# Patient Record
Sex: FEMALE | Race: WHITE | NOT HISPANIC OR LATINO | Employment: OTHER | URBAN - METROPOLITAN AREA
[De-identification: names, ages, dates, MRNs, and addresses within clinical notes are randomized per-mention and may not be internally consistent; named-entity substitution may affect disease eponyms.]

---

## 2017-03-02 ENCOUNTER — CONVERSION ENCOUNTER (OUTPATIENT)
Dept: MAMMOGRAPHY | Facility: HOSPITAL | Age: 72
End: 2017-03-02

## 2017-09-07 ENCOUNTER — CONVERSION ENCOUNTER (OUTPATIENT)
Dept: MAMMOGRAPHY | Facility: HOSPITAL | Age: 72
End: 2017-09-07

## 2018-03-22 ENCOUNTER — CONVERSION ENCOUNTER (OUTPATIENT)
Dept: MAMMOGRAPHY | Facility: HOSPITAL | Age: 73
End: 2018-03-22

## 2018-08-06 ENCOUNTER — HOSPITAL ENCOUNTER (OUTPATIENT)
Dept: PREOP | Facility: HOSPITAL | Age: 73
Setting detail: HOSPITAL OUTPATIENT SURGERY
Discharge: HOME | End: 2018-08-06
Attending: THORACIC SURGERY (CARDIOTHORACIC VASCULAR SURGERY) | Admitting: THORACIC SURGERY (CARDIOTHORACIC VASCULAR SURGERY)

## 2018-08-06 LAB — GLUCOSE BLD-MCNC: 154 MG/DL (ref 70–105)

## 2019-01-28 ENCOUNTER — HOSPITAL ENCOUNTER (OUTPATIENT)
Dept: CT IMAGING | Facility: HOSPITAL | Age: 74
Discharge: HOME | End: 2019-01-28

## 2019-02-06 ENCOUNTER — HOSPITAL ENCOUNTER (OUTPATIENT)
Dept: GASTROENTEROLOGY | Facility: HOSPITAL | Age: 74
Setting detail: HOSPITAL OUTPATIENT SURGERY
Discharge: HOME | End: 2019-02-06
Attending: THORACIC SURGERY (CARDIOTHORACIC VASCULAR SURGERY) | Admitting: THORACIC SURGERY (CARDIOTHORACIC VASCULAR SURGERY)

## 2019-02-06 LAB — GLUCOSE BLD-MCNC: 100 MG/DL (ref 70–105)

## 2019-02-20 ENCOUNTER — HOSPITAL ENCOUNTER (OUTPATIENT)
Dept: OTHER | Facility: HOSPITAL | Age: 74
Discharge: HOME | End: 2019-02-20

## 2019-02-20 LAB
ALBUMIN SERPL-MCNC: 4.2 G/DL (ref 3.5–5)
ALBUMIN/GLOB SERPL: 1.5 {RATIO} (ref 1.4–2.6)
ALP SERPL-CCNC: 74 U/L (ref 43–160)
ALT SERPL-CCNC: 15 U/L (ref 10–40)
ANION GAP SERPL CALC-SCNC: 16 MMOL/L (ref 8–19)
APTT BLD: 22.1 S (ref 22.2–34.2)
AST SERPL-CCNC: 19 U/L (ref 15–50)
BASOPHILS # BLD AUTO: 0.04 10*3/UL (ref 0–0.2)
BASOPHILS NFR BLD AUTO: 0.5 % (ref 0–3)
BILIRUB SERPL-MCNC: 0.44 MG/DL (ref 0.2–1.3)
BUN SERPL-MCNC: 17 MG/DL (ref 5–25)
BUN/CREAT SERPL: 18 {RATIO} (ref 6–20)
CALCIUM SERPL-MCNC: 9.9 MG/DL (ref 8.7–10.4)
CHLORIDE SERPL-SCNC: 101 MMOL/L (ref 99–111)
CONV ABS IMM GRAN: 0.06 10*3/UL (ref 0–0.2)
CONV CO2: 27 MMOL/L (ref 22–32)
CONV IMMATURE GRAN: 0.8 % (ref 0–1.8)
CONV TOTAL PROTEIN: 7 G/DL (ref 6.3–8.2)
CREAT UR-MCNC: 0.94 MG/DL (ref 0.5–0.9)
DEPRECATED RDW RBC AUTO: 44.5 FL (ref 36.4–46.3)
EOSINOPHIL # BLD AUTO: 0.28 10*3/UL (ref 0–0.7)
EOSINOPHIL # BLD AUTO: 3.5 % (ref 0–7)
ERYTHROCYTE [DISTWIDTH] IN BLOOD BY AUTOMATED COUNT: 12.8 % (ref 11.7–14.4)
GFR SERPLBLD BASED ON 1.73 SQ M-ARVRAT: 60 ML/MIN/{1.73_M2}
GLOBULIN UR ELPH-MCNC: 2.8 G/DL (ref 2–3.5)
GLUCOSE SERPL-MCNC: 244 MG/DL (ref 65–99)
HBA1C MFR BLD: 10.8 G/DL (ref 12–16)
HCT VFR BLD AUTO: 33.2 % (ref 37–47)
INR PPP: 0.92 (ref 2–3)
LYMPHOCYTES # BLD AUTO: 1.87 10*3/UL (ref 1–5)
MCH RBC QN AUTO: 30.7 PG (ref 27–31)
MCHC RBC AUTO-ENTMCNC: 32.5 G/DL (ref 33–37)
MCV RBC AUTO: 94.3 FL (ref 81–99)
MONOCYTES # BLD AUTO: 1.11 10*3/UL (ref 0.2–1.2)
MONOCYTES NFR BLD AUTO: 13.9 % (ref 3–10)
NEUTROPHILS # BLD AUTO: 4.63 10*3/UL (ref 2–8)
NEUTROPHILS NFR BLD AUTO: 57.9 % (ref 30–85)
NRBC CBCN: 0 % (ref 0–0.7)
OSMOLALITY SERPL CALC.SUM OF ELEC: 300 MOSM/KG (ref 273–304)
PLATELET # BLD AUTO: 327 10*3/UL (ref 130–400)
PMV BLD AUTO: 9.4 FL (ref 9.4–12.3)
POTASSIUM SERPL-SCNC: 4.1 MMOL/L (ref 3.5–5.3)
PROTHROMBIN TIME: 9.8 S (ref 9.4–12)
RBC # BLD AUTO: 3.52 10*6/UL (ref 4.2–5.4)
SODIUM SERPL-SCNC: 140 MMOL/L (ref 135–147)
VARIANT LYMPHS NFR BLD MANUAL: 23.4 % (ref 20–45)
WBC # BLD AUTO: 7.99 10*3/UL (ref 4.8–10.8)

## 2019-03-19 ENCOUNTER — HOSPITAL ENCOUNTER (OUTPATIENT)
Dept: OTHER | Facility: HOSPITAL | Age: 74
Discharge: HOME | End: 2019-03-19

## 2019-03-19 LAB
25(OH)D3 SERPL-MCNC: 18.6 NG/ML (ref 30–100)
ALBUMIN SERPL-MCNC: 2.6 G/DL (ref 3.5–5)
ALBUMIN/GLOB SERPL: 0.8 {RATIO} (ref 1.4–2.6)
ALP SERPL-CCNC: 73 U/L (ref 43–160)
ALT SERPL-CCNC: 11 U/L (ref 10–40)
ANION GAP SERPL CALC-SCNC: 19 MMOL/L (ref 8–19)
APPEARANCE UR: ABNORMAL
AST SERPL-CCNC: 14 U/L (ref 15–50)
BASOPHILS # BLD AUTO: 0.09 10*3/UL (ref 0–0.2)
BASOPHILS NFR BLD AUTO: 0.7 % (ref 0–3)
BILIRUB SERPL-MCNC: 0.25 MG/DL (ref 0.2–1.3)
BILIRUB UR QL: NEGATIVE
BUN SERPL-MCNC: 20 MG/DL (ref 5–25)
BUN/CREAT SERPL: 26 {RATIO} (ref 6–20)
CALCIUM SERPL-MCNC: 9.8 MG/DL (ref 8.7–10.4)
CHLORIDE SERPL-SCNC: 94 MMOL/L (ref 99–111)
COLOR UR: YELLOW
CONV ABS IMM GRAN: 0.25 10*3/UL (ref 0–0.2)
CONV BACTERIA: ABNORMAL
CONV CO2: 23 MMOL/L (ref 22–32)
CONV COLLECTION SOURCE (UA): ABNORMAL
CONV IMMATURE GRAN: 2.1 % (ref 0–1.8)
CONV TOTAL PROTEIN: 5.7 G/DL (ref 6.3–8.2)
CONV UROBILINOGEN IN URINE BY AUTOMATED TEST STRIP: 1 {EHRLICHU}/DL (ref 0.1–1)
CREAT UR-MCNC: 0.77 MG/DL (ref 0.5–0.9)
DEPRECATED RDW RBC AUTO: 47.7 FL (ref 36.4–46.3)
EOSINOPHIL # BLD AUTO: 0.34 10*3/UL (ref 0–0.7)
EOSINOPHIL # BLD AUTO: 2.8 % (ref 0–7)
ERYTHROCYTE [DISTWIDTH] IN BLOOD BY AUTOMATED COUNT: 14.8 % (ref 11.7–14.4)
GFR SERPLBLD BASED ON 1.73 SQ M-ARVRAT: >60 ML/MIN/{1.73_M2}
GLOBULIN UR ELPH-MCNC: 3.1 G/DL (ref 2–3.5)
GLUCOSE SERPL-MCNC: 355 MG/DL (ref 65–99)
GLUCOSE UR QL: NEGATIVE MG/DL
HBA1C MFR BLD: 7.6 G/DL (ref 12–16)
HCT VFR BLD AUTO: 23.3 % (ref 37–47)
HGB UR QL STRIP: ABNORMAL
KETONES UR QL STRIP: NEGATIVE MG/DL
LEUKOCYTE ESTERASE UR QL STRIP: ABNORMAL
LYMPHOCYTES # BLD AUTO: 1.28 10*3/UL (ref 1–5)
MCH RBC QN AUTO: 29.3 PG (ref 27–31)
MCHC RBC AUTO-ENTMCNC: 32.6 G/DL (ref 33–37)
MCV RBC AUTO: 90 FL (ref 81–99)
MONOCYTES # BLD AUTO: 1.34 10*3/UL (ref 0.2–1.2)
MONOCYTES NFR BLD AUTO: 11.1 % (ref 3–10)
NEUTROPHILS # BLD AUTO: 8.73 10*3/UL (ref 2–8)
NEUTROPHILS NFR BLD AUTO: 72.7 % (ref 30–85)
NITRITE UR QL STRIP: POSITIVE
NRBC CBCN: 0 % (ref 0–0.7)
OSMOLALITY SERPL CALC.SUM OF ELEC: 287 MOSM/KG (ref 273–304)
PH UR STRIP.AUTO: 8.5 [PH] (ref 5–8)
PLAT MORPH BLD: NORMAL
PLATELET # BLD AUTO: 750 10*3/UL (ref 130–400)
PMV BLD AUTO: 9.8 FL (ref 9.4–12.3)
POTASSIUM SERPL-SCNC: 5.6 MMOL/L (ref 3.5–5.3)
PROT UR QL: 30 MG/DL
RBC # BLD AUTO: 2.59 10*6/UL (ref 4.2–5.4)
RBC #/AREA URNS HPF: ABNORMAL /[HPF]
SMALL PLATELETS BLD QL SMEAR: NORMAL
SODIUM SERPL-SCNC: 130 MMOL/L (ref 135–147)
SP GR UR: 1.02 (ref 1–1.03)
SQUAMOUS SPT QL MICRO: ABNORMAL /[HPF]
VARIANT LYMPHS NFR BLD MANUAL: 10.6 % (ref 20–45)
WBC # BLD AUTO: 12.03 10*3/UL (ref 4.8–10.8)
WBC #/AREA URNS HPF: ABNORMAL /[HPF]

## 2019-03-21 LAB
AMOXICILLIN+CLAV SUSC ISLT: 16
AMPICILLIN SUSC ISLT: >=32
AMPICILLIN+SULBAC SUSC ISLT: 16
BACTERIA UR CULT: ABNORMAL
CEFAZOLIN SUSC ISLT: <=4
CEFEPIME SUSC ISLT: <=1
CEFTAZIDIME SUSC ISLT: <=1
CEFTRIAXONE SUSC ISLT: <=1
CEFUROXIME ORAL SUSC ISLT: 4
CEFUROXIME PARENTER SUSC ISLT: 4
CIPROFLOXACIN SUSC ISLT: <=0.25
ERTAPENEM SUSC ISLT: <=0.5
GENTAMICIN SUSC ISLT: <=1
LEVOFLOXACIN SUSC ISLT: <=0.12
NITROFURANTOIN SUSC ISLT: <=16
TETRACYCLINE SUSC ISLT: <=1
TMP SMX SUSC ISLT: <=20
TOBRAMYCIN SUSC ISLT: <=1

## 2019-03-23 ENCOUNTER — HOSPITAL ENCOUNTER (OUTPATIENT)
Dept: OTHER | Facility: HOSPITAL | Age: 74
Discharge: HOME | End: 2019-03-23

## 2019-03-23 LAB
ANION GAP SERPL CALC-SCNC: 17 MMOL/L (ref 8–19)
BUN SERPL-MCNC: 16 MG/DL (ref 5–25)
BUN/CREAT SERPL: 22 {RATIO} (ref 6–20)
CALCIUM SERPL-MCNC: 9.1 MG/DL (ref 8.7–10.4)
CHLORIDE SERPL-SCNC: 95 MMOL/L (ref 99–111)
CONV CO2: 24 MMOL/L (ref 22–32)
CREAT UR-MCNC: 0.74 MG/DL (ref 0.5–0.9)
GFR SERPLBLD BASED ON 1.73 SQ M-ARVRAT: >60 ML/MIN/{1.73_M2}
GLUCOSE SERPL-MCNC: 268 MG/DL (ref 65–99)
OSMOLALITY SERPL CALC.SUM OF ELEC: 283 MOSM/KG (ref 273–304)
POTASSIUM SERPL-SCNC: 5.3 MMOL/L (ref 3.5–5.3)
SODIUM SERPL-SCNC: 131 MMOL/L (ref 135–147)

## 2019-03-25 ENCOUNTER — HOSPITAL ENCOUNTER (OUTPATIENT)
Dept: OTHER | Facility: HOSPITAL | Age: 74
Discharge: HOME | End: 2019-03-25

## 2019-03-25 LAB
BASOPHILS # BLD AUTO: 0.11 10*3/UL (ref 0–0.2)
BASOPHILS # BLD: 1 % (ref 0–3)
BASOPHILS NFR BLD AUTO: 0.8 % (ref 0–3)
CONV ABS BANDS: 0 % (ref 1–5)
CONV ABS IMM GRAN: 0.54 10*3/UL (ref 0–0.2)
CONV ANISOCYTES: SLIGHT
CONV BASOPHILIC STIPPLING IN BLOOD BY LIGHT MICROSCOPY: ABNORMAL
CONV HYPOCHROMIA IN BLOOD BY LIGHT MICROSCOPY: SLIGHT
CONV IMMATURE GRAN: 4 % (ref 0–1.8)
CONV SEGMENTED NEUTROPHILS: 58 % (ref 45–70)
DEPRECATED RDW RBC AUTO: 49.6 FL (ref 36.4–46.3)
EOSINOPHIL # BLD AUTO: 0.42 10*3/UL (ref 0–0.7)
EOSINOPHIL # BLD AUTO: 3.1 % (ref 0–7)
EOSINOPHIL NFR BLD AUTO: 3 % (ref 0–7)
ERYTHROCYTE [DISTWIDTH] IN BLOOD BY AUTOMATED COUNT: 15.4 % (ref 11.7–14.4)
HBA1C MFR BLD: 8.2 G/DL (ref 12–16)
HCT VFR BLD AUTO: 26.4 % (ref 37–47)
LYMPHOCYTES # BLD AUTO: 3.37 10*3/UL (ref 1–5)
MCH RBC QN AUTO: 27.8 PG (ref 27–31)
MCHC RBC AUTO-ENTMCNC: 31.1 G/DL (ref 33–37)
MCV RBC AUTO: 89.5 FL (ref 81–99)
MONOCYTES # BLD AUTO: 1.52 10*3/UL (ref 0.2–1.2)
MONOCYTES NFR BLD AUTO: 11.1 % (ref 3–10)
MONOCYTES NFR BLD MANUAL: 7 % (ref 3–10)
NEUTROPHILS # BLD AUTO: 7.69 10*3/UL (ref 2–8)
NEUTROPHILS NFR BLD AUTO: 56.3 % (ref 30–85)
NRBC CBCN: 0 % (ref 0–0.7)
NUC CELL # PRT MANUAL: 0 /100{WBCS}
PLAT MORPH BLD: NORMAL
PLATELET # BLD AUTO: 936 10*3/UL (ref 130–400)
PMV BLD AUTO: 9.9 FL (ref 9.4–12.3)
POLYCHROMASIA BLD QL SMEAR: SLIGHT
RBC # BLD AUTO: 2.95 10*6/UL (ref 4.2–5.4)
SMALL PLATELETS BLD QL SMEAR: ABNORMAL
VARIANT LYMPHS NFR BLD MANUAL: 24.7 % (ref 20–45)
VARIANT LYMPHS NFR BLD MANUAL: 31 % (ref 20–45)
WBC # BLD AUTO: 13.65 10*3/UL (ref 4.8–10.8)

## 2019-03-26 ENCOUNTER — HOSPITAL ENCOUNTER (OUTPATIENT)
Dept: OTHER | Facility: HOSPITAL | Age: 74
Discharge: HOME | End: 2019-03-26

## 2019-03-26 LAB
ANION GAP SERPL CALC-SCNC: 18 MMOL/L (ref 8–19)
BASOPHILS # BLD AUTO: 0.08 10*3/UL (ref 0–0.2)
BASOPHILS NFR BLD AUTO: 0.6 % (ref 0–3)
BUN SERPL-MCNC: 19 MG/DL (ref 5–25)
BUN/CREAT SERPL: 24 {RATIO} (ref 6–20)
CALCIUM SERPL-MCNC: 8.8 MG/DL (ref 8.7–10.4)
CHLORIDE SERPL-SCNC: 95 MMOL/L (ref 99–111)
CONV ABS IMM GRAN: 0.54 10*3/UL (ref 0–0.2)
CONV CO2: 23 MMOL/L (ref 22–32)
CONV IMMATURE GRAN: 3.9 % (ref 0–1.8)
CREAT UR-MCNC: 0.78 MG/DL (ref 0.5–0.9)
DEPRECATED RDW RBC AUTO: 49.3 FL (ref 36.4–46.3)
EOSINOPHIL # BLD AUTO: 0.31 10*3/UL (ref 0–0.7)
EOSINOPHIL # BLD AUTO: 2.2 % (ref 0–7)
ERYTHROCYTE [DISTWIDTH] IN BLOOD BY AUTOMATED COUNT: 15.9 % (ref 11.7–14.4)
GFR SERPLBLD BASED ON 1.73 SQ M-ARVRAT: >60 ML/MIN/{1.73_M2}
GLUCOSE SERPL-MCNC: 164 MG/DL (ref 65–99)
HBA1C MFR BLD: 8 G/DL (ref 12–16)
HCT VFR BLD AUTO: 26.2 % (ref 37–47)
LYMPHOCYTES # BLD AUTO: 2.28 10*3/UL (ref 1–5)
MCH RBC QN AUTO: 26.3 PG (ref 27–31)
MCHC RBC AUTO-ENTMCNC: 30.5 G/DL (ref 33–37)
MCV RBC AUTO: 86.2 FL (ref 81–99)
MONOCYTES # BLD AUTO: 1.62 10*3/UL (ref 0.2–1.2)
MONOCYTES NFR BLD AUTO: 11.7 % (ref 3–10)
NEUTROPHILS # BLD AUTO: 9.02 10*3/UL (ref 2–8)
NEUTROPHILS NFR BLD AUTO: 65.1 % (ref 30–85)
NRBC CBCN: 0 % (ref 0–0.7)
OSMOLALITY SERPL CALC.SUM OF ELEC: 278 MOSM/KG (ref 273–304)
PLATELET # BLD AUTO: 880 10*3/UL (ref 130–400)
PMV BLD AUTO: 9.4 FL (ref 9.4–12.3)
POTASSIUM SERPL-SCNC: 5 MMOL/L (ref 3.5–5.3)
RBC # BLD AUTO: 3.04 10*6/UL (ref 4.2–5.4)
SODIUM SERPL-SCNC: 131 MMOL/L (ref 135–147)
VARIANT LYMPHS NFR BLD MANUAL: 16.5 % (ref 20–45)
WBC # BLD AUTO: 13.85 10*3/UL (ref 4.8–10.8)

## 2019-03-27 ENCOUNTER — HOSPITAL ENCOUNTER (OUTPATIENT)
Dept: OTHER | Facility: HOSPITAL | Age: 74
Discharge: HOME | End: 2019-03-27

## 2019-03-27 LAB
APPEARANCE UR: CLEAR
BILIRUB UR QL: NEGATIVE
COLOR UR: YELLOW
CONV BACTERIA: NEGATIVE
CONV COLLECTION SOURCE (UA): ABNORMAL
CONV CRYSTALS: ABNORMAL /[HPF]
CONV HYALINE CASTS IN URINE MICRO: ABNORMAL /[LPF]
CONV UROBILINOGEN IN URINE BY AUTOMATED TEST STRIP: 0.2 {EHRLICHU}/DL (ref 0.1–1)
GLUCOSE UR QL: NEGATIVE MG/DL
HGB UR QL STRIP: NEGATIVE
KETONES UR QL STRIP: NEGATIVE MG/DL
LEUKOCYTE ESTERASE UR QL STRIP: ABNORMAL
NITRITE UR QL STRIP: NEGATIVE
PH UR STRIP.AUTO: 6.5 [PH] (ref 5–8)
PROT UR QL: NEGATIVE MG/DL
RBC #/AREA URNS HPF: ABNORMAL /[HPF]
SP GR UR: 1.02 (ref 1–1.03)
SQUAMOUS SPT QL MICRO: ABNORMAL /[HPF]
WBC #/AREA URNS HPF: ABNORMAL /[HPF]

## 2019-03-28 ENCOUNTER — HOSPITAL ENCOUNTER (OUTPATIENT)
Dept: OTHER | Facility: HOSPITAL | Age: 74
Discharge: HOME | End: 2019-03-28

## 2019-03-28 LAB
ANION GAP SERPL CALC-SCNC: 17 MMOL/L (ref 8–19)
BASOPHILS # BLD AUTO: 0.06 10*3/UL (ref 0–0.2)
BASOPHILS NFR BLD AUTO: 0.6 % (ref 0–3)
BUN SERPL-MCNC: 20 MG/DL (ref 5–25)
BUN/CREAT SERPL: 26 {RATIO} (ref 6–20)
CALCIUM SERPL-MCNC: 9 MG/DL (ref 8.7–10.4)
CHLORIDE SERPL-SCNC: 96 MMOL/L (ref 99–111)
CONV ABS IMM GRAN: 0.21 10*3/UL (ref 0–0.2)
CONV CO2: 27 MMOL/L (ref 22–32)
CONV IMMATURE GRAN: 2.1 % (ref 0–1.8)
CREAT UR-MCNC: 0.78 MG/DL (ref 0.5–0.9)
DEPRECATED RDW RBC AUTO: 49.3 FL (ref 36.4–46.3)
EOSINOPHIL # BLD AUTO: 0.36 10*3/UL (ref 0–0.7)
EOSINOPHIL # BLD AUTO: 3.6 % (ref 0–7)
ERYTHROCYTE [DISTWIDTH] IN BLOOD BY AUTOMATED COUNT: 16 % (ref 11.7–14.4)
GFR SERPLBLD BASED ON 1.73 SQ M-ARVRAT: >60 ML/MIN/{1.73_M2}
GLUCOSE SERPL-MCNC: 152 MG/DL (ref 65–99)
HBA1C MFR BLD: 8 G/DL (ref 12–16)
HCT VFR BLD AUTO: 25.6 % (ref 37–47)
LYMPHOCYTES # BLD AUTO: 3.45 10*3/UL (ref 1–5)
MCH RBC QN AUTO: 26.8 PG (ref 27–31)
MCHC RBC AUTO-ENTMCNC: 31.3 G/DL (ref 33–37)
MCV RBC AUTO: 85.9 FL (ref 81–99)
MONOCYTES # BLD AUTO: 1.13 10*3/UL (ref 0.2–1.2)
MONOCYTES NFR BLD AUTO: 11.1 % (ref 3–10)
NEUTROPHILS # BLD AUTO: 4.93 10*3/UL (ref 2–8)
NEUTROPHILS NFR BLD AUTO: 48.6 % (ref 30–85)
NRBC CBCN: 0 % (ref 0–0.7)
OSMOLALITY SERPL CALC.SUM OF ELEC: 286 MOSM/KG (ref 273–304)
PLATELET # BLD AUTO: 702 10*3/UL (ref 130–400)
PMV BLD AUTO: 9.4 FL (ref 9.4–12.3)
POTASSIUM SERPL-SCNC: 4.9 MMOL/L (ref 3.5–5.3)
RBC # BLD AUTO: 2.98 10*6/UL (ref 4.2–5.4)
SODIUM SERPL-SCNC: 135 MMOL/L (ref 135–147)
VARIANT LYMPHS NFR BLD MANUAL: 34 % (ref 20–45)
WBC # BLD AUTO: 10.14 10*3/UL (ref 4.8–10.8)

## 2019-03-30 LAB — BACTERIA UR CULT: NORMAL

## 2019-06-10 ENCOUNTER — HOSPITAL ENCOUNTER (OUTPATIENT)
Dept: CT IMAGING | Facility: HOSPITAL | Age: 74
Discharge: HOME | End: 2019-06-10
Attending: INTERNAL MEDICINE

## 2019-06-10 LAB
CREAT BLD-MCNC: 0.7 MG/DL (ref 0.6–1.4)
GFR SERPLBLD BASED ON 1.73 SQ M-ARVRAT: >60 ML/MIN/{1.73_M2}

## 2019-08-20 ENCOUNTER — HOSPITAL ENCOUNTER (OUTPATIENT)
Dept: MAMMOGRAPHY | Facility: HOSPITAL | Age: 74
Discharge: HOME | End: 2019-08-20
Attending: INTERNAL MEDICINE

## 2019-08-22 ENCOUNTER — HOSPITAL ENCOUNTER (OUTPATIENT)
Dept: MAMMOGRAPHY | Facility: HOSPITAL | Age: 74
Discharge: HOME | End: 2019-08-22
Attending: INTERNAL MEDICINE

## 2019-09-09 ENCOUNTER — HOSPITAL ENCOUNTER (OUTPATIENT)
Dept: CT IMAGING | Facility: HOSPITAL | Age: 74
Discharge: HOME | End: 2019-09-09
Attending: INTERNAL MEDICINE

## 2019-09-09 LAB
CREAT BLD-MCNC: 0.6 MG/DL (ref 0.6–1.4)
GFR SERPLBLD BASED ON 1.73 SQ M-ARVRAT: >60 ML/MIN/{1.73_M2}

## 2019-09-12 ENCOUNTER — OFFICE VISIT (OUTPATIENT)
Dept: SURGERY | Facility: CLINIC | Age: 74
End: 2019-09-12

## 2019-09-12 ENCOUNTER — PREP FOR SURGERY (OUTPATIENT)
Dept: OTHER | Facility: HOSPITAL | Age: 74
End: 2019-09-12

## 2019-09-12 VITALS
DIASTOLIC BLOOD PRESSURE: 88 MMHG | BODY MASS INDEX: 22.08 KG/M2 | OXYGEN SATURATION: 100 % | HEART RATE: 90 BPM | SYSTOLIC BLOOD PRESSURE: 174 MMHG | WEIGHT: 141 LBS | TEMPERATURE: 97.7 F

## 2019-09-12 DIAGNOSIS — K21.00 GASTRO-ESOPHAGEAL REFLUX DISEASE WITH ESOPHAGITIS: ICD-10-CM

## 2019-09-12 DIAGNOSIS — I10 ESSENTIAL HYPERTENSION: Primary | ICD-10-CM

## 2019-09-12 DIAGNOSIS — Z85.3 HISTORY OF BREAST CANCER: ICD-10-CM

## 2019-09-12 DIAGNOSIS — C49.A1 GASTROINTESTINAL STROMAL TUMOR (GIST) OF ESOPHAGUS (HCC): Primary | ICD-10-CM

## 2019-09-12 DIAGNOSIS — C49.A1 GASTROINTESTINAL STROMAL TUMOR (GIST) OF ESOPHAGUS (HCC): ICD-10-CM

## 2019-09-12 PROBLEM — J98.59 MEDIASTINAL MASS: Status: ACTIVE | Noted: 2018-08-02

## 2019-09-12 PROBLEM — E11.9 DIABETES MELLITUS, TYPE II: Status: ACTIVE | Noted: 2018-08-01

## 2019-09-12 PROBLEM — I86.8 VARICOSE VEINS OF OTHER SPECIFIED SITES: Status: ACTIVE | Noted: 2018-08-02

## 2019-09-12 PROBLEM — M81.0 OSTEOPOROSIS: Status: ACTIVE | Noted: 2018-08-02

## 2019-09-12 PROBLEM — H26.9 CATARACT: Status: ACTIVE | Noted: 2018-08-02

## 2019-09-12 PROBLEM — E03.9 HYPOTHYROIDISM, UNSPECIFIED: Status: ACTIVE | Noted: 2018-08-01

## 2019-09-12 PROBLEM — E04.9 GOITER: Status: ACTIVE | Noted: 2018-08-01

## 2019-09-12 PROBLEM — K58.9 IBS (IRRITABLE BOWEL SYNDROME): Status: ACTIVE | Noted: 2018-08-01

## 2019-09-12 PROBLEM — E78.5 HYPERLIPIDEMIA: Status: ACTIVE | Noted: 2018-08-01

## 2019-09-12 PROCEDURE — 99215 OFFICE O/P EST HI 40 MIN: CPT | Performed by: THORACIC SURGERY (CARDIOTHORACIC VASCULAR SURGERY)

## 2019-09-12 RX ORDER — APIXABAN 5 MG/1
5 TABLET, FILM COATED ORAL DAILY
COMMUNITY
Start: 2019-07-05 | End: 2020-07-16

## 2019-09-12 RX ORDER — IMATINIB MESYLATE 400 MG/1
400 TABLET, FILM COATED ORAL NIGHTLY
Refills: 3 | COMMUNITY
Start: 2019-08-13 | End: 2021-04-22

## 2019-09-12 RX ORDER — ONDANSETRON HYDROCHLORIDE 8 MG/1
8 TABLET, FILM COATED ORAL EVERY 8 HOURS PRN
COMMUNITY
Start: 2018-09-11 | End: 2021-10-05

## 2019-09-12 RX ORDER — PREDNISONE 50 MG/1
TABLET ORAL
COMMUNITY
Start: 2019-09-07 | End: 2019-09-13

## 2019-09-12 RX ORDER — OMEPRAZOLE 20 MG/1
20 CAPSULE, DELAYED RELEASE ORAL DAILY
Refills: 3 | COMMUNITY
Start: 2019-07-01 | End: 2021-09-01

## 2019-09-12 RX ORDER — POTASSIUM CHLORIDE 20 MEQ/1
20 TABLET, EXTENDED RELEASE ORAL DAILY
COMMUNITY
Start: 2019-06-23 | End: 2021-08-02 | Stop reason: HOSPADM

## 2019-09-12 RX ORDER — METOPROLOL SUCCINATE 100 MG/1
100 TABLET, EXTENDED RELEASE ORAL DAILY
Refills: 3 | COMMUNITY
Start: 2019-08-05 | End: 2021-08-02 | Stop reason: HOSPADM

## 2019-09-12 RX ORDER — CALCIUM CARBONATE/VITAMIN D3 500-10/5ML
LIQUID (ML) ORAL
COMMUNITY
Start: 2018-12-18 | End: 2021-04-22

## 2019-09-12 RX ORDER — LEVOTHYROXINE SODIUM 0.05 MG/1
50 TABLET ORAL DAILY
COMMUNITY
Start: 2018-08-01 | End: 2019-12-10 | Stop reason: ALTCHOICE

## 2019-09-12 NOTE — H&P (VIEW-ONLY)
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  Patient has gastric pull-through.  There is esophageal dilation above the gastric pull-through as though there is significant esophageal stenosis I personally examined the CT scan from September 6.  Also reviewed the radiology report.  The gastric conduit appears unremarkable.  There is some question as to tumor recurrence   on the other hand the patient is only having minimal symptoms of dysphagia.  She has had no aspiration episodes.  Her weight is relatively stable at 141 pounds.  Patient's energy level is improving.  Her appetite is improving.  No fevers chills or night sweats.  All systems have been reviewed and scanned into the chart.  Positive for weight loss poor appetite bowel changes diarrhea associated with her medication occasional nausea occasional hoarseness and persistent cough.  All other systems are negative.  Medical history notable for diabetes osteoporosis in her esophageal tumor.    On physical examination she is well-appearing and in no distress.  Sclera anicteric neck is supple trachea midline no supraclavicular cervical lymphadenopathy chest expansion symmetrical and clear cardiac exam regular rate and rhythm abdomen nondistended nontender extremities Free of cyanosis clubbing or edema.  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  I have explained the procedure risks and benefits to the patient she is in agreement with proceeding.  Hold Eliquis for 2 days before proceeding.

## 2019-09-12 NOTE — PROGRESS NOTES
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  Patient has gastric pull-through.  There is esophageal dilation above the gastric pull-through as though there is significant esophageal stenosis I personally examined the CT scan from September 6.  Also reviewed the radiology report.  The gastric conduit appears unremarkable.  There is some question as to tumor recurrence   on the other hand the patient is only having minimal symptoms of dysphagia.  She has had no aspiration episodes.  Her weight is relatively stable at 141 pounds.  Patient's energy level is improving.  Her appetite is improving.  No fevers chills or night sweats.  All systems have been reviewed and scanned into the chart.  Positive for weight loss poor appetite bowel changes diarrhea associated with her medication occasional nausea occasional hoarseness and persistent cough.  All other systems are negative.  Medical history notable for diabetes osteoporosis in her esophageal tumor.    On physical examination she is well-appearing and in no distress.  Sclera anicteric neck is supple trachea midline no supraclavicular cervical lymphadenopathy chest expansion symmetrical and clear cardiac exam regular rate and rhythm abdomen nondistended nontender extremities Free of cyanosis clubbing or edema.  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  I have explained the procedure risks and benefits to the patient she is in agreement with proceeding.  Hold Eliquis for 2 days before proceeding.

## 2019-09-16 ENCOUNTER — HOSPITAL ENCOUNTER (OUTPATIENT)
Facility: HOSPITAL | Age: 74
Setting detail: HOSPITAL OUTPATIENT SURGERY
Discharge: HOME | End: 2019-09-16
Attending: THORACIC SURGERY (CARDIOTHORACIC VASCULAR SURGERY) | Admitting: THORACIC SURGERY (CARDIOTHORACIC VASCULAR SURGERY)

## 2019-09-16 ENCOUNTER — ANESTHESIA (OUTPATIENT)
Dept: GASTROENTEROLOGY | Facility: HOSPITAL | Age: 74
End: 2019-09-16

## 2019-09-16 ENCOUNTER — ANESTHESIA EVENT (OUTPATIENT)
Dept: GASTROENTEROLOGY | Facility: HOSPITAL | Age: 74
End: 2019-09-16

## 2019-09-16 VITALS
HEIGHT: 68 IN | DIASTOLIC BLOOD PRESSURE: 65 MMHG | OXYGEN SATURATION: 99 % | BODY MASS INDEX: 20.95 KG/M2 | SYSTOLIC BLOOD PRESSURE: 163 MMHG | WEIGHT: 138.23 LBS | TEMPERATURE: 97.9 F | HEART RATE: 75 BPM | RESPIRATION RATE: 18 BRPM

## 2019-09-16 LAB — GLUCOSE BLDC GLUCOMTR-MCNC: 89 MG/DL (ref 70–105)

## 2019-09-16 PROCEDURE — 82962 GLUCOSE BLOOD TEST: CPT

## 2019-09-16 PROCEDURE — 25010000002 PROPOFOL 10 MG/ML EMULSION: Performed by: ANESTHESIOLOGY

## 2019-09-16 PROCEDURE — C1726 CATH, BAL DIL, NON-VASCULAR: HCPCS | Performed by: THORACIC SURGERY (CARDIOTHORACIC VASCULAR SURGERY)

## 2019-09-16 PROCEDURE — 43249 ESOPH EGD DILATION <30 MM: CPT | Performed by: THORACIC SURGERY (CARDIOTHORACIC VASCULAR SURGERY)

## 2019-09-16 RX ORDER — LIDOCAINE HYDROCHLORIDE 10 MG/ML
INJECTION, SOLUTION EPIDURAL; INFILTRATION; INTRACAUDAL; PERINEURAL AS NEEDED
Status: DISCONTINUED | OUTPATIENT
Start: 2019-09-16 | End: 2019-09-16 | Stop reason: SURG

## 2019-09-16 RX ORDER — SODIUM CHLORIDE 0.9 % (FLUSH) 0.9 %
3 SYRINGE (ML) INJECTION EVERY 12 HOURS SCHEDULED
Status: DISCONTINUED | OUTPATIENT
Start: 2019-09-16 | End: 2019-09-16 | Stop reason: HOSPADM

## 2019-09-16 RX ORDER — SODIUM CHLORIDE 0.9 % (FLUSH) 0.9 %
3-10 SYRINGE (ML) INJECTION AS NEEDED
Status: DISCONTINUED | OUTPATIENT
Start: 2019-09-16 | End: 2019-09-16 | Stop reason: HOSPADM

## 2019-09-16 RX ORDER — PROPOFOL 10 MG/ML
VIAL (ML) INTRAVENOUS AS NEEDED
Status: DISCONTINUED | OUTPATIENT
Start: 2019-09-16 | End: 2019-09-16 | Stop reason: SURG

## 2019-09-16 RX ORDER — SODIUM CHLORIDE 9 MG/ML
9 INJECTION, SOLUTION INTRAVENOUS CONTINUOUS PRN
Status: DISCONTINUED | OUTPATIENT
Start: 2019-09-16 | End: 2019-09-16 | Stop reason: HOSPADM

## 2019-09-16 RX ADMIN — PROPOFOL 180 MG: 10 INJECTION, EMULSION INTRAVENOUS at 12:02

## 2019-09-16 RX ADMIN — LIDOCAINE HYDROCHLORIDE 50 MG: 10 INJECTION, SOLUTION EPIDURAL; INFILTRATION; INTRACAUDAL; PERINEURAL at 12:02

## 2019-09-16 RX ADMIN — SODIUM CHLORIDE 9 ML/HR: 0.9 INJECTION, SOLUTION INTRAVENOUS at 10:44

## 2019-09-16 NOTE — DISCHARGE INSTRUCTIONS
A responsible adult should stay with you and you should rest quietly for the rest of the day.    Do not drink alcohol, drive, operate any heavy machinery or power tools or make any legal/important decisions for the next 24 hours.     Progress your diet as tolerated.  If you begin to experience severe pain, increased shortness of breath, racing heartbeat or a fever above 101 F, seek immediate medical attention.     Follow up with MD as instructed. Call office for results in 3 to 5 days if needed.

## 2019-09-16 NOTE — ANESTHESIA PREPROCEDURE EVALUATION
Anesthesia Evaluation     Patient summary reviewed and Nursing notes reviewed   history of anesthetic complications: prolonged sedation  NPO Solid Status: > 8 hours  NPO Liquid Status: > 8 hours           Airway   Mallampati: II  TM distance: >3 FB  Neck ROM: full  No difficulty expected  Dental - normal exam     Pulmonary    (+) a smoker Former,   Cardiovascular     PT is on anticoagulation therapy    (+) hypertension, dysrhythmias Atrial Fib, hyperlipidemia,       Neuro/Psych  (+) psychiatric history Depression,     GI/Hepatic/Renal/Endo    (+)  GERD,  diabetes mellitus, hypothyroidism,     Musculoskeletal     Abdominal    Substance History      OB/GYN          Other      history of cancer    ROS/Med Hx Other: Dysphagia, EF 65%, esophagitis, osteoporosis, esophageal cancer, h/o breast cancer                Anesthesia Plan    ASA 3     MAC   (Patient identified; pre-operative vital signs, all relevant labs/studies, complete medical/surgical/anesthetic history, full medication list, full allergy list, and NPO status obtained/reviewed; physical assessment performed; anesthetic options, side effects, potential complications, risks, and benefits discussed; questions answered; written anesthesia consent obtained; patient cleared for procedure; anesthesia machine and equipment checked and functioning)  Anesthetic plan, all risks, benefits, and alternatives have been provided, discussed and informed consent has been obtained with: patient.

## 2019-09-16 NOTE — OP NOTE
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  The Olympus endoscope was introduced per office.  It was easily passed across the cricopharyngeus.  The proximal esophagus was dilated.  The anastomosis was visualized.  It was eccentrically located in the esophagus.  There is no evidence of tumor recurrence.  It did not appear that there was extra mucosal compression.  The anastomosis was round or circular in configuration.  Scope passed easily into the stomach.  The gastric mucosa bled easily on suctioning.  Examination was carried out down to the duodenum which was unremarkable.  Was no gastric outlet obstruction.  Free return of bilious material returned.  Anastomosis was carefully inspected.  I did not find any evidence of recurrence.  The anastomosis was balloon dilated up to 15 mm.  On reinspection there was no bleeding or tear at the anastomosis.  The scope was removed after suctioning out fluid and air from the stomach and esophagus.  The patient tolerated the procedure well.      Aldair Booker MD     Date: 9/16/2019  Time: 12:38 PM

## 2019-09-16 NOTE — ANESTHESIA POSTPROCEDURE EVALUATION
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  Pain management: adequate  Airway patency: patent  Anesthetic complications: No anesthetic complications  PONV Status: none  Cardiovascular status: acceptable  Respiratory status: acceptable  Hydration status: acceptable    Comments: Patient seen and examined postoperatively; vital signs stable; SpO2 greater than or equal to 90%; cardiopulmonary status stable; nausea/vomiting adequately controlled; pain adequately controlled; no apparent anesthesia complications; patient discharged from anesthesia care when discharge criteria were met

## 2019-11-27 ENCOUNTER — HOSPITAL ENCOUNTER (OUTPATIENT)
Dept: CT IMAGING | Facility: HOSPITAL | Age: 74
Discharge: HOME | End: 2019-11-27
Attending: INTERNAL MEDICINE

## 2019-12-04 ENCOUNTER — HOSPITAL ENCOUNTER (OUTPATIENT)
Dept: GENERAL RADIOLOGY | Facility: HOSPITAL | Age: 74
Discharge: HOME | End: 2019-12-04
Attending: INTERNAL MEDICINE

## 2019-12-10 ENCOUNTER — OFFICE VISIT (OUTPATIENT)
Dept: SURGERY | Facility: CLINIC | Age: 74
End: 2019-12-10

## 2019-12-10 ENCOUNTER — PREP FOR SURGERY (OUTPATIENT)
Dept: OTHER | Facility: HOSPITAL | Age: 74
End: 2019-12-10

## 2019-12-10 VITALS
SYSTOLIC BLOOD PRESSURE: 148 MMHG | OXYGEN SATURATION: 98 % | BODY MASS INDEX: 20.37 KG/M2 | DIASTOLIC BLOOD PRESSURE: 83 MMHG | WEIGHT: 134 LBS | TEMPERATURE: 97.6 F | HEART RATE: 111 BPM

## 2019-12-10 DIAGNOSIS — C49.A1 GASTROINTESTINAL STROMAL TUMOR (GIST) OF ESOPHAGUS (HCC): Primary | ICD-10-CM

## 2019-12-10 DIAGNOSIS — R13.19 ESOPHAGEAL DYSPHAGIA: ICD-10-CM

## 2019-12-10 DIAGNOSIS — Z98.890 HISTORY OF ESOPHAGECTOMY: ICD-10-CM

## 2019-12-10 DIAGNOSIS — Z90.49 HISTORY OF ESOPHAGECTOMY: ICD-10-CM

## 2019-12-10 PROBLEM — R13.10 DYSPHAGIA: Status: ACTIVE | Noted: 2019-12-10

## 2019-12-10 PROCEDURE — 99215 OFFICE O/P EST HI 40 MIN: CPT | Performed by: THORACIC SURGERY (CARDIOTHORACIC VASCULAR SURGERY)

## 2019-12-10 RX ORDER — LEVOTHYROXINE SODIUM 0.03 MG/1
25 TABLET ORAL DAILY
COMMUNITY
Start: 2019-11-20

## 2019-12-10 NOTE — H&P (VIEW-ONLY)
Thoracic Surgery Progress Note      Chief Complaint:   Follow-up gist tumor of esophagus esophagectomy    Interval History:  The patient continues to have dysphagia following esophagectomy.  In September she underwent esophagogastroscopy and dilation of the anastomosis.  She underwent a CT scan that I personally examined.  There is some report of thickening around the area of the anastomosis.  I personally examined the CT scan and reviewed the radiology report.  I do not see any sign of recurrent tumor at the anastomosis.  On the other hand there is a new left pleural effusion.  The radiologist was concerned about some thickening at the anastomosis.  The patient has a host of symptoms including intermittent lethargy.  This mostly related to the timing of her Gleevec.  She mostly has solid food dysphagia.  No hematemesis.  No hemoptysis.  No melena.  I have personally discussed the case with Dr. Ro.  I plan to perform an EGD.  I plan to dilate..  I have reviewed the interim physician medical record   I have discussed the case with the following physicians Dr. Ro.  See above  I have reviewed the interim radiographic images and the radiology reports, my independent findings are as follows see above  The patient's medications are reviewed she is on Eliquis and this will be held preoperatively.  Family history is noncontributory to the current problem.  Gist tumors hers is not hereditary.  Past medical history notable for breast cancer    Review of Systems:  All systems reviewed with the patient.  Results scanned into the chart.  Multiple positives including poor appetite diarrhea loss of weight difficulty swallowing when high blood pressure.  All other systems are negative.    Physical Exam:  Physical examination does not show any obvious evidence of recurrent disease sclera anicteric neck is supple trachea is midline no cervical supraclavicular or axillary adenopathy chest expansion is symmetrical and lung  sounds are clear.  Slight dullness to percussion of the left base.  Abdomen is nondistended nontender.  Abdominal and thoracic wounds have healed well.  No abdominal masses noted.  Extremities are free of cyanosis or clubbing.  There is trace edema in both lower extremities.  Neurological examination is nonfocal.    Results Review:  There are no laboratory for my review.    Impression/Plan:  #1 history of gist tumor status post Iver Sunil esophagogastrectomy questionable evidence of recurrence will order PET scan  2.  Anastomotic narrowing evident on CT scan with dysphagia.  Status post a single esophageal dilation.  Plan repeat esophageal dilation and EGD.  3.  Symptoms secondary to Gleevec.  Consider altering Gleevec following PET scan.  4.  Long-term anticoagulation will hold preoperatively  Procedures    Aldair Booker MD  12/10/2019  10:20 AM

## 2019-12-13 ENCOUNTER — HOSPITAL ENCOUNTER (OUTPATIENT)
Dept: PET IMAGING | Facility: HOSPITAL | Age: 74
Discharge: HOME | End: 2019-12-13

## 2019-12-13 ENCOUNTER — ANESTHESIA EVENT (OUTPATIENT)
Dept: GASTROENTEROLOGY | Facility: HOSPITAL | Age: 74
End: 2019-12-13

## 2019-12-16 ENCOUNTER — APPOINTMENT (OUTPATIENT)
Dept: CT IMAGING | Facility: HOSPITAL | Age: 74
End: 2019-12-16

## 2019-12-16 ENCOUNTER — HOSPITAL ENCOUNTER (OUTPATIENT)
Facility: HOSPITAL | Age: 74
Setting detail: HOSPITAL OUTPATIENT SURGERY
Discharge: HOME | End: 2019-12-16
Attending: THORACIC SURGERY (CARDIOTHORACIC VASCULAR SURGERY) | Admitting: THORACIC SURGERY (CARDIOTHORACIC VASCULAR SURGERY)

## 2019-12-16 ENCOUNTER — ANESTHESIA (OUTPATIENT)
Dept: GASTROENTEROLOGY | Facility: HOSPITAL | Age: 74
End: 2019-12-16

## 2019-12-16 VITALS
TEMPERATURE: 98.5 F | BODY MASS INDEX: 21.28 KG/M2 | RESPIRATION RATE: 18 BRPM | HEIGHT: 68 IN | WEIGHT: 140.43 LBS | DIASTOLIC BLOOD PRESSURE: 51 MMHG | SYSTOLIC BLOOD PRESSURE: 138 MMHG | HEART RATE: 96 BPM | OXYGEN SATURATION: 97 %

## 2019-12-16 LAB — GLUCOSE BLDC GLUCOMTR-MCNC: 92 MG/DL (ref 70–105)

## 2019-12-16 PROCEDURE — 25010000002 PROPOFOL 10 MG/ML EMULSION: Performed by: ANESTHESIOLOGY

## 2019-12-16 PROCEDURE — 71250 CT THORAX DX C-: CPT

## 2019-12-16 PROCEDURE — 82962 GLUCOSE BLOOD TEST: CPT

## 2019-12-16 PROCEDURE — 43249 ESOPH EGD DILATION <30 MM: CPT | Performed by: THORACIC SURGERY (CARDIOTHORACIC VASCULAR SURGERY)

## 2019-12-16 PROCEDURE — C1726 CATH, BAL DIL, NON-VASCULAR: HCPCS | Performed by: THORACIC SURGERY (CARDIOTHORACIC VASCULAR SURGERY)

## 2019-12-16 RX ORDER — SODIUM CHLORIDE 0.9 % (FLUSH) 0.9 %
10 SYRINGE (ML) INJECTION AS NEEDED
Status: DISCONTINUED | OUTPATIENT
Start: 2019-12-16 | End: 2019-12-16 | Stop reason: HOSPADM

## 2019-12-16 RX ORDER — SODIUM CHLORIDE 0.9 % (FLUSH) 0.9 %
10 SYRINGE (ML) INJECTION EVERY 12 HOURS SCHEDULED
Status: DISCONTINUED | OUTPATIENT
Start: 2019-12-16 | End: 2019-12-16 | Stop reason: HOSPADM

## 2019-12-16 RX ORDER — LIDOCAINE HYDROCHLORIDE 10 MG/ML
INJECTION, SOLUTION EPIDURAL; INFILTRATION; INTRACAUDAL; PERINEURAL AS NEEDED
Status: DISCONTINUED | OUTPATIENT
Start: 2019-12-16 | End: 2019-12-16 | Stop reason: SURG

## 2019-12-16 RX ORDER — CETIRIZINE HYDROCHLORIDE 10 MG/1
10 TABLET ORAL DAILY
Status: ON HOLD | COMMUNITY
End: 2021-07-22

## 2019-12-16 RX ORDER — SODIUM CHLORIDE 9 MG/ML
9 INJECTION, SOLUTION INTRAVENOUS CONTINUOUS PRN
Status: DISCONTINUED | OUTPATIENT
Start: 2019-12-16 | End: 2019-12-16 | Stop reason: HOSPADM

## 2019-12-16 RX ORDER — PROPOFOL 10 MG/ML
VIAL (ML) INTRAVENOUS AS NEEDED
Status: DISCONTINUED | OUTPATIENT
Start: 2019-12-16 | End: 2019-12-16 | Stop reason: SURG

## 2019-12-16 RX ORDER — GLYCOPYRROLATE 0.2 MG/ML
INJECTION INTRAMUSCULAR; INTRAVENOUS AS NEEDED
Status: DISCONTINUED | OUTPATIENT
Start: 2019-12-16 | End: 2019-12-16 | Stop reason: SURG

## 2019-12-16 RX ADMIN — PROPOFOL 190 MG: 10 INJECTION, EMULSION INTRAVENOUS at 14:02

## 2019-12-16 RX ADMIN — LIDOCAINE HYDROCHLORIDE 50 MG: 10 INJECTION, SOLUTION EPIDURAL; INFILTRATION; INTRACAUDAL; PERINEURAL at 14:02

## 2019-12-16 RX ADMIN — GLYCOPYRROLATE 0.2 MG: 0.2 INJECTION, SOLUTION INTRAMUSCULAR; INTRAVENOUS at 14:02

## 2019-12-16 RX ADMIN — SODIUM CHLORIDE 9 ML/HR: 0.9 INJECTION, SOLUTION INTRAVENOUS at 10:08

## 2019-12-16 NOTE — ANESTHESIA PREPROCEDURE EVALUATION
Anesthesia Evaluation     Patient summary reviewed and Nursing notes reviewed   history of anesthetic complications: prolonged sedation  NPO Solid Status: > 8 hours  NPO Liquid Status: > 8 hours           Airway   Dental      Pulmonary    (+) a smoker Former,   Cardiovascular     ECG reviewed  PT is on anticoagulation therapy  Patient on routine beta blocker    (+) hypertension, dysrhythmias Atrial Fib, hyperlipidemia,       Neuro/Psych  GI/Hepatic/Renal/Endo    (+)  GERD,  diabetes mellitus, thyroid problem hypothyroidism    Musculoskeletal     Abdominal    Substance History      OB/GYN          Other   blood dyscrasia anemia,   history of cancer    ROS/Med Hx Other: Allergies, esophagitis, goiter, esophageal cancer, breast cancer, IBS, osteoporosis, dysphagia    Echo  Technically satisfactory study. Mitral valve is thickened with adequate  opening motion. Aortic valve is thickened with adequate opening motion.  Tricuspid valve is normal. Left atrium is enlarged. Left ventricle is normal  in size and contractility with ejection fraction of 65%. No pericardial  effusion or intracardiac thrombus is seen. No evidence for mitral tricuspid or  aortic regurgitation is seen by Doppler study.                Anesthesia Plan    ASA 3     MAC   (Patient identified; pre-operative vital signs, all relevant labs/studies, complete medical/surgical/anesthetic history, full medication list, full allergy list, and NPO status obtained/reviewed; physical assessment performed; anesthetic options, side effects, potential complications, risks, and benefits discussed; questions answered; written anesthesia consent obtained; patient cleared for procedure; anesthesia machine and equipment checked and functioning)    Anesthetic plan, all risks, benefits, and alternatives have been provided, discussed and informed consent has been obtained with: patient.

## 2019-12-16 NOTE — ANESTHESIA POSTPROCEDURE EVALUATION
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          Post Anesthesia Care and Evaluation    Patient location during evaluation: PACU  Patient participation: complete - patient participated  Level of consciousness: awake  Pain scale: See nurse's notes for pain score.  Pain management: adequate  Airway patency: patent  Anesthetic complications: No anesthetic complications  PONV Status: none  Cardiovascular status: acceptable  Respiratory status: acceptable  Hydration status: acceptable    Comments: Patient seen and examined postoperatively; vital signs stable; SpO2 greater than or equal to 90%; cardiopulmonary status stable; nausea/vomiting adequately controlled; pain adequately controlled; no apparent anesthesia complications; patient discharged from anesthesia care when discharge criteria were met

## 2019-12-16 NOTE — OP NOTE
ESOPHAGOGASTRODUODENOSCOPY  Procedure Report    Patient Name:  Camilla Marcos  YOB: 1945    Date of Surgery:  12/16/2019      Indications: Dysphagia.    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    Procedure/CPT® Codes:      Procedure(s):  ESOPHAGOGASTRODUODENOSCOPY with balloon dilitation 15-18mm) up to 16mm    Staff:  Surgeon(s):  Aldair Booker MD         Anesthesia: Monitored Anesthesia Care    Estimated Blood Loss: minimal    Implants:    Nothing was implanted during the procedure    Specimen:          None        Findings: Mild anastomotic stricture balloon dilated to 16 mm.  Short esophageal fistula without apparent communication.    Complications: None    Description of Procedure: Monitored anesthesia care was used.  The Olympus endoscope was introduced per office.  Passed across the cricopharyngeus easily.  Anastomosis was approximately 25 cm.  The scope was advanced into the stomach.  There was no retained food within the stomach.  Secretions were easily aspirated out.  The stomach mucosa bled easily when touched with the endoscope.  There is no obvious evidence of recurrent cancer.  On retroflexion I did not note any abnormality at the anastomosis.  On distal visualization there was a small fistula coming off of the stomach just distal to the anastomosis.  The fistula was short and blind.  It was 1 to 2 cm distal to the squamocolumnar junction.  I carefully advanced the endoscope into the stomach and deployed a balloon and then pulled the scope and balloon back.  I then balloon dilated the anastomosis to 16 mm.  This was well-tolerated without bleeding or cracking of the mucosa.  The scope was readvanced across the anastomosis.  There seemed to be little effective dilating with 16 mm.  The  endoscope passed easily both before and after balloon.    dilation.  The scope was removed the patient tolerated the procedure well and was returned to the recovery room in stable condition.  Plans are to get a CT scan with oral contrast to further delineate the position and orientation of the fistula and to related to the previously performed PET CT scan.      Aldair Booker MD     Date: 12/16/2019  Time: 2:31 PM

## 2019-12-19 ENCOUNTER — HOSPITAL ENCOUNTER (OUTPATIENT)
Dept: OTHER | Facility: HOSPITAL | Age: 74
Discharge: HOME | End: 2019-12-19

## 2019-12-19 DIAGNOSIS — Z00.6 EXAMINATION FOR NORMAL COMPARISON OR CONTROL IN CLINICAL RESEARCH: ICD-10-CM

## 2019-12-31 ENCOUNTER — OFFICE VISIT (OUTPATIENT)
Dept: SURGERY | Facility: CLINIC | Age: 74
End: 2019-12-31

## 2019-12-31 VITALS
WEIGHT: 137 LBS | OXYGEN SATURATION: 98 % | SYSTOLIC BLOOD PRESSURE: 161 MMHG | HEART RATE: 90 BPM | DIASTOLIC BLOOD PRESSURE: 86 MMHG | BODY MASS INDEX: 20.83 KG/M2 | TEMPERATURE: 97.1 F

## 2019-12-31 DIAGNOSIS — C49.A1 GASTROINTESTINAL STROMAL TUMOR (GIST) OF ESOPHAGUS (HCC): ICD-10-CM

## 2019-12-31 DIAGNOSIS — R13.19 ESOPHAGEAL DYSPHAGIA: Primary | ICD-10-CM

## 2019-12-31 PROCEDURE — 99213 OFFICE O/P EST LOW 20 MIN: CPT | Performed by: THORACIC SURGERY (CARDIOTHORACIC VASCULAR SURGERY)

## 2019-12-31 RX ORDER — DIPHENHYDRAMINE HCL 25 MG
25 CAPSULE ORAL EVERY 6 HOURS PRN
COMMUNITY
End: 2021-08-02 | Stop reason: HOSPADM

## 2019-12-31 NOTE — PROGRESS NOTES
Thoracic surgery progress note    Chief complaint patient is seen in follow-up of EGD and dilation of esophagogastric anastomosis  Patient has a history of gastrointestinal stromal tumor treated by Iver Sunil esophagogastrectomy.  She had been having dysphagia.  She underwent dilation of the anastomosis and her dysphasia is improved somewhat.  The primary problem however is that the patient has gastrointestinal symptoms of bloating and discomfort.  These may be due to Gleevec.  The patient is completed a PET CT scan which I personally examined.  It shows PET activity in the gastric conduit near the anastomosis but no residual esophageal activity.  I think the findings are more consistent with postoperative inflammation than they are with recurrent tumor because the tumor was not adjacent to the stomach.  It was an esophageal tumor.    The patient did not have complications related to the EGD and dilation.  There was no mediastinal discomfort no chest discomfort no emesis no bleeding    On physical examination she is well-appearing in no distress sclera anicteric conjunctiva pink neck supple y trachea midline.  Moves all extremities well.  Normal speech    As above I personally examined the PET CT scan and reviewed the radiology report I concur with the report.  Independent findings are given above.    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  Gleevec therapy with possible gastrointestinal side effects secondary to Gleevec therapy patient is planning on continuing for 1 year postoperatively.  3.  High-level anxiety about tumor recurrence.  I discussed the findings with the patient of the PET CT scan which I think do not show tumor recurrence.    A copy of this report will be sent to the patient's medical oncologist.  Plan to follow-up with the patient in 3 months no testing.

## 2020-03-05 ENCOUNTER — HOSPITAL ENCOUNTER (OUTPATIENT)
Dept: CT IMAGING | Facility: HOSPITAL | Age: 75
Discharge: HOME | End: 2020-03-05
Attending: INTERNAL MEDICINE

## 2020-03-05 LAB
CREAT BLD-MCNC: 0.8 MG/DL (ref 0.6–1.4)
GFR SERPLBLD BASED ON 1.73 SQ M-ARVRAT: >60 ML/MIN/{1.73_M2}

## 2020-05-07 ENCOUNTER — HOSPITAL ENCOUNTER (OUTPATIENT)
Dept: OTHER | Facility: HOSPITAL | Age: 75
Discharge: HOME | End: 2020-05-07
Attending: SPECIALIST

## 2020-05-07 LAB
INR PPP: 2.83 (ref 2–3)
PROTHROMBIN TIME: 27.5 S (ref 9.4–12)

## 2020-06-08 ENCOUNTER — HOSPITAL ENCOUNTER (OUTPATIENT)
Dept: CT IMAGING | Facility: HOSPITAL | Age: 75
Discharge: HOME | End: 2020-06-08
Attending: INTERNAL MEDICINE

## 2020-07-16 ENCOUNTER — PREP FOR SURGERY (OUTPATIENT)
Dept: OTHER | Facility: HOSPITAL | Age: 75
End: 2020-07-16

## 2020-07-16 ENCOUNTER — TELEPHONE (OUTPATIENT)
Dept: SURGERY | Facility: CLINIC | Age: 75
End: 2020-07-16

## 2020-07-16 ENCOUNTER — OFFICE VISIT (OUTPATIENT)
Dept: SURGERY | Facility: CLINIC | Age: 75
End: 2020-07-16

## 2020-07-16 VITALS
TEMPERATURE: 98.4 F | HEART RATE: 84 BPM | BODY MASS INDEX: 17.88 KG/M2 | WEIGHT: 118 LBS | DIASTOLIC BLOOD PRESSURE: 82 MMHG | HEIGHT: 68 IN | SYSTOLIC BLOOD PRESSURE: 150 MMHG | OXYGEN SATURATION: 98 %

## 2020-07-16 DIAGNOSIS — R13.19 ESOPHAGEAL DYSPHAGIA: Primary | ICD-10-CM

## 2020-07-16 DIAGNOSIS — C49.A1 GASTROINTESTINAL STROMAL TUMOR (GIST) OF ESOPHAGUS (HCC): Primary | ICD-10-CM

## 2020-07-16 DIAGNOSIS — Z98.890 HISTORY OF ESOPHAGECTOMY: ICD-10-CM

## 2020-07-16 DIAGNOSIS — R13.19 ESOPHAGEAL DYSPHAGIA: ICD-10-CM

## 2020-07-16 DIAGNOSIS — K58.0 IRRITABLE BOWEL SYNDROME WITH DIARRHEA: ICD-10-CM

## 2020-07-16 DIAGNOSIS — Z90.49 HISTORY OF ESOPHAGECTOMY: ICD-10-CM

## 2020-07-16 PROCEDURE — 99214 OFFICE O/P EST MOD 30 MIN: CPT | Performed by: THORACIC SURGERY (CARDIOTHORACIC VASCULAR SURGERY)

## 2020-07-16 RX ORDER — FUROSEMIDE 40 MG/1
40 TABLET ORAL DAILY
COMMUNITY
End: 2021-08-02 | Stop reason: HOSPADM

## 2020-07-16 RX ORDER — WARFARIN SODIUM 2 MG/1
2 TABLET ORAL
COMMUNITY
End: 2021-04-22

## 2020-07-16 RX ORDER — SPIRONOLACTONE 25 MG/1
25 TABLET ORAL DAILY
COMMUNITY
End: 2021-08-02 | Stop reason: HOSPADM

## 2020-07-16 NOTE — H&P (VIEW-ONLY)
Thoracic surgery progress note    Chief complaint dysphagia    Patient continues to have multiple factors responsible for her weight loss.  She currently weighs 118 pounds.  She is lost a total of approximately 60 pounds following surgery.  Her weight is now stable.  She has intermittent dysphasia.  She has difficulty with sticky solids.  When she has food sticking it is also accompanied by odynophagia and a visceral reaction.  She also has some symptoms of dumping.  Occasional diarrhea.  She is taking Gleevec and has the dose has been reduced her gastrointestinal symptoms have improved.  She does not have any fevers chills or night sweats.  No aspiration symptoms.  Her weight is been stable for 3 months she is gained 3 pounds over the past 3 months.  All systems have been reviewed.  Results are scanned in the chart they have been reviewed with the patient.    On physical examination she is well-appearing in no distress she is wearing a mask she is practicing COVID-19 precautions.  Symmetrical chest expansion abdomen scaphoid.  Extremities free of cyanosis clubbing or edema    Impression #1 history of esophagectomy no evidence of recurrent gist tumor but with significant weight loss now stable #2 significant dysphagia warrants repeat endoscopy and dilation of the anastomosis.  Of note I personally examined the patient CT scan and reviewed the radiology report.  I do not see any evidence of recurrence but she has a dilated proximal esophagus and evidence of a stricture at her anastomosis seen on CT scan.  Of also reviewed the radiology report and I concur with it  3.  High blood pressure medicated  4.  History of anticoagulation therapy need to hold Coumadin prior to surgery  5.  History of A. fib currently currently patient is in sinus rhythm.  Okay to hold Coumadin.

## 2020-07-22 ENCOUNTER — LAB (OUTPATIENT)
Dept: LAB | Facility: HOSPITAL | Age: 75
End: 2020-07-22

## 2020-07-22 PROCEDURE — C9803 HOPD COVID-19 SPEC COLLECT: HCPCS

## 2020-07-22 PROCEDURE — U0004 COV-19 TEST NON-CDC HGH THRU: HCPCS

## 2020-07-22 PROCEDURE — U0002 COVID-19 LAB TEST NON-CDC: HCPCS

## 2020-07-23 ENCOUNTER — ANESTHESIA EVENT (OUTPATIENT)
Dept: GASTROENTEROLOGY | Facility: HOSPITAL | Age: 75
End: 2020-07-23

## 2020-07-23 LAB
REF LAB TEST METHOD: NORMAL
SARS-COV-2 RNA RESP QL NAA+PROBE: NOT DETECTED

## 2020-07-24 ENCOUNTER — HOSPITAL ENCOUNTER (OUTPATIENT)
Facility: HOSPITAL | Age: 75
Setting detail: HOSPITAL OUTPATIENT SURGERY
Discharge: HOME | End: 2020-07-24
Attending: THORACIC SURGERY (CARDIOTHORACIC VASCULAR SURGERY) | Admitting: THORACIC SURGERY (CARDIOTHORACIC VASCULAR SURGERY)

## 2020-07-24 ENCOUNTER — APPOINTMENT (OUTPATIENT)
Dept: GENERAL RADIOLOGY | Facility: HOSPITAL | Age: 75
End: 2020-07-24

## 2020-07-24 ENCOUNTER — ANESTHESIA (OUTPATIENT)
Dept: GASTROENTEROLOGY | Facility: HOSPITAL | Age: 75
End: 2020-07-24

## 2020-07-24 VITALS
TEMPERATURE: 97.5 F | HEIGHT: 68 IN | SYSTOLIC BLOOD PRESSURE: 126 MMHG | RESPIRATION RATE: 16 BRPM | WEIGHT: 118.83 LBS | DIASTOLIC BLOOD PRESSURE: 63 MMHG | BODY MASS INDEX: 18.01 KG/M2 | HEART RATE: 82 BPM | OXYGEN SATURATION: 94 %

## 2020-07-24 LAB — GLUCOSE BLDC GLUCOMTR-MCNC: 132 MG/DL (ref 70–105)

## 2020-07-24 PROCEDURE — 25010000002 PROPOFOL 10 MG/ML EMULSION: Performed by: STUDENT IN AN ORGANIZED HEALTH CARE EDUCATION/TRAINING PROGRAM

## 2020-07-24 PROCEDURE — 71045 X-RAY EXAM CHEST 1 VIEW: CPT

## 2020-07-24 PROCEDURE — 43249 ESOPH EGD DILATION <30 MM: CPT | Performed by: THORACIC SURGERY (CARDIOTHORACIC VASCULAR SURGERY)

## 2020-07-24 PROCEDURE — C1726 CATH, BAL DIL, NON-VASCULAR: HCPCS | Performed by: THORACIC SURGERY (CARDIOTHORACIC VASCULAR SURGERY)

## 2020-07-24 PROCEDURE — 82962 GLUCOSE BLOOD TEST: CPT

## 2020-07-24 RX ORDER — SODIUM CHLORIDE 0.9 % (FLUSH) 0.9 %
10 SYRINGE (ML) INJECTION EVERY 12 HOURS SCHEDULED
Status: DISCONTINUED | OUTPATIENT
Start: 2020-07-24 | End: 2020-07-24 | Stop reason: HOSPADM

## 2020-07-24 RX ORDER — GLYCOPYRROLATE 0.2 MG/ML
INJECTION INTRAMUSCULAR; INTRAVENOUS AS NEEDED
Status: DISCONTINUED | OUTPATIENT
Start: 2020-07-24 | End: 2020-07-24 | Stop reason: SURG

## 2020-07-24 RX ORDER — SODIUM CHLORIDE 0.9 % (FLUSH) 0.9 %
10 SYRINGE (ML) INJECTION AS NEEDED
Status: DISCONTINUED | OUTPATIENT
Start: 2020-07-24 | End: 2020-07-24 | Stop reason: HOSPADM

## 2020-07-24 RX ORDER — SODIUM CHLORIDE 9 MG/ML
9 INJECTION, SOLUTION INTRAVENOUS CONTINUOUS PRN
Status: DISCONTINUED | OUTPATIENT
Start: 2020-07-24 | End: 2020-07-24 | Stop reason: HOSPADM

## 2020-07-24 RX ORDER — LIDOCAINE HYDROCHLORIDE 20 MG/ML
INJECTION, SOLUTION INFILTRATION; PERINEURAL AS NEEDED
Status: DISCONTINUED | OUTPATIENT
Start: 2020-07-24 | End: 2020-07-24 | Stop reason: SURG

## 2020-07-24 RX ORDER — PROPOFOL 10 MG/ML
VIAL (ML) INTRAVENOUS AS NEEDED
Status: DISCONTINUED | OUTPATIENT
Start: 2020-07-24 | End: 2020-07-24 | Stop reason: SURG

## 2020-07-24 RX ADMIN — PROPOFOL 300 MG: 10 INJECTION, EMULSION INTRAVENOUS at 11:05

## 2020-07-24 RX ADMIN — GLYCOPYRROLATE 0.2 MCG: 0.2 INJECTION, SOLUTION INTRAMUSCULAR; INTRAVENOUS at 11:02

## 2020-07-24 RX ADMIN — LIDOCAINE HYDROCHLORIDE 100 MG: 20 INJECTION, SOLUTION INFILTRATION; PERINEURAL at 11:02

## 2020-07-24 NOTE — ANESTHESIA PREPROCEDURE EVALUATION
Anesthesia Evaluation     Patient summary reviewed and Nursing notes reviewed   history of anesthetic complications: prolonged sedation  NPO Solid Status: > 8 hours  NPO Liquid Status: > 8 hours           Airway   Dental      Pulmonary    (+) a smoker Former,   Cardiovascular     ECG reviewed  PT is on anticoagulation therapy  Patient on routine beta blocker    (+) hypertension, dysrhythmias Atrial Fib, hyperlipidemia,       Neuro/Psych  (+) psychiatric history Depression,     GI/Hepatic/Renal/Endo    (+)  GERD,  diabetes mellitus, thyroid problem hypothyroidism    Musculoskeletal     Abdominal    Substance History      OB/GYN          Other   blood dyscrasia anemia,   history of cancer    ROS/Med Hx Other: Allergies, esophagitis, goiter, esophageal cancer, breast cancer, IBS, osteoporosis, dysphagia, hyponatremia    Echo  Excellent left ventricle contractility with ejection fraction of 65%.     No evidence for mitral tricuspid or aortic regurgitation is seen by Doppler  study.     No pericardial effusion or intracardiac thrombus is seen.    PSH  ESOPHAGOGASTRECTOMY ENDOSCOPY  ENDOSCOPY THORACOTOMY  BREAST BIOPSY LAPAROSCOPIC TUBAL LIGATION  BREAST BIOPSY ENDOSCOPY  ENDOSCOPY                 Anesthesia Plan    ASA 4     MAC   (Patient identified; pre-operative vital signs, all relevant labs/studies, complete medical/surgical/anesthetic history, full medication list, full allergy list, and NPO status obtained/reviewed; physical assessment performed; anesthetic options, side effects, potential complications, risks, and benefits discussed; questions answered; written anesthesia consent obtained; patient cleared for procedure; anesthesia machine and equipment checked and functioning)    Anesthetic plan, all risks, benefits, and alternatives have been provided, discussed and informed consent has been obtained with: patient.

## 2020-07-24 NOTE — ANESTHESIA POSTPROCEDURE EVALUATION
Patient: Camilla Marcos    Procedure Summary     Date:  07/24/20 Room / Location:  [facility] ENDOSCOPY 4 / [facility] ENDOSCOPY    Anesthesia Start:  1101 Anesthesia Stop:  1132    Procedure:  ESOPHAGOGASTRODUODENOSCOPY with balloon dilation(12mm-15mm up to 14.5mm) of esophageal anastomosis stricture (N/A ) Diagnosis:       Esophageal dysphagia      (Esophageal dysphagia [R13.10])    Surgeon:  Aldair Booker MD Provider:  Anuj Elizalde MD    Anesthesia Type:  MAC ASA Status:  4          Anesthesia Type: MAC    Vitals  Vitals Value Taken Time   /63 7/24/2020 12:03 PM   Temp     Pulse 77 7/24/2020 12:07 PM   Resp 16 7/24/2020 12:03 PM   SpO2 94 % 7/24/2020 12:07 PM   Vitals shown include unvalidated device data.        Post Anesthesia Care and Evaluation    Patient location during evaluation: PACU  Patient participation: complete - patient participated  Level of consciousness: awake  Pain score: 0  Pain management: adequate  Airway patency: patent  Anesthetic complications: No anesthetic complications  PONV Status: none  Cardiovascular status: acceptable  Respiratory status: acceptable  Hydration status: acceptable    Comments: Patient seen and examined postoperatively; vital signs stable; SpO2 greater than or equal to 90%; cardiopulmonary status stable; nausea/vomiting adequately controlled; pain adequately controlled; no apparent anesthesia complications; patient discharged from anesthesia care when discharge criteria were met

## 2020-07-24 NOTE — DISCHARGE INSTRUCTIONS
Anticipate a minor amount of spitting up of blood.  This should resolve in a day.  Call the office on Tuesday for a phone appointment to assess relief of dysphagia.

## 2020-07-24 NOTE — INTERVAL H&P NOTE
Patient seen and examined.  There are no changes to the history.  She has intermittent dysphagia.  Her weight is low but stable.  On physical examination she is in no acute distress sclera anicteric neck supple trachea midline chest expansion symmetrical abdomen nondistended nontender extremities free of cyanosis clubbing or edema mentation normal speech gait and station normal  Impression dysphagia following Iver Sunil esophagogastrectomy suspect anastomotic narrowing or lack of coaxial orientation between the stomach and the esophagus.  Plan endoscopy  H&P reviewed. The patient was examined and there are no changes to the H&P.

## 2020-07-24 NOTE — OP NOTE
ESOPHAGOGASTRODUODENOSCOPY  Procedure Report    Patient Name:  Camilla Marcos  YOB: 1945    Date of Surgery:  7/24/2020     Indications: Dysphagia, weight loss esophageal stricture    Pre-op Diagnosis:   Esophageal dysphagia [R13.10]       Post-Op Diagnosis Codes:     * Esophageal dysphagia [R13.10]    Procedure/CPT® Codes:      Procedure(s):  ESOPHAGOGASTRODUODENOSCOPY with balloon dilation(12mm-15mm up to 14.5mm) of esophageal    Staff:  Surgeon(s):  Aldair Booker MD         Anesthesia: Monitored Anesthesia Care    Estimated Blood Loss: minimal    Implants:    Nothing was implanted during the procedure    Specimen:          None        Findings: Satisfactory dilation with no evidence of perforation at esophageal anastomotic stricture.    Complications: None    Description of Procedure: This patient is 16 months following Iver Sunil esophagogastrectomy.  She has some degree of dysphagia and weight loss.  The proximal esophagus is dilated.  Endoscopy is indicated to assess the anastomosis and to relieve any underlying narrowing.    Monitored anesthesia care was used.  The Olympus endoscope was introduced per office.  The scope passed easily.  The anastomosis was approximately 29 cm.  The proximal esophagus was dilated the anastomosis allowed the passage of the endoscope without much resistance just mild resistance.  There did not appear to be a long stricture.  The anastomosis was eccentrically we located on the esophagus.  Scope was advanced into the stomach.  Mucosa at the anastomosis bled easily.  The rest of the gastric mucosa was unremarkable.  The pylorus was open and the duodenum was unremarkable.  Scope was slowly withdrawn a 12-15 balloon was advanced across the anastomosis.  And the balloon was slowly inflated from 12 mm to 14.5 mm.  Minor bleeding resulted.  Some disruption of the scar resulted.  There is no evidence of perforation.  The area was suctioned out.  I inspected the area  for approximately a minute.  Bleeding was minimal there was no transmural injury.  I evacuated the air and we removed the scope.  The patient tolerated the procedure well.      Aldair Booker MD     Date: 7/24/2020  Time: 11:29

## 2020-07-28 ENCOUNTER — OFFICE VISIT (OUTPATIENT)
Dept: SURGERY | Facility: CLINIC | Age: 75
End: 2020-07-28

## 2020-07-28 DIAGNOSIS — R13.19 ESOPHAGEAL DYSPHAGIA: Primary | ICD-10-CM

## 2020-07-28 PROCEDURE — 99441 PR PHYS/QHP TELEPHONE EVALUATION 5-10 MIN: CPT | Performed by: THORACIC SURGERY (CARDIOTHORACIC VASCULAR SURGERY)

## 2020-07-28 NOTE — PROGRESS NOTES
You have chosen to receive care through a telephone visit. Do you consent to use a telephone visit for your medical care today? Yes    Patient is interviewed over the phone following esophagogastroscopy with balloon dilation of the anastomosis.  She does not note any significant change in her dysphagia.  Overall her energy feels better today.  She is in good spirits.    In discussion with the patient she does not want to undergo further dilations.  They are probably not helping.  She has an eccentrically located anastomosis.  She is not having aspiration symptoms.    She also continues on Gleevec.  That produces nausea and lowers her appetite.  Her weight loss is most likely due to Gleevec.    Plan to get a follow-up CT scan in 6 months.

## 2020-10-02 ENCOUNTER — HOSPITAL ENCOUNTER (OUTPATIENT)
Dept: CT IMAGING | Facility: HOSPITAL | Age: 75
Discharge: HOME | End: 2020-10-02
Attending: INTERNAL MEDICINE

## 2020-10-21 ENCOUNTER — HOSPITAL ENCOUNTER (OUTPATIENT)
Dept: MAMMOGRAPHY | Facility: HOSPITAL | Age: 75
Discharge: HOME | End: 2020-10-21
Attending: INTERNAL MEDICINE

## 2021-02-01 ENCOUNTER — TELEPHONE (OUTPATIENT)
Dept: SURGERY | Facility: CLINIC | Age: 76
End: 2021-02-01

## 2021-02-01 NOTE — TELEPHONE ENCOUNTER
I called patient to check up on testing not completed and no follow up appt. patient was admitted for esophageal tear at [place], this was misread on her ct chest [facility]. she had reapeat ct at [facility] 12-22-20 she was transferrred to [facility] and found to have no esophageal tear and did not have surgery. she has declined appt to follow up with Dr. Bettencourt or do a repeat ct chest by us. She is following up with Dr. Landry and his orders for CT chest at [place]. She will call and make a follow up if dr. landry wants her to.

## 2021-03-12 ENCOUNTER — HOSPITAL ENCOUNTER (OUTPATIENT)
Dept: CT IMAGING | Facility: HOSPITAL | Age: 76
Discharge: HOME | End: 2021-03-12
Attending: INTERNAL MEDICINE

## 2021-04-22 ENCOUNTER — PREP FOR SURGERY (OUTPATIENT)
Dept: OTHER | Facility: HOSPITAL | Age: 76
End: 2021-04-22

## 2021-04-22 ENCOUNTER — OFFICE VISIT (OUTPATIENT)
Dept: SURGERY | Facility: CLINIC | Age: 76
End: 2021-04-22

## 2021-04-22 VITALS
TEMPERATURE: 97.5 F | HEIGHT: 68 IN | HEART RATE: 82 BPM | BODY MASS INDEX: 18.94 KG/M2 | SYSTOLIC BLOOD PRESSURE: 167 MMHG | DIASTOLIC BLOOD PRESSURE: 86 MMHG | OXYGEN SATURATION: 98 % | WEIGHT: 125 LBS

## 2021-04-22 DIAGNOSIS — R13.19 ESOPHAGEAL DYSPHAGIA: Primary | ICD-10-CM

## 2021-04-22 DIAGNOSIS — C49.A1 GASTROINTESTINAL STROMAL TUMOR (GIST) OF ESOPHAGUS (HCC): Primary | ICD-10-CM

## 2021-04-22 DIAGNOSIS — R13.19 ESOPHAGEAL DYSPHAGIA: ICD-10-CM

## 2021-04-22 DIAGNOSIS — K21.9 GASTRO-ESOPHAGEAL REFLUX DISEASE WITHOUT ESOPHAGITIS: ICD-10-CM

## 2021-04-22 PROCEDURE — 99214 OFFICE O/P EST MOD 30 MIN: CPT | Performed by: THORACIC SURGERY (CARDIOTHORACIC VASCULAR SURGERY)

## 2021-04-22 RX ORDER — SODIUM CHLORIDE 0.9 % (FLUSH) 0.9 %
3-10 SYRINGE (ML) INJECTION AS NEEDED
Status: CANCELLED | OUTPATIENT
Start: 2021-04-22

## 2021-04-22 RX ORDER — FERROUS SULFATE 325(65) MG
325 TABLET ORAL
COMMUNITY
End: 2021-09-01

## 2021-04-22 RX ORDER — APIXABAN 5 MG/1
5 TABLET, FILM COATED ORAL EVERY 12 HOURS SCHEDULED
Status: ON HOLD | COMMUNITY
Start: 2021-03-24 | End: 2022-06-28 | Stop reason: SDUPTHER

## 2021-04-22 RX ORDER — SODIUM CHLORIDE 0.9 % (FLUSH) 0.9 %
3 SYRINGE (ML) INJECTION EVERY 12 HOURS SCHEDULED
Status: CANCELLED | OUTPATIENT
Start: 2021-04-22

## 2021-04-22 NOTE — PROGRESS NOTES
"Chief Complaint  Esophageal Cancer (follow up ct chest )    Subjective          Camilla Marcos presents to [facility] THORACIC SURGERY in follow-up for an esophageal gastrointestinal stromal tumor.  History of Present Illness  Ms. 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  She did well with that procedure but has struggled with dysphagia since.  She has had multiple EGDs with dilations secondary to anastomotic stricture.  She states that she is doing okay at this point from a dysphagia perspective, although, must be very careful what she consumes as she still feels that solid food is getting stuck in her mid chest.  She is able to tolerate liquids without difficulty.    The patient has recently stopped Gleevec treatment secondary to overall side effects including orthostatic hypotension and fall from standing with a spinal injury.  She is feeling much better since she stopped the Gleevec and is currently gaining weight and resuming all of her normal activity.  Objective   Vital Signs:   /86 (BP Location: Left arm, Patient Position: Sitting, Cuff Size: Adult)   Pulse 82   Temp 97.5 °F (36.4 °C) (Temporal)   Ht 172.7 cm (68\")   Wt 56.7 kg (125 lb)   SpO2 98%   BMI 19.01 kg/m²     Physical Exam  Constitutional:       Appearance: Normal appearance.   HENT:      Head: Normocephalic.   Eyes:      Extraocular Movements: Extraocular movements intact.   Pulmonary:      Effort: Pulmonary effort is normal.   Musculoskeletal:      Cervical back: Normal range of motion.   Neurological:      General: No focal deficit present.      Mental Status: She is alert.   Psychiatric:         Mood and Affect: Mood normal.         Thought Content: Thought content normal.        Result Review :       Data reviewed: Radiologic studies :     I have independently reviewed the CT of the chest " abdomen pelvis performed on 3/12/2021 which demonstrates scarring consistent with her prior right thoracotomy and Royce Sunil esophagectomy.  Her proximal esophagus is quite dilated, similar to her prior CT scan.  There is an obvious neck at her anastomosis.  There is scarring in the right upper lobe consistent with her surgical procedure.  There is an enlarged left hemithyroid consistent with goiter.  There are no new nodules.  There is no mediastinal hilar or abdominal lymphadenopathy.  There is no pleural or pericardial effusion.     Assessment and Plan      Ms. Marcos is a very pleasant 75-year-old lady status post [place] Sunil esophagectomy in March 2019 for a YPT3N0 gastrointestinal stromal tumor.  She has continued adjuvant therapy with Gleevec since prior to her procedure, but recently stopped secondary to side effects.  She has no evidence of disease at this point.    She does have a significant esophagogastric anastomotic stricture and would definitely benefit from upper endoscopy with dilation.  Given that her proximal esophagus is becoming more dilated, I do not want to leave her alone and allow her proximal esophagus to continue to dilate.  We have discussed this at length today and we will plan an EGD in the near future.    She will need continued follow-up for her GIST with serial CT scans every 4 months.  These are being ordered by her oncologist at [facility] and I will plan to see her at [facility] in 4 months in follow-up with her scans.  Diagnoses and all orders for this visit:    1. Gastrointestinal stromal tumor (GIST) of esophagus (CMS/HCC) (Primary)    2. Esophageal dysphagia      I spent 37 minutes caring for Camilla on this date of service. This time includes time spent by me in the following activities:preparing for the visit, reviewing tests, obtaining and/or reviewing a separately obtained history, performing a medically appropriate examination and/or evaluation , counseling and  educating the patient/family/caregiver, ordering medications, tests, or procedures, referring and communicating with other health care professionals  and documenting information in the medical record  Follow Up   No follow-ups on file.  Patient was given instructions and counseling regarding her condition or for health maintenance advice. Please see specific information pulled into the AVS if appropriate.

## 2021-05-04 RX ORDER — IMATINIB MESYLATE 400 MG/1
200 TABLET, FILM COATED ORAL DAILY
Status: ON HOLD | COMMUNITY
End: 2021-07-22

## 2021-05-10 ENCOUNTER — HOSPITAL ENCOUNTER (OUTPATIENT)
Dept: PREADMISSION TESTING | Facility: HOSPITAL | Age: 76
Discharge: HOME | End: 2021-05-10
Attending: THORACIC SURGERY (CARDIOTHORACIC VASCULAR SURGERY)

## 2021-05-10 ENCOUNTER — HOSPITAL ENCOUNTER (OUTPATIENT)
Dept: OTHER | Facility: HOSPITAL | Age: 76
Discharge: HOME | End: 2021-05-10
Attending: THORACIC SURGERY (CARDIOTHORACIC VASCULAR SURGERY)

## 2021-05-10 LAB
ANION GAP SERPL CALC-SCNC: 13 MMOL/L (ref 8–19)
BUN SERPL-MCNC: 11 MG/DL (ref 5–25)
BUN/CREAT SERPL: 14 {RATIO} (ref 6–20)
CALCIUM SERPL-MCNC: 9.1 MG/DL (ref 8.7–10.4)
CHLORIDE SERPL-SCNC: 107 MMOL/L (ref 99–111)
CONV CO2: 26 MMOL/L (ref 22–32)
CREAT UR-MCNC: 0.79 MG/DL (ref 0.5–0.9)
ERYTHROCYTE [DISTWIDTH] IN BLOOD BY AUTOMATED COUNT: 13.8 % (ref 11.7–14.4)
GFR SERPLBLD BASED ON 1.73 SQ M-ARVRAT: >60 ML/MIN/{1.73_M2}
GLUCOSE SERPL-MCNC: 111 MG/DL (ref 65–99)
HCT VFR BLD AUTO: 38.6 % (ref 37–47)
HGB BLD-MCNC: 12.4 G/DL (ref 12–16)
MCH RBC QN AUTO: 28.6 PG (ref 27–31)
MCHC RBC AUTO-ENTMCNC: 32.1 G/DL (ref 33–37)
MCV RBC AUTO: 88.9 FL (ref 81–99)
OSMOLALITY SERPL CALC.SUM OF ELEC: 294 MOSM/KG (ref 273–304)
PLATELET # BLD AUTO: 401 10*3/UL (ref 130–400)
PMV BLD AUTO: 9.7 FL (ref 9.4–12.3)
POTASSIUM SERPL-SCNC: 3.9 MMOL/L (ref 3.5–5.3)
RBC # BLD AUTO: 4.34 10*6/UL (ref 4.2–5.4)
SODIUM SERPL-SCNC: 142 MMOL/L (ref 135–147)
WBC # BLD AUTO: 11.85 10*3/UL (ref 4.8–10.8)

## 2021-05-11 LAB — SARS-COV-2 RNA SPEC QL NAA+PROBE: NOT DETECTED

## 2021-05-13 ENCOUNTER — APPOINTMENT (OUTPATIENT)
Dept: GENERAL RADIOLOGY | Facility: HOSPITAL | Age: 76
End: 2021-05-13

## 2021-05-13 ENCOUNTER — ANESTHESIA EVENT (OUTPATIENT)
Dept: GASTROENTEROLOGY | Facility: HOSPITAL | Age: 76
End: 2021-05-13

## 2021-05-13 ENCOUNTER — ANESTHESIA (OUTPATIENT)
Dept: GASTROENTEROLOGY | Facility: HOSPITAL | Age: 76
End: 2021-05-13

## 2021-05-13 ENCOUNTER — HOSPITAL ENCOUNTER (OUTPATIENT)
Facility: HOSPITAL | Age: 76
Setting detail: HOSPITAL OUTPATIENT SURGERY
Discharge: HOME | End: 2021-05-13
Attending: THORACIC SURGERY (CARDIOTHORACIC VASCULAR SURGERY) | Admitting: THORACIC SURGERY (CARDIOTHORACIC VASCULAR SURGERY)

## 2021-05-13 VITALS
HEART RATE: 79 BPM | HEIGHT: 68 IN | DIASTOLIC BLOOD PRESSURE: 96 MMHG | OXYGEN SATURATION: 97 % | WEIGHT: 123.68 LBS | SYSTOLIC BLOOD PRESSURE: 129 MMHG | BODY MASS INDEX: 18.74 KG/M2 | TEMPERATURE: 98.6 F | RESPIRATION RATE: 22 BRPM

## 2021-05-13 DIAGNOSIS — R13.19 ESOPHAGEAL DYSPHAGIA: ICD-10-CM

## 2021-05-13 LAB — GLUCOSE BLDC GLUCOMTR-MCNC: 128 MG/DL (ref 70–105)

## 2021-05-13 PROCEDURE — 71045 X-RAY EXAM CHEST 1 VIEW: CPT

## 2021-05-13 PROCEDURE — 25010000002 FENTANYL CITRATE (PF) 100 MCG/2ML SOLUTION: Performed by: NURSE ANESTHETIST, CERTIFIED REGISTERED

## 2021-05-13 PROCEDURE — 25010000002 PROPOFOL 10 MG/ML EMULSION: Performed by: NURSE ANESTHETIST, CERTIFIED REGISTERED

## 2021-05-13 PROCEDURE — 25010000002 SUCCINYLCHOLINE PER 20 MG: Performed by: NURSE ANESTHETIST, CERTIFIED REGISTERED

## 2021-05-13 PROCEDURE — 43249 ESOPH EGD DILATION <30 MM: CPT | Performed by: THORACIC SURGERY (CARDIOTHORACIC VASCULAR SURGERY)

## 2021-05-13 PROCEDURE — 82962 GLUCOSE BLOOD TEST: CPT

## 2021-05-13 RX ORDER — SUCCINYLCHOLINE CHLORIDE 20 MG/ML
INJECTION INTRAMUSCULAR; INTRAVENOUS AS NEEDED
Status: DISCONTINUED | OUTPATIENT
Start: 2021-05-13 | End: 2021-05-13 | Stop reason: SURG

## 2021-05-13 RX ORDER — SODIUM CHLORIDE 0.9 % (FLUSH) 0.9 %
3-10 SYRINGE (ML) INJECTION AS NEEDED
Status: DISCONTINUED | OUTPATIENT
Start: 2021-05-13 | End: 2021-05-13 | Stop reason: HOSPADM

## 2021-05-13 RX ORDER — SODIUM CHLORIDE 0.9 % (FLUSH) 0.9 %
3 SYRINGE (ML) INJECTION EVERY 12 HOURS SCHEDULED
Status: DISCONTINUED | OUTPATIENT
Start: 2021-05-13 | End: 2021-05-13 | Stop reason: HOSPADM

## 2021-05-13 RX ORDER — FENTANYL CITRATE 50 UG/ML
INJECTION, SOLUTION INTRAMUSCULAR; INTRAVENOUS AS NEEDED
Status: DISCONTINUED | OUTPATIENT
Start: 2021-05-13 | End: 2021-05-13 | Stop reason: SURG

## 2021-05-13 RX ORDER — PHENYLEPHRINE HCL IN 0.9% NACL 1 MG/10 ML
SYRINGE (ML) INTRAVENOUS AS NEEDED
Status: DISCONTINUED | OUTPATIENT
Start: 2021-05-13 | End: 2021-05-13 | Stop reason: SURG

## 2021-05-13 RX ORDER — PROPOFOL 10 MG/ML
VIAL (ML) INTRAVENOUS AS NEEDED
Status: DISCONTINUED | OUTPATIENT
Start: 2021-05-13 | End: 2021-05-13 | Stop reason: SURG

## 2021-05-13 RX ORDER — SODIUM CHLORIDE 9 MG/ML
INJECTION, SOLUTION INTRAVENOUS CONTINUOUS PRN
Status: DISCONTINUED | OUTPATIENT
Start: 2021-05-13 | End: 2021-05-13 | Stop reason: SURG

## 2021-05-13 RX ADMIN — SUCCINYLCHOLINE CHLORIDE 100 MG: 20 INJECTION, SOLUTION INTRAMUSCULAR; INTRAVENOUS at 13:08

## 2021-05-13 RX ADMIN — SUCCINYLCHOLINE CHLORIDE 20 MG: 20 INJECTION, SOLUTION INTRAMUSCULAR; INTRAVENOUS at 13:15

## 2021-05-13 RX ADMIN — Medication 100 MCG: at 13:21

## 2021-05-13 RX ADMIN — PROPOFOL 120 MG: 10 INJECTION, EMULSION INTRAVENOUS at 13:08

## 2021-05-13 RX ADMIN — FENTANYL CITRATE 25 MCG: 50 INJECTION, SOLUTION INTRAMUSCULAR; INTRAVENOUS at 13:39

## 2021-05-13 RX ADMIN — PROPOFOL 25 MG: 10 INJECTION, EMULSION INTRAVENOUS at 13:15

## 2021-05-13 RX ADMIN — FENTANYL CITRATE 25 MCG: 50 INJECTION, SOLUTION INTRAMUSCULAR; INTRAVENOUS at 13:23

## 2021-05-13 RX ADMIN — SODIUM CHLORIDE: 0.9 INJECTION, SOLUTION INTRAVENOUS at 13:03

## 2021-05-13 NOTE — ANESTHESIA PREPROCEDURE EVALUATION
Anesthesia Evaluation     Patient summary reviewed and Nursing notes reviewed   no history of anesthetic complications:  NPO Solid Status: > 8 hours  NPO Liquid Status: > 2 hours           Airway   Mallampati: II  TM distance: >3 FB  Neck ROM: full  No difficulty expected  Dental - normal exam     Pulmonary - normal exam   (+) a smoker Former,   Cardiovascular - normal exam    ECG reviewed    (+) hypertension, dysrhythmias Atrial Fib, hyperlipidemia,       Neuro/Psych  (+) psychiatric history Anxiety and Depression,     GI/Hepatic/Renal/Endo    (+)  GERD,  diabetes mellitus,     ROS Comment: S/P Royce-Sunil Esophagectomy with Dr. Booker several years ago.  Previously dilated by him in 2019, now for dilation again.    Musculoskeletal (-) negative ROS    Abdominal  - normal exam   Substance History - negative use     OB/GYN negative ob/gyn ROS         Other      history of cancer                    Anesthesia Plan    ASA 3     general     intravenous induction     Anesthetic plan, all risks, benefits, and alternatives have been provided, discussed and informed consent has been obtained with: patient.    Plan discussed with CRNA.

## 2021-05-13 NOTE — ANESTHESIA POSTPROCEDURE EVALUATION
Patient: Camilla Marcos    Procedure Summary     Date: 05/13/21 Room / [facility]: [facility] ENDOSCOPY 2 / [facility]    Anesthesia Start: 1303 Anesthesia Stop: 1343    Procedure: ESOPHAGOGASTRODUODENOSCOPY WITH BALLOON DILATION UP TO 19MM (N/A ) Diagnosis:       Esophageal dysphagia      (Esophageal dysphagia [R13.10])    Surgeons: Lily Bettencourt MD Provider: Anuj Elizalde MD    Anesthesia Type: general ASA Status: 3          Anesthesia Type: general    Vitals  Vitals Value Taken Time   /63 05/13/21 1428   Temp     Pulse 77 05/13/21 1430   Resp 22 05/13/21 1423   SpO2 92 % 05/13/21 1430   Vitals shown include unvalidated device data.        Post Anesthesia Care and Evaluation    Patient location during evaluation: PACU  Patient participation: complete - patient participated  Level of consciousness: awake  Pain score: 0  Pain management: adequate  Airway patency: patent  Anesthetic complications: No anesthetic complications  PONV Status: none  Cardiovascular status: acceptable  Respiratory status: acceptable  Hydration status: acceptable    Comments: Patient seen and examined postoperatively; vital signs stable; SpO2 greater than or equal to 90%; cardiopulmonary status stable; nausea/vomiting adequately controlled; pain adequately controlled; no apparent anesthesia complications; patient discharged from anesthesia care when discharge criteria were met

## 2021-05-13 NOTE — ANESTHESIA PROCEDURE NOTES
Airway  Date/Time: 5/13/2021 1:10 PM  Airway not difficult    General Information and Staff    Patient location during procedure: OR  Anesthesiologist: Anuj Elizalde MD  CRNA: Kendra Castellanos CRNA    Indications and Patient Condition  Indications for airway management: airway protection    Preoxygenated: yes  MILS maintained throughout  Mask difficulty assessment: 0 - not attempted    Final Airway Details  Final airway type: endotracheal airway      Successful airway: ETT  Cuffed: yes   Successful intubation technique: direct laryngoscopy  Facilitating devices/methods: intubating stylet  Endotracheal tube insertion site: oral  Blade: Alexis  Blade size: 3  ETT size (mm): 6.5  Cormack-Lehane Classification: grade I - full view of glottis  Placement verified by: chest auscultation, capnometry and palpation of cuff   Measured from: lips  ETT/EBT  to lips (cm): 20  Number of attempts at approach: 1  Assessment: lips, teeth, and gum same as pre-op and atraumatic intubation

## 2021-05-18 ENCOUNTER — TELEPHONE (OUTPATIENT)
Dept: SURGERY | Facility: CLINIC | Age: 76
End: 2021-05-18

## 2021-05-18 ENCOUNTER — PREP FOR SURGERY (OUTPATIENT)
Dept: OTHER | Facility: HOSPITAL | Age: 76
End: 2021-05-18

## 2021-05-18 DIAGNOSIS — R13.19 ESOPHAGEAL DYSPHAGIA: Primary | ICD-10-CM

## 2021-05-18 DIAGNOSIS — Z91.89 AT HIGH RISK FOR BLEEDING: ICD-10-CM

## 2021-05-18 RX ORDER — SODIUM CHLORIDE 0.9 % (FLUSH) 0.9 %
3-10 SYRINGE (ML) INJECTION AS NEEDED
Status: CANCELLED | OUTPATIENT
Start: 2021-05-18

## 2021-05-18 RX ORDER — SODIUM CHLORIDE 0.9 % (FLUSH) 0.9 %
3 SYRINGE (ML) INJECTION EVERY 12 HOURS SCHEDULED
Status: CANCELLED | OUTPATIENT
Start: 2021-05-18

## 2021-05-18 NOTE — TELEPHONE ENCOUNTER
Patient called thinking that someone was going to call her and set up another bronch. States that her and her daughter thought you said to repeat bronch in 1 month and someone would call to schedule that. Please let me know if she needs an office follow up of just a repeat bronch

## 2021-06-04 ENCOUNTER — TELEPHONE (OUTPATIENT)
Dept: SURGERY | Facility: CLINIC | Age: 76
End: 2021-06-04

## 2021-06-04 NOTE — TELEPHONE ENCOUNTER
I called to let patient know we recieved cardiac clearance and she is to stop blood thinner 2 days prior, left 2 message and asked that she call back to make sure she received the message.

## 2021-06-10 DIAGNOSIS — C49.A1 GASTROINTESTINAL STROMAL TUMOR (GIST) OF ESOPHAGUS (HCC): Primary | ICD-10-CM

## 2021-06-14 ENCOUNTER — TRANSCRIBE ORDERS (OUTPATIENT)
Dept: ADMINISTRATIVE | Facility: HOSPITAL | Age: 76
End: 2021-06-14

## 2021-06-14 DIAGNOSIS — E03.9 HYPOTHYROIDISM, UNSPECIFIED TYPE: ICD-10-CM

## 2021-06-14 DIAGNOSIS — E11.00 TYPE II DIABETES MELLITUS WITH HYPEROSMOLARITY, UNCONTROLLED (HCC): Primary | ICD-10-CM

## 2021-06-14 DIAGNOSIS — E11.65 TYPE II DIABETES MELLITUS WITH HYPEROSMOLARITY, UNCONTROLLED (HCC): Primary | ICD-10-CM

## 2021-06-21 ENCOUNTER — LAB (OUTPATIENT)
Dept: LAB | Facility: HOSPITAL | Age: 76
End: 2021-06-21

## 2021-06-21 ENCOUNTER — HOSPITAL ENCOUNTER (OUTPATIENT)
Dept: CARDIOLOGY | Facility: HOSPITAL | Age: 76
Discharge: HOME | End: 2021-06-21

## 2021-06-21 DIAGNOSIS — R13.19 ESOPHAGEAL DYSPHAGIA: ICD-10-CM

## 2021-06-21 DIAGNOSIS — Z91.89 AT HIGH RISK FOR BLEEDING: ICD-10-CM

## 2021-06-21 LAB
ANION GAP SERPL CALCULATED.3IONS-SCNC: 7.9 MMOL/L (ref 5–15)
BASOPHILS # BLD AUTO: 0.05 10*3/MM3 (ref 0–0.2)
BASOPHILS NFR BLD AUTO: 0.5 % (ref 0–1.5)
BUN SERPL-MCNC: 10 MG/DL (ref 8–23)
BUN/CREAT SERPL: 11.4 (ref 7–25)
CALCIUM SPEC-SCNC: 8.8 MG/DL (ref 8.6–10.5)
CHLORIDE SERPL-SCNC: 101 MMOL/L (ref 98–107)
CO2 SERPL-SCNC: 30.1 MMOL/L (ref 22–29)
CREAT SERPL-MCNC: 0.88 MG/DL (ref 0.57–1)
DEPRECATED RDW RBC AUTO: 40.2 FL (ref 37–54)
EOSINOPHIL # BLD AUTO: 0.12 10*3/MM3 (ref 0–0.4)
EOSINOPHIL NFR BLD AUTO: 1.3 % (ref 0.3–6.2)
ERYTHROCYTE [DISTWIDTH] IN BLOOD BY AUTOMATED COUNT: 12.7 % (ref 12.3–15.4)
GFR SERPL CREATININE-BSD FRML MDRD: 63 ML/MIN/1.73
GLUCOSE SERPL-MCNC: 130 MG/DL (ref 65–99)
HCT VFR BLD AUTO: 36 % (ref 34–46.6)
HGB BLD-MCNC: 12 G/DL (ref 12–15.9)
IMM GRANULOCYTES # BLD AUTO: 0.03 10*3/MM3 (ref 0–0.05)
IMM GRANULOCYTES NFR BLD AUTO: 0.3 % (ref 0–0.5)
LYMPHOCYTES # BLD AUTO: 2.47 10*3/MM3 (ref 0.7–3.1)
LYMPHOCYTES NFR BLD AUTO: 26.4 % (ref 19.6–45.3)
MCH RBC QN AUTO: 28.9 PG (ref 26.6–33)
MCHC RBC AUTO-ENTMCNC: 33.3 G/DL (ref 31.5–35.7)
MCV RBC AUTO: 86.7 FL (ref 79–97)
MONOCYTES # BLD AUTO: 0.89 10*3/MM3 (ref 0.1–0.9)
MONOCYTES NFR BLD AUTO: 9.5 % (ref 5–12)
NEUTROPHILS NFR BLD AUTO: 5.8 10*3/MM3 (ref 1.7–7)
NEUTROPHILS NFR BLD AUTO: 62 % (ref 42.7–76)
NRBC BLD AUTO-RTO: 0 /100 WBC (ref 0–0.2)
PLATELET # BLD AUTO: 378 10*3/MM3 (ref 140–450)
PMV BLD AUTO: 10 FL (ref 6–12)
POTASSIUM SERPL-SCNC: 3.4 MMOL/L (ref 3.5–5.2)
QT INTERVAL: 373 MS
RBC # BLD AUTO: 4.15 10*6/MM3 (ref 3.77–5.28)
SODIUM SERPL-SCNC: 139 MMOL/L (ref 136–145)
WBC # BLD AUTO: 9.36 10*3/MM3 (ref 3.4–10.8)

## 2021-06-21 PROCEDURE — 93005 ELECTROCARDIOGRAM TRACING: CPT | Performed by: NURSE PRACTITIONER

## 2021-06-21 PROCEDURE — 85025 COMPLETE CBC W/AUTO DIFF WBC: CPT

## 2021-06-21 PROCEDURE — 80048 BASIC METABOLIC PNL TOTAL CA: CPT

## 2021-06-21 PROCEDURE — 36415 COLL VENOUS BLD VENIPUNCTURE: CPT

## 2021-06-22 NOTE — PAT
[facility] calling stating pt is there for Covid test and that they do not do Covid testing at the hospital.  Caller gave 2 different #'s that both rang disconnected.  Called pt back to tell her unable to make appt as # incorrect.  Received different #'s and they then geve me the disconnected # again.  Called pt and we will do Covid test DOS and pt to arrive 1 hour prior to original. (1030)  Pt will be at endo 1030.  Melissa in Endo then notified of pt arriveal early for Covid test.

## 2021-06-24 ENCOUNTER — ANESTHESIA (OUTPATIENT)
Dept: GASTROENTEROLOGY | Facility: HOSPITAL | Age: 76
End: 2021-06-24

## 2021-06-24 ENCOUNTER — PREP FOR SURGERY (OUTPATIENT)
Dept: OTHER | Facility: HOSPITAL | Age: 76
End: 2021-06-24

## 2021-06-24 ENCOUNTER — APPOINTMENT (OUTPATIENT)
Dept: GENERAL RADIOLOGY | Facility: HOSPITAL | Age: 76
End: 2021-06-24

## 2021-06-24 ENCOUNTER — ANESTHESIA EVENT (OUTPATIENT)
Dept: GASTROENTEROLOGY | Facility: HOSPITAL | Age: 76
End: 2021-06-24

## 2021-06-24 ENCOUNTER — HOSPITAL ENCOUNTER (OUTPATIENT)
Facility: HOSPITAL | Age: 76
Setting detail: HOSPITAL OUTPATIENT SURGERY
Discharge: HOME | End: 2021-06-24
Attending: THORACIC SURGERY (CARDIOTHORACIC VASCULAR SURGERY) | Admitting: THORACIC SURGERY (CARDIOTHORACIC VASCULAR SURGERY)

## 2021-06-24 VITALS
WEIGHT: 123.68 LBS | HEART RATE: 58 BPM | SYSTOLIC BLOOD PRESSURE: 100 MMHG | HEIGHT: 68 IN | DIASTOLIC BLOOD PRESSURE: 59 MMHG | OXYGEN SATURATION: 96 % | BODY MASS INDEX: 18.74 KG/M2 | TEMPERATURE: 97.7 F | RESPIRATION RATE: 13 BRPM

## 2021-06-24 DIAGNOSIS — R13.19 ESOPHAGEAL DYSPHAGIA: ICD-10-CM

## 2021-06-24 DIAGNOSIS — R13.19 ESOPHAGEAL DYSPHAGIA: Primary | ICD-10-CM

## 2021-06-24 LAB — SARS-COV-2 RNA PNL SPEC NAA+PROBE: NOT DETECTED

## 2021-06-24 PROCEDURE — 25010000002 ONDANSETRON PER 1 MG: Performed by: ANESTHESIOLOGY

## 2021-06-24 PROCEDURE — 25010000002 DEXAMETHASONE PER 1 MG: Performed by: ANESTHESIOLOGY

## 2021-06-24 PROCEDURE — 25010000002 PROPOFOL 10 MG/ML EMULSION: Performed by: ANESTHESIOLOGY

## 2021-06-24 PROCEDURE — 87635 SARS-COV-2 COVID-19 AMP PRB: CPT | Performed by: THORACIC SURGERY (CARDIOTHORACIC VASCULAR SURGERY)

## 2021-06-24 PROCEDURE — 25010000002 NEOSTIGMINE 5 MG/5ML SOLUTION: Performed by: ANESTHESIOLOGY

## 2021-06-24 PROCEDURE — 71045 X-RAY EXAM CHEST 1 VIEW: CPT

## 2021-06-24 PROCEDURE — C9803 HOPD COVID-19 SPEC COLLECT: HCPCS

## 2021-06-24 PROCEDURE — 43249 ESOPH EGD DILATION <30 MM: CPT | Performed by: THORACIC SURGERY (CARDIOTHORACIC VASCULAR SURGERY)

## 2021-06-24 PROCEDURE — S0260 H&P FOR SURGERY: HCPCS | Performed by: THORACIC SURGERY (CARDIOTHORACIC VASCULAR SURGERY)

## 2021-06-24 PROCEDURE — 25010000002 FENTANYL CITRATE (PF) 100 MCG/2ML SOLUTION: Performed by: ANESTHESIOLOGY

## 2021-06-24 RX ORDER — FENTANYL CITRATE 50 UG/ML
INJECTION, SOLUTION INTRAMUSCULAR; INTRAVENOUS AS NEEDED
Status: DISCONTINUED | OUTPATIENT
Start: 2021-06-24 | End: 2021-06-24 | Stop reason: SURG

## 2021-06-24 RX ORDER — ROCURONIUM BROMIDE 10 MG/ML
INJECTION, SOLUTION INTRAVENOUS AS NEEDED
Status: DISCONTINUED | OUTPATIENT
Start: 2021-06-24 | End: 2021-06-24 | Stop reason: SURG

## 2021-06-24 RX ORDER — SODIUM CHLORIDE 0.9 % (FLUSH) 0.9 %
3 SYRINGE (ML) INJECTION EVERY 12 HOURS SCHEDULED
Status: DISCONTINUED | OUTPATIENT
Start: 2021-06-24 | End: 2021-06-24 | Stop reason: HOSPADM

## 2021-06-24 RX ORDER — PROPOFOL 10 MG/ML
VIAL (ML) INTRAVENOUS AS NEEDED
Status: DISCONTINUED | OUTPATIENT
Start: 2021-06-24 | End: 2021-06-24 | Stop reason: SURG

## 2021-06-24 RX ORDER — SODIUM CHLORIDE 9 MG/ML
1000 INJECTION, SOLUTION INTRAVENOUS CONTINUOUS
Status: DISCONTINUED | OUTPATIENT
Start: 2021-06-24 | End: 2021-06-24 | Stop reason: HOSPADM

## 2021-06-24 RX ORDER — GLYCOPYRROLATE 1 MG/5 ML
SYRINGE (ML) INTRAVENOUS AS NEEDED
Status: DISCONTINUED | OUTPATIENT
Start: 2021-06-24 | End: 2021-06-24 | Stop reason: SURG

## 2021-06-24 RX ORDER — LABETALOL HYDROCHLORIDE 5 MG/ML
5 INJECTION, SOLUTION INTRAVENOUS
Status: DISCONTINUED | OUTPATIENT
Start: 2021-06-24 | End: 2021-06-24 | Stop reason: HOSPADM

## 2021-06-24 RX ORDER — SODIUM CHLORIDE 0.9 % (FLUSH) 0.9 %
3-10 SYRINGE (ML) INJECTION AS NEEDED
Status: CANCELLED | OUTPATIENT
Start: 2021-06-24

## 2021-06-24 RX ORDER — DEXAMETHASONE SODIUM PHOSPHATE 4 MG/ML
INJECTION, SOLUTION INTRA-ARTICULAR; INTRALESIONAL; INTRAMUSCULAR; INTRAVENOUS; SOFT TISSUE AS NEEDED
Status: DISCONTINUED | OUTPATIENT
Start: 2021-06-24 | End: 2021-06-24 | Stop reason: SURG

## 2021-06-24 RX ORDER — LIDOCAINE HYDROCHLORIDE 20 MG/ML
INJECTION, SOLUTION EPIDURAL; INFILTRATION; INTRACAUDAL; PERINEURAL AS NEEDED
Status: DISCONTINUED | OUTPATIENT
Start: 2021-06-24 | End: 2021-06-24 | Stop reason: SURG

## 2021-06-24 RX ORDER — NEOSTIGMINE METHYLSULFATE 5 MG/5 ML
SYRINGE (ML) INTRAVENOUS AS NEEDED
Status: DISCONTINUED | OUTPATIENT
Start: 2021-06-24 | End: 2021-06-24 | Stop reason: SURG

## 2021-06-24 RX ORDER — ONDANSETRON 2 MG/ML
4 INJECTION INTRAMUSCULAR; INTRAVENOUS ONCE AS NEEDED
Status: COMPLETED | OUTPATIENT
Start: 2021-06-24 | End: 2021-06-24

## 2021-06-24 RX ORDER — SODIUM CHLORIDE 0.9 % (FLUSH) 0.9 %
3-10 SYRINGE (ML) INJECTION AS NEEDED
Status: DISCONTINUED | OUTPATIENT
Start: 2021-06-24 | End: 2021-06-24 | Stop reason: HOSPADM

## 2021-06-24 RX ORDER — SODIUM CHLORIDE 0.9 % (FLUSH) 0.9 %
3 SYRINGE (ML) INJECTION EVERY 12 HOURS SCHEDULED
Status: CANCELLED | OUTPATIENT
Start: 2021-06-24

## 2021-06-24 RX ADMIN — FENTANYL CITRATE 25 MCG: 50 INJECTION, SOLUTION INTRAMUSCULAR; INTRAVENOUS at 13:21

## 2021-06-24 RX ADMIN — Medication 0.4 MG: at 13:33

## 2021-06-24 RX ADMIN — PROPOFOL 150 MG: 10 INJECTION, EMULSION INTRAVENOUS at 13:22

## 2021-06-24 RX ADMIN — Medication 2 MG: at 13:33

## 2021-06-24 RX ADMIN — ONDANSETRON 4 MG: 2 INJECTION INTRAMUSCULAR; INTRAVENOUS at 13:34

## 2021-06-24 RX ADMIN — ROCURONIUM BROMIDE 20 MG: 10 INJECTION INTRAVENOUS at 13:22

## 2021-06-24 RX ADMIN — SODIUM CHLORIDE: 9 INJECTION, SOLUTION INTRAVENOUS at 13:17

## 2021-06-24 RX ADMIN — SODIUM CHLORIDE 1000 ML: 9 INJECTION, SOLUTION INTRAVENOUS at 11:00

## 2021-06-24 RX ADMIN — DEXAMETHASONE SODIUM PHOSPHATE 4 MG: 4 INJECTION, SOLUTION INTRAMUSCULAR; INTRAVENOUS at 13:34

## 2021-06-24 RX ADMIN — LIDOCAINE HYDROCHLORIDE 30 MG: 20 INJECTION, SOLUTION EPIDURAL; INFILTRATION; INTRACAUDAL; PERINEURAL at 13:22

## 2021-06-24 NOTE — H&P
[facility]   HISTORY AND PHYSICAL    Patient Name: Camilla Marcos  : 1945  MRN: [id number]  Primary Care Physician:  Homero Ro MD  Date of admission: 2021    Subjective   Subjective     Chief Complaint: Dysphagia    History of Present Illness  Ms. 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  She did well with that procedure but has struggled with dysphagia since.  She has had multiple EGDs with dilations secondary to anastomotic stricture.  She states that she is doing okay at this point from a dysphagia perspective, although, must be very careful what she consumes as she still feels that solid food is getting stuck in her mid chest.  She is able to tolerate liquids without difficulty.     The patient has recently stopped Gleevec treatment secondary to overall side effects including orthostatic hypotension and fall from standing with a spinal injury.  She is feeling much better since she stopped the Gleevec and is currently gaining weight and resuming all of her normal activity.    She is s/p dilation last month and is doing better after dilation.       Personal History     Past Medical History:   Diagnosis Date   • A-fib (CMS/HCC)     dr. Chon Pope   • Delayed emergence from anesthesia    • Depression     situational   • Diabetes (CMS/HCC)     diet controlled   • Dysphagia    • Esophageal cancer (CMS/HCC)    • GERD (gastroesophageal reflux disease)    • HX: breast cancer     right   • Hypertension    • Leg swelling     left leg d/t chemo pill   • Osteoporosis    • Thyroid disorder        Past Surgical History:   Procedure Laterality Date   • BREAST BIOPSY Right    • BREAST BIOPSY Left    • ENDOSCOPY  2019   • ENDOSCOPY  2018   • ENDOSCOPY N/A 2019    Procedure: ESOPHAGOGASTRODUODENOSCOPY with DILATATION (12-15mm balloon);   Surgeon: Aldair Booker MD;  Location: [facility] ENDOSCOPY;  Service: Gastroenterology   • ENDOSCOPY N/A 12/16/2019    Procedure: ESOPHAGOGASTRODUODENOSCOPY with balloon dilitation 15-18mm) up to 16mm;  Surgeon: Aldair Booker MD;  Location: [facility] ENDOSCOPY;  Service: Gastroenterology   • ENDOSCOPY N/A 7/24/2020    Procedure: ESOPHAGOGASTRODUODENOSCOPY with balloon dilation(12mm-15mm up to 14.5mm) of esophageal anastomosis stricture;  Surgeon: Aldair Booker MD;  Location: [facility] ENDOSCOPY;  Service: Gastroenterology;  Laterality: N/A;  esophageal stricture   • ENDOSCOPY N/A 5/13/2021    Procedure: ESOPHAGOGASTRODUODENOSCOPY WITH BALLOON DILATION UP TO 19MM;  Surgeon: Lily Bettencourt MD;  Location: [facility] ENDOSCOPY;  Service: Gastroenterology;  Laterality: N/A;  ANASTAMOTIC STRICTURE   • ESOPHAGOGASTRECTOMY  03/04/2019    Royce Sunil   • LAPAROSCOPIC TUBAL LIGATION     • THORACOTOMY Right 08/17/2018    right vats biopsy of mediastinal mass, right thoracotomy       Family History: family history includes Bone cancer in her mother; Diabetes in her daughter and maternal grandfather; Hypertension in her daughter and mother; Ulcerative colitis in her father. Otherwise pertinent FHx was reviewed and not pertinent to current issue.    Social History:  reports that she has quit smoking. She has never used smokeless tobacco. She reports that she does not drink alcohol and does not use drugs.    Home Medications:  Cyanocobalamin, apixaban, cetirizine, diphenhydrAMINE, ferrous sulfate, furosemide, imatinib, levothyroxine, metoprolol succinate XL, omeprazole, ondansetron, potassium chloride, and spironolactone    Allergies:  Allergies   Allergen Reactions   • Amoxicillin Rash   • Contrast Dye Rash   • Iodine Rash       Objective    Objective     Vitals:   Temp:  [97.7 °F (36.5 °C)] 97.7 °F (36.5 °C)  Heart Rate:  [67] 67  Resp:  [14] 14  BP: (163)/(74) 163/74    Physical Exam  Vitals and nursing note reviewed.    Constitutional:       Appearance: She is well-developed.   HENT:      Head: Normocephalic and atraumatic.      Nose: Nose normal.   Eyes:      Conjunctiva/sclera: Conjunctivae normal.   Cardiovascular:      Rate and Rhythm: Normal rate.   Pulmonary:      Effort: Pulmonary effort is normal.   Abdominal:      Palpations: Abdomen is soft.   Skin:     General: Skin is warm and dry.   Neurological:      Mental Status: She is alert and oriented to person, place, and time.   Psychiatric:         Behavior: Behavior normal.         Thought Content: Thought content normal.         Judgment: Judgment normal.         Result Review    Result Review:  I have personally reviewed the results from the time of this admission to 6/24/2021 13:16 EDT and agree with these findings:  []  Laboratory  []  Microbiology  []  Radiology  []  EKG/Telemetry   []  Cardiology/Vascular   []  Pathology  []  Old records  []  Other:      Assessment/Plan   Assessment / Plan     Brief Patient Summary:  Camilla Marcos is a 75 y.o. female who has an esophagogastric anastomotic stricture.    Active Hospital Problems:  Active Hospital Problems    Diagnosis    • **Esophageal dysphagia      Added automatically from request for surgery [id number]       Plan:   EGD with dilation.       Electronically signed by Lily Bettencourt MD, 06/24/21, 1:16 PM EDT.

## 2021-06-24 NOTE — ANESTHESIA PREPROCEDURE EVALUATION
Anesthesia Evaluation     Patient summary reviewed and Nursing notes reviewed   NPO Solid Status: > 6 hours  NPO Liquid Status: > 6 hours           Airway   Mallampati: II  TM distance: >3 FB  Neck ROM: full  No difficulty expected  Dental - normal exam     Pulmonary - negative pulmonary ROS and normal exam    breath sounds clear to auscultation  Cardiovascular - normal exam    ECG reviewed  Rhythm: regular  Rate: normal    (+) hypertension, dysrhythmias, hyperlipidemia,       Neuro/Psych  (+) psychiatric history,     GI/Hepatic/Renal/Endo    (+)  GERD,  diabetes mellitus,     Musculoskeletal (-) negative ROS    Abdominal  - normal exam    Abdomen: soft.  Bowel sounds: normal.   Substance History - negative use     OB/GYN negative ob/gyn ROS         Other      history of cancer                    Anesthesia Plan    ASA 3     MAC     intravenous induction     Anesthetic plan, all risks, benefits, and alternatives have been provided, discussed and informed consent has been obtained with: patient.  Use of blood products discussed with patient .   Plan discussed with CRNA.

## 2021-06-24 NOTE — ANESTHESIA PROCEDURE NOTES
Airway  Urgency: elective    Date/Time: 6/24/2021 1:24 PM  Airway not difficult    General Information and Staff    Patient location during procedure: OR  Anesthesiologist: Will Cleary MD    Indications and Patient Condition  Indications for airway management: airway protection    Preoxygenated: yes  MILS not maintained throughout  Mask difficulty assessment: 0 - not attempted    Final Airway Details  Final airway type: endotracheal airway      Successful airway: ETT  Cuffed: yes   Successful intubation technique: direct laryngoscopy  Endotracheal tube insertion site: oral  Blade: Alexis  Blade size: 3  ETT size (mm): 7.0  Cormack-Lehane Classification: grade IIa - partial view of glottis  Measured from: teeth  ETT/EBT  to teeth (cm): 19  Number of attempts at approach: 1  Assessment: lips, teeth, and gum same as pre-op and atraumatic intubation

## 2021-06-25 ENCOUNTER — TELEPHONE (OUTPATIENT)
Dept: SURGERY | Facility: CLINIC | Age: 76
End: 2021-06-25

## 2021-06-25 NOTE — ANESTHESIA POSTPROCEDURE EVALUATION
Patient: Camilla Marcos    Procedure Summary     Date: 06/24/21 Room / Location: [facility] ENDOSCOPY 4 / [facility] ENDOSCOPY    Anesthesia Start: 1318 Anesthesia Stop: 1345    Procedure: ESOPHAGOGASTRODUODENOSCOPY WITH  DILATATION WITH BALLOON (18-20MM, UP TO 20MM) (N/A ) Diagnosis:       Esophageal dysphagia      (Esophageal dysphagia [R13.10])    Surgeons: Lily Bettencourt MD Provider: Will Cleray MD    Anesthesia Type: MAC ASA Status: 3          Anesthesia Type: MAC    Vitals  Vitals Value Taken Time   /59 06/24/21 1435   Temp     Pulse 53 06/24/21 1438   Resp 13 06/24/21 1435   SpO2 96 % 06/24/21 1437   Vitals shown include unvalidated device data.        Post Anesthesia Care and Evaluation    Patient location during evaluation: PACU  Patient participation: complete - patient participated  Level of consciousness: awake  Pain scale: See nurse's notes for pain score.  Pain management: adequate  Airway patency: patent  Anesthetic complications: No anesthetic complications  PONV Status: none  Cardiovascular status: acceptable  Respiratory status: acceptable  Hydration status: acceptable    Comments: Patient seen and examined postoperatively; vital signs stable; SpO2 greater than or equal to 90%; cardiopulmonary status stable; nausea/vomiting adequately controlled; pain adequately controlled; no apparent anesthesia complications; patient discharged from anesthesia care when discharge criteria were met

## 2021-06-25 NOTE — TELEPHONE ENCOUNTER
I called and left a message to check on her postoperatively.    Patient called back ,she had some questions on diet. I advised slippery foods, small bites and sips of water. She was doing well and [facility] to make next EDG appt

## 2021-07-06 ENCOUNTER — HOSPITAL ENCOUNTER (OUTPATIENT)
Dept: CT IMAGING | Facility: HOSPITAL | Age: 76
Discharge: HOME | End: 2021-07-06

## 2021-07-06 DIAGNOSIS — E03.9 HYPOTHYROIDISM, UNSPECIFIED TYPE: ICD-10-CM

## 2021-07-06 DIAGNOSIS — E11.65 TYPE II DIABETES MELLITUS WITH HYPEROSMOLARITY, UNCONTROLLED (HCC): ICD-10-CM

## 2021-07-06 DIAGNOSIS — E11.00 TYPE II DIABETES MELLITUS WITH HYPEROSMOLARITY, UNCONTROLLED (HCC): ICD-10-CM

## 2021-07-06 PROCEDURE — 0 IOPAMIDOL PER 1 ML: Performed by: INTERNAL MEDICINE

## 2021-07-06 PROCEDURE — 74177 CT ABD & PELVIS W/CONTRAST: CPT

## 2021-07-06 PROCEDURE — 71260 CT THORAX DX C+: CPT

## 2021-07-06 RX ADMIN — IOPAMIDOL 100 ML: 755 INJECTION, SOLUTION INTRAVENOUS at 13:35

## 2021-07-19 DIAGNOSIS — Z01.818 PRE-OP EXAM: Primary | ICD-10-CM

## 2021-07-20 ENCOUNTER — TRANSCRIBE ORDERS (OUTPATIENT)
Dept: ADMINISTRATIVE | Facility: HOSPITAL | Age: 76
End: 2021-07-20

## 2021-07-20 DIAGNOSIS — R91.1 LUNG NODULE: Primary | ICD-10-CM

## 2021-07-21 ENCOUNTER — HOSPITAL ENCOUNTER (OUTPATIENT)
Dept: PET IMAGING | Facility: HOSPITAL | Age: 76
Discharge: HOME | End: 2021-07-21

## 2021-07-21 DIAGNOSIS — R91.1 LUNG NODULE: ICD-10-CM

## 2021-07-21 PROCEDURE — 0 FLUDEOXYGLUCOSE F18 SOLUTION: Performed by: INTERNAL MEDICINE

## 2021-07-21 PROCEDURE — A9552 F18 FDG: HCPCS | Performed by: INTERNAL MEDICINE

## 2021-07-21 PROCEDURE — 78815 PET IMAGE W/CT SKULL-THIGH: CPT

## 2021-07-21 RX ADMIN — FLUDEOXYGLUCOSE F18 1 DOSE: 300 INJECTION INTRAVENOUS at 10:53

## 2021-07-22 ENCOUNTER — HOSPITAL ENCOUNTER (INPATIENT)
Facility: HOSPITAL | Age: 76
LOS: 11 days | Discharge: HOME HEALTH | End: 2021-08-02
Attending: INTERNAL MEDICINE | Admitting: HOSPITALIST

## 2021-07-22 ENCOUNTER — LAB (OUTPATIENT)
Dept: LAB | Facility: HOSPITAL | Age: 76
End: 2021-07-22

## 2021-07-22 ENCOUNTER — HOSPITAL ENCOUNTER (OUTPATIENT)
Facility: HOSPITAL | Age: 76
Setting detail: SURGERY ADMIT
End: 2021-07-22
Attending: THORACIC SURGERY (CARDIOTHORACIC VASCULAR SURGERY) | Admitting: THORACIC SURGERY (CARDIOTHORACIC VASCULAR SURGERY)

## 2021-07-22 ENCOUNTER — ANESTHESIA EVENT (OUTPATIENT)
Dept: GASTROENTEROLOGY | Facility: HOSPITAL | Age: 76
End: 2021-07-22

## 2021-07-22 ENCOUNTER — TRANSCRIBE ORDERS (OUTPATIENT)
Dept: ADMINISTRATIVE | Facility: HOSPITAL | Age: 76
End: 2021-07-22

## 2021-07-22 ENCOUNTER — PREP FOR SURGERY (OUTPATIENT)
Dept: OTHER | Facility: HOSPITAL | Age: 76
End: 2021-07-22

## 2021-07-22 ENCOUNTER — HOSPITAL ENCOUNTER (INPATIENT)
Facility: HOSPITAL | Age: 76
LOS: 1 days | Discharge: OTHER INSTITUTION | End: 2021-07-22
Attending: THORACIC SURGERY (CARDIOTHORACIC VASCULAR SURGERY) | Admitting: THORACIC SURGERY (CARDIOTHORACIC VASCULAR SURGERY)

## 2021-07-22 ENCOUNTER — ANESTHESIA (OUTPATIENT)
Dept: GASTROENTEROLOGY | Facility: HOSPITAL | Age: 76
End: 2021-07-22

## 2021-07-22 VITALS
BODY MASS INDEX: 20.73 KG/M2 | OXYGEN SATURATION: 99 % | HEART RATE: 69 BPM | WEIGHT: 124.4 LBS | RESPIRATION RATE: 15 BRPM | SYSTOLIC BLOOD PRESSURE: 155 MMHG | TEMPERATURE: 97.6 F | HEIGHT: 65 IN | DIASTOLIC BLOOD PRESSURE: 68 MMHG

## 2021-07-22 DIAGNOSIS — R13.19 ESOPHAGEAL DYSPHAGIA: Primary | ICD-10-CM

## 2021-07-22 DIAGNOSIS — K22.4 ESOPHAGEAL SPASM: Primary | ICD-10-CM

## 2021-07-22 DIAGNOSIS — R13.19 ESOPHAGEAL DYSPHAGIA: ICD-10-CM

## 2021-07-22 DIAGNOSIS — Z01.818 PRE-OP EXAM: ICD-10-CM

## 2021-07-22 DIAGNOSIS — C49.A1 GASTROINTESTINAL STROMAL TUMOR (GIST) OF ESOPHAGUS (HCC): ICD-10-CM

## 2021-07-22 PROBLEM — K91.89 GASTRIC LEAK: Status: ACTIVE | Noted: 2021-07-22

## 2021-07-22 LAB
ABO GROUP BLD: NORMAL
ANION GAP SERPL CALCULATED.3IONS-SCNC: 11 MMOL/L (ref 5–15)
BASOPHILS # BLD AUTO: 0.1 10*3/MM3 (ref 0–0.2)
BASOPHILS NFR BLD AUTO: 0.6 % (ref 0–1.5)
BLD GP AB SCN SERPL QL: NEGATIVE
BUN SERPL-MCNC: 11 MG/DL (ref 8–23)
BUN/CREAT SERPL: 16.7 (ref 7–25)
CALCIUM SPEC-SCNC: 9.5 MG/DL (ref 8.6–10.5)
CHLORIDE SERPL-SCNC: 102 MMOL/L (ref 98–107)
CO2 SERPL-SCNC: 29 MMOL/L (ref 22–29)
CREAT SERPL-MCNC: 0.66 MG/DL (ref 0.57–1)
DEPRECATED RDW RBC AUTO: 43.8 FL (ref 37–54)
EOSINOPHIL # BLD AUTO: 0.1 10*3/MM3 (ref 0–0.4)
EOSINOPHIL NFR BLD AUTO: 1.4 % (ref 0.3–6.2)
ERYTHROCYTE [DISTWIDTH] IN BLOOD BY AUTOMATED COUNT: 14.5 % (ref 12.3–15.4)
GFR SERPL CREATININE-BSD FRML MDRD: 87 ML/MIN/1.73
GLUCOSE SERPL-MCNC: 134 MG/DL (ref 65–99)
HCT VFR BLD AUTO: 37.6 % (ref 34–46.6)
HGB BLD-MCNC: 12.7 G/DL (ref 12–15.9)
LYMPHOCYTES # BLD AUTO: 1.8 10*3/MM3 (ref 0.7–3.1)
LYMPHOCYTES NFR BLD AUTO: 21 % (ref 19.6–45.3)
MCH RBC QN AUTO: 29 PG (ref 26.6–33)
MCHC RBC AUTO-ENTMCNC: 33.7 G/DL (ref 31.5–35.7)
MCV RBC AUTO: 86.1 FL (ref 79–97)
MONOCYTES # BLD AUTO: 0.9 10*3/MM3 (ref 0.1–0.9)
MONOCYTES NFR BLD AUTO: 10.5 % (ref 5–12)
NEUTROPHILS NFR BLD AUTO: 5.7 10*3/MM3 (ref 1.7–7)
NEUTROPHILS NFR BLD AUTO: 66.5 % (ref 42.7–76)
NRBC BLD AUTO-RTO: 0.1 /100 WBC (ref 0–0.2)
PLATELET # BLD AUTO: 385 10*3/MM3 (ref 140–450)
PMV BLD AUTO: 7.4 FL (ref 6–12)
POTASSIUM SERPL-SCNC: 3.4 MMOL/L (ref 3.5–5.2)
RBC # BLD AUTO: 4.37 10*6/MM3 (ref 3.77–5.28)
RH BLD: NEGATIVE
SARS-COV-2 RNA PNL SPEC NAA+PROBE: NOT DETECTED
SODIUM SERPL-SCNC: 142 MMOL/L (ref 136–145)
T&S EXPIRATION DATE: NORMAL
WBC # BLD AUTO: 8.5 10*3/MM3 (ref 3.4–10.8)

## 2021-07-22 PROCEDURE — 25010000002 ONDANSETRON PER 1 MG: Performed by: ANESTHESIOLOGY

## 2021-07-22 PROCEDURE — 25010000002 NEOSTIGMINE 5 MG/5ML SOLUTION: Performed by: ANESTHESIOLOGY

## 2021-07-22 PROCEDURE — C9803 HOPD COVID-19 SPEC COLLECT: HCPCS

## 2021-07-22 PROCEDURE — 25010000002 HEPARIN (PORCINE) PER 1000 UNITS: Performed by: THORACIC SURGERY (CARDIOTHORACIC VASCULAR SURGERY)

## 2021-07-22 PROCEDURE — U0005 INFEC AGEN DETEC AMPLI PROBE: HCPCS

## 2021-07-22 PROCEDURE — 86901 BLOOD TYPING SEROLOGIC RH(D): CPT

## 2021-07-22 PROCEDURE — 25010000002 FENTANYL CITRATE (PF) 100 MCG/2ML SOLUTION: Performed by: ANESTHESIOLOGY

## 2021-07-22 PROCEDURE — 0DJ08ZZ INSPECTION OF UPPER INTESTINAL TRACT, VIA NATURAL OR ARTIFICIAL OPENING ENDOSCOPIC: ICD-10-PCS | Performed by: THORACIC SURGERY (CARDIOTHORACIC VASCULAR SURGERY)

## 2021-07-22 PROCEDURE — 86850 RBC ANTIBODY SCREEN: CPT

## 2021-07-22 PROCEDURE — 36415 COLL VENOUS BLD VENIPUNCTURE: CPT

## 2021-07-22 PROCEDURE — 85025 COMPLETE CBC W/AUTO DIFF WBC: CPT

## 2021-07-22 PROCEDURE — 86900 BLOOD TYPING SEROLOGIC ABO: CPT

## 2021-07-22 PROCEDURE — 80048 BASIC METABOLIC PNL TOTAL CA: CPT

## 2021-07-22 PROCEDURE — 25010000002 PROPOFOL 10 MG/ML EMULSION: Performed by: ANESTHESIOLOGY

## 2021-07-22 PROCEDURE — 25010000002 DEXAMETHASONE PER 1 MG: Performed by: ANESTHESIOLOGY

## 2021-07-22 PROCEDURE — 43266 EGD ENDOSCOPIC STENT PLACE: CPT | Performed by: THORACIC SURGERY (CARDIOTHORACIC VASCULAR SURGERY)

## 2021-07-22 PROCEDURE — U0003 INFECTIOUS AGENT DETECTION BY NUCLEIC ACID (DNA OR RNA); SEVERE ACUTE RESPIRATORY SYNDROME CORONAVIRUS 2 (SARS-COV-2) (CORONAVIRUS DISEASE [COVID-19]), AMPLIFIED PROBE TECHNIQUE, MAKING USE OF HIGH THROUGHPUT TECHNOLOGIES AS DESCRIBED BY CMS-2020-01-R: HCPCS

## 2021-07-22 RX ORDER — ONDANSETRON 4 MG/1
4 TABLET, FILM COATED ORAL EVERY 6 HOURS PRN
Status: DISCONTINUED | OUTPATIENT
Start: 2021-07-22 | End: 2021-07-23

## 2021-07-22 RX ORDER — ACETAMINOPHEN 650 MG/1
650 SUPPOSITORY RECTAL EVERY 4 HOURS PRN
Status: DISCONTINUED | OUTPATIENT
Start: 2021-07-22 | End: 2021-07-23

## 2021-07-22 RX ORDER — ALUMINA, MAGNESIA, AND SIMETHICONE 2400; 2400; 240 MG/30ML; MG/30ML; MG/30ML
15 SUSPENSION ORAL EVERY 6 HOURS PRN
Status: DISCONTINUED | OUTPATIENT
Start: 2021-07-22 | End: 2021-07-23

## 2021-07-22 RX ORDER — NEOSTIGMINE METHYLSULFATE 5 MG/5 ML
SYRINGE (ML) INTRAVENOUS AS NEEDED
Status: DISCONTINUED | OUTPATIENT
Start: 2021-07-22 | End: 2021-07-22 | Stop reason: SURG

## 2021-07-22 RX ORDER — ROCURONIUM BROMIDE 10 MG/ML
INJECTION, SOLUTION INTRAVENOUS AS NEEDED
Status: DISCONTINUED | OUTPATIENT
Start: 2021-07-22 | End: 2021-07-22 | Stop reason: SURG

## 2021-07-22 RX ORDER — FENTANYL CITRATE 50 UG/ML
INJECTION, SOLUTION INTRAMUSCULAR; INTRAVENOUS AS NEEDED
Status: DISCONTINUED | OUTPATIENT
Start: 2021-07-22 | End: 2021-07-22 | Stop reason: SURG

## 2021-07-22 RX ORDER — PHENYLEPHRINE HCL IN 0.9% NACL 1 MG/10 ML
SYRINGE (ML) INTRAVENOUS AS NEEDED
Status: DISCONTINUED | OUTPATIENT
Start: 2021-07-22 | End: 2021-07-22 | Stop reason: SURG

## 2021-07-22 RX ORDER — ONDANSETRON 2 MG/ML
INJECTION INTRAMUSCULAR; INTRAVENOUS AS NEEDED
Status: DISCONTINUED | OUTPATIENT
Start: 2021-07-22 | End: 2021-07-22 | Stop reason: SURG

## 2021-07-22 RX ORDER — ACETAMINOPHEN 325 MG/1
650 TABLET ORAL EVERY 4 HOURS PRN
Status: DISCONTINUED | OUTPATIENT
Start: 2021-07-22 | End: 2021-07-23

## 2021-07-22 RX ORDER — IPRATROPIUM BROMIDE AND ALBUTEROL SULFATE 2.5; .5 MG/3ML; MG/3ML
3 SOLUTION RESPIRATORY (INHALATION) ONCE AS NEEDED
Status: DISCONTINUED | OUTPATIENT
Start: 2021-07-22 | End: 2021-07-22

## 2021-07-22 RX ORDER — DEXAMETHASONE SODIUM PHOSPHATE 4 MG/ML
INJECTION, SOLUTION INTRA-ARTICULAR; INTRALESIONAL; INTRAMUSCULAR; INTRAVENOUS; SOFT TISSUE AS NEEDED
Status: DISCONTINUED | OUTPATIENT
Start: 2021-07-22 | End: 2021-07-22 | Stop reason: SURG

## 2021-07-22 RX ORDER — ONDANSETRON 2 MG/ML
4 INJECTION INTRAMUSCULAR; INTRAVENOUS EVERY 6 HOURS PRN
Status: DISCONTINUED | OUTPATIENT
Start: 2021-07-22 | End: 2021-07-23

## 2021-07-22 RX ORDER — MEPERIDINE HYDROCHLORIDE 25 MG/ML
12.5 INJECTION INTRAMUSCULAR; INTRAVENOUS; SUBCUTANEOUS
Status: DISCONTINUED | OUTPATIENT
Start: 2021-07-22 | End: 2021-07-22

## 2021-07-22 RX ORDER — HEPARIN SODIUM 5000 [USP'U]/ML
5000 INJECTION, SOLUTION INTRAVENOUS; SUBCUTANEOUS EVERY 8 HOURS SCHEDULED
Status: DISCONTINUED | OUTPATIENT
Start: 2021-07-22 | End: 2021-07-22 | Stop reason: HOSPADM

## 2021-07-22 RX ORDER — PROPOFOL 10 MG/ML
VIAL (ML) INTRAVENOUS AS NEEDED
Status: DISCONTINUED | OUTPATIENT
Start: 2021-07-22 | End: 2021-07-22 | Stop reason: SURG

## 2021-07-22 RX ORDER — ONDANSETRON 2 MG/ML
4 INJECTION INTRAMUSCULAR; INTRAVENOUS ONCE AS NEEDED
Status: DISCONTINUED | OUTPATIENT
Start: 2021-07-22 | End: 2021-07-22

## 2021-07-22 RX ORDER — SODIUM CHLORIDE 0.9 % (FLUSH) 0.9 %
10 SYRINGE (ML) INJECTION EVERY 12 HOURS SCHEDULED
Status: DISCONTINUED | OUTPATIENT
Start: 2021-07-22 | End: 2021-07-22 | Stop reason: HOSPADM

## 2021-07-22 RX ORDER — ACETAMINOPHEN 160 MG/5ML
650 SOLUTION ORAL EVERY 4 HOURS PRN
Status: DISCONTINUED | OUTPATIENT
Start: 2021-07-22 | End: 2021-07-23

## 2021-07-22 RX ORDER — ONDANSETRON 2 MG/ML
4 INJECTION INTRAMUSCULAR; INTRAVENOUS EVERY 6 HOURS PRN
Status: DISCONTINUED | OUTPATIENT
Start: 2021-07-22 | End: 2021-07-22 | Stop reason: HOSPADM

## 2021-07-22 RX ORDER — SODIUM CHLORIDE 0.9 % (FLUSH) 0.9 %
3-10 SYRINGE (ML) INJECTION AS NEEDED
Status: DISCONTINUED | OUTPATIENT
Start: 2021-07-22 | End: 2021-07-22

## 2021-07-22 RX ORDER — HYDROCODONE BITARTRATE AND ACETAMINOPHEN 5; 325 MG/1; MG/1
1 TABLET ORAL ONCE AS NEEDED
Status: DISCONTINUED | OUTPATIENT
Start: 2021-07-22 | End: 2021-07-22

## 2021-07-22 RX ORDER — CEFAZOLIN SODIUM 2 G/100ML
2 INJECTION, SOLUTION INTRAVENOUS ONCE
Status: CANCELLED | OUTPATIENT
Start: 2021-07-23 | End: 2021-07-22

## 2021-07-22 RX ORDER — SODIUM CHLORIDE 9 MG/ML
75 INJECTION, SOLUTION INTRAVENOUS ONCE
Status: CANCELLED | OUTPATIENT
Start: 2021-07-23 | End: 2021-07-23

## 2021-07-22 RX ORDER — SODIUM CHLORIDE 0.9 % (FLUSH) 0.9 %
3 SYRINGE (ML) INJECTION EVERY 12 HOURS SCHEDULED
Status: DISCONTINUED | OUTPATIENT
Start: 2021-07-22 | End: 2021-07-22

## 2021-07-22 RX ORDER — SODIUM CHLORIDE 0.9 % (FLUSH) 0.9 %
10 SYRINGE (ML) INJECTION AS NEEDED
Status: DISCONTINUED | OUTPATIENT
Start: 2021-07-22 | End: 2021-07-22 | Stop reason: HOSPADM

## 2021-07-22 RX ORDER — SODIUM CHLORIDE 9 MG/ML
1000 INJECTION, SOLUTION INTRAVENOUS CONTINUOUS
Status: DISCONTINUED | OUTPATIENT
Start: 2021-07-22 | End: 2021-07-22 | Stop reason: HOSPADM

## 2021-07-22 RX ORDER — SODIUM CHLORIDE 0.9 % (FLUSH) 0.9 %
10 SYRINGE (ML) INJECTION AS NEEDED
Status: DISCONTINUED | OUTPATIENT
Start: 2021-07-22 | End: 2021-08-02 | Stop reason: HOSPADM

## 2021-07-22 RX ORDER — FENTANYL CITRATE 50 UG/ML
25 INJECTION, SOLUTION INTRAMUSCULAR; INTRAVENOUS
Status: DISCONTINUED | OUTPATIENT
Start: 2021-07-22 | End: 2021-07-22

## 2021-07-22 RX ORDER — DIPHENHYDRAMINE HYDROCHLORIDE 50 MG/ML
12.5 INJECTION INTRAMUSCULAR; INTRAVENOUS
Status: DISCONTINUED | OUTPATIENT
Start: 2021-07-22 | End: 2021-07-22

## 2021-07-22 RX ORDER — NITROGLYCERIN 0.4 MG/1
0.4 TABLET SUBLINGUAL
Status: DISCONTINUED | OUTPATIENT
Start: 2021-07-22 | End: 2021-08-02 | Stop reason: HOSPADM

## 2021-07-22 RX ORDER — FENTANYL CITRATE 50 UG/ML
50 INJECTION, SOLUTION INTRAMUSCULAR; INTRAVENOUS
Status: DISCONTINUED | OUTPATIENT
Start: 2021-07-22 | End: 2021-07-22

## 2021-07-22 RX ORDER — GLYCOPYRROLATE 1 MG/5 ML
SYRINGE (ML) INTRAVENOUS AS NEEDED
Status: DISCONTINUED | OUTPATIENT
Start: 2021-07-22 | End: 2021-07-22 | Stop reason: SURG

## 2021-07-22 RX ORDER — HYDROCODONE BITARTRATE AND ACETAMINOPHEN 10; 325 MG/1; MG/1
1 TABLET ORAL EVERY 4 HOURS PRN
Status: DISCONTINUED | OUTPATIENT
Start: 2021-07-22 | End: 2021-07-22

## 2021-07-22 RX ORDER — ONDANSETRON 4 MG/1
4 TABLET, FILM COATED ORAL EVERY 6 HOURS PRN
Status: DISCONTINUED | OUTPATIENT
Start: 2021-07-22 | End: 2021-07-22 | Stop reason: HOSPADM

## 2021-07-22 RX ORDER — HYDROMORPHONE HCL 110MG/55ML
0.5 PATIENT CONTROLLED ANALGESIA SYRINGE INTRAVENOUS
Status: DISCONTINUED | OUTPATIENT
Start: 2021-07-22 | End: 2021-07-22 | Stop reason: HOSPADM

## 2021-07-22 RX ADMIN — Medication 0.6 MG: at 13:38

## 2021-07-22 RX ADMIN — ONDANSETRON 4 MG: 2 INJECTION INTRAMUSCULAR; INTRAVENOUS at 13:35

## 2021-07-22 RX ADMIN — PROPOFOL 140 MG: 10 INJECTION, EMULSION INTRAVENOUS at 13:27

## 2021-07-22 RX ADMIN — DEXAMETHASONE SODIUM PHOSPHATE 4 MG: 4 INJECTION, SOLUTION INTRAMUSCULAR; INTRAVENOUS at 13:35

## 2021-07-22 RX ADMIN — Medication 3 MG: at 13:38

## 2021-07-22 RX ADMIN — Medication 100 MCG: at 13:42

## 2021-07-22 RX ADMIN — SODIUM CHLORIDE: 0.9 INJECTION, SOLUTION INTRAVENOUS at 13:24

## 2021-07-22 RX ADMIN — FENTANYL CITRATE 25 MCG: 50 INJECTION, SOLUTION INTRAMUSCULAR; INTRAVENOUS at 13:25

## 2021-07-22 RX ADMIN — SUGAMMADEX 200 MG: 100 INJECTION, SOLUTION INTRAVENOUS at 13:48

## 2021-07-22 RX ADMIN — HEPARIN SODIUM 5000 UNITS: 5000 INJECTION INTRAVENOUS; SUBCUTANEOUS at 21:37

## 2021-07-22 RX ADMIN — ROCURONIUM BROMIDE 20 MG: 10 INJECTION INTRAVENOUS at 13:27

## 2021-07-22 RX ADMIN — Medication 10 ML: at 20:00

## 2021-07-22 NOTE — ANESTHESIA POSTPROCEDURE EVALUATION
Patient: Camilla Marcos    Procedure Summary     Date: 07/22/21 Room / Location: [facility] ENDOSCOPY 4 / [facility] ENDOSCOPY    Anesthesia Start: 1324 Anesthesia Stop: 1351    Procedure: ESOPHAGOGASTRODUodenoscopy (N/A ) Diagnosis:       Esophageal dysphagia      (Esophageal dysphagia [R13.10])    Surgeons: Lily Bettencourt MD Provider: Jean-Claude Hernandez MD    Anesthesia Type: general ASA Status: 3          Anesthesia Type: general    Vitals  Vitals Value Taken Time   /58 07/22/21 1444   Temp     Pulse 68 07/22/21 1445   Resp 20 07/22/21 1444   SpO2 97 % 07/22/21 1445   Vitals shown include unvalidated device data.        Post Anesthesia Care and Evaluation    Patient location during evaluation: PACU  Patient participation: complete - patient participated  Level of consciousness: awake  Pain scale: See nurse's notes for pain score.  Pain management: adequate  Airway patency: patent  Anesthetic complications: No anesthetic complications  PONV Status: none  Cardiovascular status: acceptable  Respiratory status: acceptable  Hydration status: acceptable    Comments: Patient seen and examined postoperatively; vital signs stable; SpO2 greater than or equal to 90%; cardiopulmonary status stable; nausea/vomiting adequately controlled; pain adequately controlled; no apparent anesthesia complications; patient discharged from anesthesia care when discharge criteria were met

## 2021-07-22 NOTE — ANESTHESIA PROCEDURE NOTES
Airway  Urgency: elective    Date/Time: 7/22/2021 1:28 PM  Airway not difficult    General Information and Staff    Patient location during procedure: OR  Anesthesiologist: Jean-Claude Hernandez MD    Indications and Patient Condition  Indications for airway management: airway protection    Preoxygenated: yes  MILS maintained throughout  Mask difficulty assessment: 0 - not attempted    Final Airway Details  Final airway type: endotracheal airway      Successful airway: ETT  Cuffed: yes   Successful intubation technique: direct laryngoscopy and RSI  Facilitating devices/methods: cricoid pressure  Endotracheal tube insertion site: oral  Blade: Alexis  Blade size: 3  ETT size (mm): 7.0  Cormack-Lehane Classification: grade IIa - partial view of glottis  Placement verified by: chest auscultation and capnometry   Measured from: lips  ETT/EBT  to lips (cm): 20  Number of attempts at approach: 1  Assessment: lips, teeth, and gum same as pre-op and atraumatic intubation

## 2021-07-22 NOTE — ANESTHESIA PREPROCEDURE EVALUATION
Anesthesia Evaluation     Patient summary reviewed and Nursing notes reviewed   no history of anesthetic complications:  NPO Solid Status: > 6 hours  NPO Liquid Status: > 6 hours           Airway   Mallampati: II  TM distance: >3 FB  Neck ROM: full  No difficulty expected  Dental - normal exam     Pulmonary - negative pulmonary ROS and normal exam    breath sounds clear to auscultation  Cardiovascular - normal exam    ECG reviewed  Rhythm: regular  Rate: normal    (+) hypertension, dysrhythmias, hyperlipidemia,       Neuro/Psych  (+) psychiatric history,     GI/Hepatic/Renal/Endo    (+)  GERD,  diabetes mellitus type 2, thyroid problem hypothyroidism    Musculoskeletal (-) negative ROS    Abdominal  - normal exam    Abdomen: soft.  Bowel sounds: normal.   Substance History - negative use     OB/GYN negative ob/gyn ROS         Other      history of cancer                      Anesthesia Plan    ASA 3     general     intravenous induction     Anesthetic plan, all risks, benefits, and alternatives have been provided, discussed and informed consent has been obtained with: patient.  Use of blood products discussed with patient .

## 2021-07-23 ENCOUNTER — ANESTHESIA EVENT (OUTPATIENT)
Dept: PERIOP | Facility: HOSPITAL | Age: 76
End: 2021-07-23

## 2021-07-23 ENCOUNTER — ANESTHESIA (OUTPATIENT)
Dept: PERIOP | Facility: HOSPITAL | Age: 76
End: 2021-07-23

## 2021-07-23 ENCOUNTER — APPOINTMENT (OUTPATIENT)
Dept: GENERAL RADIOLOGY | Facility: HOSPITAL | Age: 76
End: 2021-07-23

## 2021-07-23 LAB
ANION GAP SERPL CALCULATED.3IONS-SCNC: 11 MMOL/L (ref 5–15)
BUN SERPL-MCNC: 14 MG/DL (ref 8–23)
BUN/CREAT SERPL: 22.2 (ref 7–25)
CALCIUM SPEC-SCNC: 9.5 MG/DL (ref 8.6–10.5)
CHLORIDE SERPL-SCNC: 105 MMOL/L (ref 98–107)
CO2 SERPL-SCNC: 24 MMOL/L (ref 22–29)
CREAT SERPL-MCNC: 0.63 MG/DL (ref 0.57–1)
DEPRECATED RDW RBC AUTO: 40.6 FL (ref 37–54)
ERYTHROCYTE [DISTWIDTH] IN BLOOD BY AUTOMATED COUNT: 12.8 % (ref 12.3–15.4)
GFR SERPL CREATININE-BSD FRML MDRD: 92 ML/MIN/1.73
GLUCOSE BLDC GLUCOMTR-MCNC: 145 MG/DL (ref 70–130)
GLUCOSE BLDC GLUCOMTR-MCNC: 229 MG/DL (ref 70–130)
GLUCOSE BLDC GLUCOMTR-MCNC: 92 MG/DL (ref 70–130)
GLUCOSE SERPL-MCNC: 102 MG/DL (ref 65–99)
HBA1C MFR BLD: 6.7 % (ref 4.8–5.6)
HCT VFR BLD AUTO: 36.1 % (ref 34–46.6)
HGB BLD-MCNC: 11.8 G/DL (ref 12–15.9)
INR PPP: 1.03 (ref 0.9–1.1)
MCH RBC QN AUTO: 28.4 PG (ref 26.6–33)
MCHC RBC AUTO-ENTMCNC: 32.7 G/DL (ref 31.5–35.7)
MCV RBC AUTO: 86.8 FL (ref 79–97)
PLATELET # BLD AUTO: 359 10*3/MM3 (ref 140–450)
PMV BLD AUTO: 9.9 FL (ref 6–12)
POTASSIUM SERPL-SCNC: 3.6 MMOL/L (ref 3.5–5.2)
PROTHROMBIN TIME: 13.3 SECONDS (ref 11.7–14.2)
RBC # BLD AUTO: 4.16 10*6/MM3 (ref 3.77–5.28)
SODIUM SERPL-SCNC: 140 MMOL/L (ref 136–145)
WBC # BLD AUTO: 6.77 10*3/MM3 (ref 3.4–10.8)

## 2021-07-23 PROCEDURE — 25010000003 CEFAZOLIN IN DEXTROSE 2-4 GM/100ML-% SOLUTION: Performed by: NURSE PRACTITIONER

## 2021-07-23 PROCEDURE — 76000 FLUOROSCOPY <1 HR PHYS/QHP: CPT

## 2021-07-23 PROCEDURE — 82962 GLUCOSE BLOOD TEST: CPT

## 2021-07-23 PROCEDURE — 25010000002 NEOSTIGMINE 5 MG/10ML SOLUTION: Performed by: NURSE ANESTHETIST, CERTIFIED REGISTERED

## 2021-07-23 PROCEDURE — 85027 COMPLETE CBC AUTOMATED: CPT | Performed by: NURSE PRACTITIONER

## 2021-07-23 PROCEDURE — 43266 EGD ENDOSCOPIC STENT PLACE: CPT | Performed by: THORACIC SURGERY (CARDIOTHORACIC VASCULAR SURGERY)

## 2021-07-23 PROCEDURE — 25010000002 SUCCINYLCHOLINE PER 20 MG: Performed by: NURSE ANESTHETIST, CERTIFIED REGISTERED

## 2021-07-23 PROCEDURE — 85610 PROTHROMBIN TIME: CPT | Performed by: NURSE PRACTITIONER

## 2021-07-23 PROCEDURE — 25010000002 DEXAMETHASONE PER 1 MG: Performed by: NURSE ANESTHETIST, CERTIFIED REGISTERED

## 2021-07-23 PROCEDURE — C1876 STENT, NON-COA/NON-COV W/DEL: HCPCS | Performed by: THORACIC SURGERY (CARDIOTHORACIC VASCULAR SURGERY)

## 2021-07-23 PROCEDURE — 25010000002 PROPOFOL 10 MG/ML EMULSION: Performed by: NURSE ANESTHETIST, CERTIFIED REGISTERED

## 2021-07-23 PROCEDURE — 25010000002 CEFTRIAXONE PER 250 MG: Performed by: THORACIC SURGERY (CARDIOTHORACIC VASCULAR SURGERY)

## 2021-07-23 PROCEDURE — C1713 ANCHOR/SCREW BN/BN,TIS/BN: HCPCS | Performed by: THORACIC SURGERY (CARDIOTHORACIC VASCULAR SURGERY)

## 2021-07-23 PROCEDURE — 25010000002 ONDANSETRON PER 1 MG: Performed by: NURSE ANESTHETIST, CERTIFIED REGISTERED

## 2021-07-23 PROCEDURE — 80048 BASIC METABOLIC PNL TOTAL CA: CPT | Performed by: NURSE PRACTITIONER

## 2021-07-23 PROCEDURE — 83036 HEMOGLOBIN GLYCOSYLATED A1C: CPT | Performed by: NURSE PRACTITIONER

## 2021-07-23 PROCEDURE — 25010000002 HYDROMORPHONE PER 4 MG: Performed by: THORACIC SURGERY (CARDIOTHORACIC VASCULAR SURGERY)

## 2021-07-23 PROCEDURE — 25010000002 SODIUM CHLORIDE 0.9 % WITH KCL 20 MEQ 20-0.9 MEQ/L-% SOLUTION: Performed by: THORACIC SURGERY (CARDIOTHORACIC VASCULAR SURGERY)

## 2021-07-23 PROCEDURE — 25010000002 FENTANYL CITRATE (PF) 50 MCG/ML SOLUTION: Performed by: NURSE ANESTHETIST, CERTIFIED REGISTERED

## 2021-07-23 PROCEDURE — 71045 X-RAY EXAM CHEST 1 VIEW: CPT

## 2021-07-23 DEVICE — STNT ESOPH EVOLUTION DS FULL CVR 18X23MM 10CM: Type: IMPLANTABLE DEVICE | Site: ESOPHAGUS | Status: FUNCTIONAL

## 2021-07-23 RX ORDER — CEFTRIAXONE SODIUM 1 G/50ML
1 INJECTION, SOLUTION INTRAVENOUS EVERY 24 HOURS
Status: DISCONTINUED | OUTPATIENT
Start: 2021-07-23 | End: 2021-08-02 | Stop reason: HOSPADM

## 2021-07-23 RX ORDER — INSULIN LISPRO 100 [IU]/ML
0-9 INJECTION, SOLUTION INTRAVENOUS; SUBCUTANEOUS
Status: DISCONTINUED | OUTPATIENT
Start: 2021-07-23 | End: 2021-08-02 | Stop reason: HOSPADM

## 2021-07-23 RX ORDER — SODIUM CHLORIDE, SODIUM LACTATE, POTASSIUM CHLORIDE, CALCIUM CHLORIDE 600; 310; 30; 20 MG/100ML; MG/100ML; MG/100ML; MG/100ML
9 INJECTION, SOLUTION INTRAVENOUS CONTINUOUS
Status: DISCONTINUED | OUTPATIENT
Start: 2021-07-23 | End: 2021-07-25

## 2021-07-23 RX ORDER — PROMETHAZINE HYDROCHLORIDE 25 MG/1
25 SUPPOSITORY RECTAL ONCE AS NEEDED
Status: COMPLETED | OUTPATIENT
Start: 2021-07-23 | End: 2021-07-28

## 2021-07-23 RX ORDER — NEOSTIGMINE METHYLSULFATE 0.5 MG/ML
INJECTION, SOLUTION INTRAVENOUS AS NEEDED
Status: DISCONTINUED | OUTPATIENT
Start: 2021-07-23 | End: 2021-07-23 | Stop reason: SURG

## 2021-07-23 RX ORDER — FENTANYL CITRATE 50 UG/ML
INJECTION, SOLUTION INTRAMUSCULAR; INTRAVENOUS AS NEEDED
Status: DISCONTINUED | OUTPATIENT
Start: 2021-07-23 | End: 2021-07-23 | Stop reason: SURG

## 2021-07-23 RX ORDER — NALOXONE HCL 0.4 MG/ML
0.2 VIAL (ML) INJECTION AS NEEDED
Status: DISCONTINUED | OUTPATIENT
Start: 2021-07-23 | End: 2021-08-02 | Stop reason: HOSPADM

## 2021-07-23 RX ORDER — DEXTROSE MONOHYDRATE 25 G/50ML
25 INJECTION, SOLUTION INTRAVENOUS
Status: DISCONTINUED | OUTPATIENT
Start: 2021-07-23 | End: 2021-08-02 | Stop reason: HOSPADM

## 2021-07-23 RX ORDER — SODIUM CHLORIDE 0.9 % (FLUSH) 0.9 %
3-10 SYRINGE (ML) INJECTION AS NEEDED
Status: DISCONTINUED | OUTPATIENT
Start: 2021-07-23 | End: 2021-07-23 | Stop reason: HOSPADM

## 2021-07-23 RX ORDER — HYDRALAZINE HYDROCHLORIDE 20 MG/ML
5 INJECTION INTRAMUSCULAR; INTRAVENOUS
Status: DISCONTINUED | OUTPATIENT
Start: 2021-07-23 | End: 2021-08-02 | Stop reason: HOSPADM

## 2021-07-23 RX ORDER — SODIUM CHLORIDE 9 MG/ML
100 INJECTION, SOLUTION INTRAVENOUS CONTINUOUS
Status: DISCONTINUED | OUTPATIENT
Start: 2021-07-23 | End: 2021-07-25

## 2021-07-23 RX ORDER — LABETALOL HYDROCHLORIDE 5 MG/ML
5 INJECTION, SOLUTION INTRAVENOUS
Status: DISCONTINUED | OUTPATIENT
Start: 2021-07-23 | End: 2021-08-02 | Stop reason: HOSPADM

## 2021-07-23 RX ORDER — PANTOPRAZOLE SODIUM 40 MG/1
40 TABLET, DELAYED RELEASE ORAL EVERY MORNING
Refills: 3 | Status: DISCONTINUED | OUTPATIENT
Start: 2021-07-23 | End: 2021-07-23

## 2021-07-23 RX ORDER — FLUMAZENIL 0.1 MG/ML
0.2 INJECTION INTRAVENOUS AS NEEDED
Status: DISCONTINUED | OUTPATIENT
Start: 2021-07-23 | End: 2021-08-02 | Stop reason: HOSPADM

## 2021-07-23 RX ORDER — EPHEDRINE SULFATE 50 MG/ML
5 INJECTION, SOLUTION INTRAVENOUS ONCE AS NEEDED
Status: DISCONTINUED | OUTPATIENT
Start: 2021-07-23 | End: 2021-08-02 | Stop reason: HOSPADM

## 2021-07-23 RX ORDER — DEXAMETHASONE SODIUM PHOSPHATE 10 MG/ML
INJECTION INTRAMUSCULAR; INTRAVENOUS AS NEEDED
Status: DISCONTINUED | OUTPATIENT
Start: 2021-07-23 | End: 2021-07-23 | Stop reason: SURG

## 2021-07-23 RX ORDER — SODIUM CHLORIDE AND POTASSIUM CHLORIDE 150; 900 MG/100ML; MG/100ML
100 INJECTION, SOLUTION INTRAVENOUS CONTINUOUS
Status: DISCONTINUED | OUTPATIENT
Start: 2021-07-23 | End: 2021-07-25

## 2021-07-23 RX ORDER — POTASSIUM CHLORIDE 750 MG/1
20 TABLET, FILM COATED, EXTENDED RELEASE ORAL DAILY
Status: DISCONTINUED | OUTPATIENT
Start: 2021-07-23 | End: 2021-07-26

## 2021-07-23 RX ORDER — FENTANYL CITRATE 50 UG/ML
50 INJECTION, SOLUTION INTRAMUSCULAR; INTRAVENOUS
Status: DISCONTINUED | OUTPATIENT
Start: 2021-07-23 | End: 2021-07-23 | Stop reason: HOSPADM

## 2021-07-23 RX ORDER — HYDROMORPHONE HYDROCHLORIDE 1 MG/ML
0.5 INJECTION, SOLUTION INTRAMUSCULAR; INTRAVENOUS; SUBCUTANEOUS
Status: DISCONTINUED | OUTPATIENT
Start: 2021-07-23 | End: 2021-07-26

## 2021-07-23 RX ORDER — LIDOCAINE HYDROCHLORIDE 20 MG/ML
INJECTION, SOLUTION INFILTRATION; PERINEURAL AS NEEDED
Status: DISCONTINUED | OUTPATIENT
Start: 2021-07-23 | End: 2021-07-23 | Stop reason: SURG

## 2021-07-23 RX ORDER — SODIUM CHLORIDE 9 MG/ML
75 INJECTION, SOLUTION INTRAVENOUS ONCE
Status: DISCONTINUED | OUTPATIENT
Start: 2021-07-24 | End: 2021-07-25

## 2021-07-23 RX ORDER — SODIUM CHLORIDE 0.9 % (FLUSH) 0.9 %
3 SYRINGE (ML) INJECTION EVERY 12 HOURS SCHEDULED
Status: DISCONTINUED | OUTPATIENT
Start: 2021-07-23 | End: 2021-07-23 | Stop reason: HOSPADM

## 2021-07-23 RX ORDER — GLYCOPYRROLATE 0.2 MG/ML
INJECTION INTRAMUSCULAR; INTRAVENOUS AS NEEDED
Status: DISCONTINUED | OUTPATIENT
Start: 2021-07-23 | End: 2021-07-23 | Stop reason: SURG

## 2021-07-23 RX ORDER — FUROSEMIDE 40 MG/1
40 TABLET ORAL DAILY
Status: DISCONTINUED | OUTPATIENT
Start: 2021-07-23 | End: 2021-07-23

## 2021-07-23 RX ORDER — CEFAZOLIN SODIUM 2 G/100ML
2 INJECTION, SOLUTION INTRAVENOUS ONCE
Status: COMPLETED | OUTPATIENT
Start: 2021-07-23 | End: 2021-07-23

## 2021-07-23 RX ORDER — FAMOTIDINE 10 MG/ML
20 INJECTION, SOLUTION INTRAVENOUS ONCE
Status: COMPLETED | OUTPATIENT
Start: 2021-07-23 | End: 2021-07-23

## 2021-07-23 RX ORDER — ONDANSETRON 2 MG/ML
4 INJECTION INTRAMUSCULAR; INTRAVENOUS ONCE AS NEEDED
Status: COMPLETED | OUTPATIENT
Start: 2021-07-23 | End: 2021-07-26

## 2021-07-23 RX ORDER — MIDAZOLAM HYDROCHLORIDE 1 MG/ML
0.5 INJECTION INTRAMUSCULAR; INTRAVENOUS
Status: DISCONTINUED | OUTPATIENT
Start: 2021-07-23 | End: 2021-07-23 | Stop reason: HOSPADM

## 2021-07-23 RX ORDER — ROCURONIUM BROMIDE 10 MG/ML
INJECTION, SOLUTION INTRAVENOUS AS NEEDED
Status: DISCONTINUED | OUTPATIENT
Start: 2021-07-23 | End: 2021-07-23 | Stop reason: SURG

## 2021-07-23 RX ORDER — DIPHENHYDRAMINE HYDROCHLORIDE 50 MG/ML
12.5 INJECTION INTRAMUSCULAR; INTRAVENOUS
Status: DISCONTINUED | OUTPATIENT
Start: 2021-07-23 | End: 2021-08-02 | Stop reason: HOSPADM

## 2021-07-23 RX ORDER — PROPOFOL 10 MG/ML
VIAL (ML) INTRAVENOUS AS NEEDED
Status: DISCONTINUED | OUTPATIENT
Start: 2021-07-23 | End: 2021-07-23 | Stop reason: SURG

## 2021-07-23 RX ORDER — METOPROLOL SUCCINATE 100 MG/1
100 TABLET, EXTENDED RELEASE ORAL DAILY
Status: DISCONTINUED | OUTPATIENT
Start: 2021-07-23 | End: 2021-07-23

## 2021-07-23 RX ORDER — FENTANYL CITRATE 50 UG/ML
50 INJECTION, SOLUTION INTRAMUSCULAR; INTRAVENOUS
Status: DISCONTINUED | OUTPATIENT
Start: 2021-07-23 | End: 2021-08-02 | Stop reason: HOSPADM

## 2021-07-23 RX ORDER — PROMETHAZINE HYDROCHLORIDE 25 MG/1
25 TABLET ORAL ONCE AS NEEDED
Status: COMPLETED | OUTPATIENT
Start: 2021-07-23 | End: 2021-07-28

## 2021-07-23 RX ORDER — DIPHENHYDRAMINE HCL 25 MG
25 CAPSULE ORAL
Status: DISCONTINUED | OUTPATIENT
Start: 2021-07-23 | End: 2021-08-02 | Stop reason: HOSPADM

## 2021-07-23 RX ORDER — OXYCODONE AND ACETAMINOPHEN 10; 325 MG/1; MG/1
1 TABLET ORAL EVERY 4 HOURS PRN
Status: DISCONTINUED | OUTPATIENT
Start: 2021-07-23 | End: 2021-07-26

## 2021-07-23 RX ORDER — LEVOTHYROXINE SODIUM 0.03 MG/1
25 TABLET ORAL
Status: DISCONTINUED | OUTPATIENT
Start: 2021-07-23 | End: 2021-07-23

## 2021-07-23 RX ORDER — ONDANSETRON 2 MG/ML
INJECTION INTRAMUSCULAR; INTRAVENOUS AS NEEDED
Status: DISCONTINUED | OUTPATIENT
Start: 2021-07-23 | End: 2021-07-23 | Stop reason: SURG

## 2021-07-23 RX ORDER — EPHEDRINE SULFATE 50 MG/ML
INJECTION, SOLUTION INTRAVENOUS AS NEEDED
Status: DISCONTINUED | OUTPATIENT
Start: 2021-07-23 | End: 2021-07-23 | Stop reason: SURG

## 2021-07-23 RX ORDER — IBUPROFEN 600 MG/1
600 TABLET ORAL ONCE AS NEEDED
Status: DISCONTINUED | OUTPATIENT
Start: 2021-07-23 | End: 2021-07-26

## 2021-07-23 RX ORDER — SUCCINYLCHOLINE CHLORIDE 20 MG/ML
INJECTION INTRAMUSCULAR; INTRAVENOUS AS NEEDED
Status: DISCONTINUED | OUTPATIENT
Start: 2021-07-23 | End: 2021-07-23 | Stop reason: SURG

## 2021-07-23 RX ORDER — HYDROCODONE BITARTRATE AND ACETAMINOPHEN 7.5; 325 MG/1; MG/1
1 TABLET ORAL ONCE AS NEEDED
Status: DISCONTINUED | OUTPATIENT
Start: 2021-07-23 | End: 2021-07-26

## 2021-07-23 RX ORDER — NICOTINE POLACRILEX 4 MG
15 LOZENGE BUCCAL
Status: DISCONTINUED | OUTPATIENT
Start: 2021-07-23 | End: 2021-08-02 | Stop reason: HOSPADM

## 2021-07-23 RX ORDER — LIDOCAINE HYDROCHLORIDE 10 MG/ML
0.5 INJECTION, SOLUTION EPIDURAL; INFILTRATION; INTRACAUDAL; PERINEURAL ONCE AS NEEDED
Status: DISCONTINUED | OUTPATIENT
Start: 2021-07-23 | End: 2021-07-23 | Stop reason: HOSPADM

## 2021-07-23 RX ADMIN — ROCURONIUM BROMIDE 20 MG: 50 INJECTION INTRAVENOUS at 14:26

## 2021-07-23 RX ADMIN — PANTOPRAZOLE SODIUM 40 MG: 40 TABLET, DELAYED RELEASE ORAL at 05:54

## 2021-07-23 RX ADMIN — FENTANYL CITRATE 50 MCG: 50 INJECTION INTRAMUSCULAR; INTRAVENOUS at 14:44

## 2021-07-23 RX ADMIN — METOPROLOL SUCCINATE 100 MG: 100 TABLET, EXTENDED RELEASE ORAL at 09:48

## 2021-07-23 RX ADMIN — ONDANSETRON 4 MG: 2 INJECTION INTRAMUSCULAR; INTRAVENOUS at 15:02

## 2021-07-23 RX ADMIN — SODIUM CHLORIDE 100 ML/HR: 9 INJECTION, SOLUTION INTRAVENOUS at 05:55

## 2021-07-23 RX ADMIN — GLYCOPYRROLATE 0.2 MG: 0.2 INJECTION INTRAMUSCULAR; INTRAVENOUS at 15:25

## 2021-07-23 RX ADMIN — EPHEDRINE SULFATE 10 MG: 50 INJECTION INTRAVENOUS at 15:13

## 2021-07-23 RX ADMIN — CEFTRIAXONE SODIUM 1 G: 1 INJECTION, SOLUTION INTRAVENOUS at 17:47

## 2021-07-23 RX ADMIN — PROPOFOL 100 MG: 10 INJECTION, EMULSION INTRAVENOUS at 14:08

## 2021-07-23 RX ADMIN — FENTANYL CITRATE 50 MCG: 50 INJECTION INTRAMUSCULAR; INTRAVENOUS at 14:09

## 2021-07-23 RX ADMIN — METRONIDAZOLE 500 MG: 500 INJECTION, SOLUTION INTRAVENOUS at 20:51

## 2021-07-23 RX ADMIN — LABETALOL HYDROCHLORIDE 5 MG: 5 INJECTION, SOLUTION INTRAVENOUS at 17:14

## 2021-07-23 RX ADMIN — FAMOTIDINE 20 MG: 10 INJECTION INTRAVENOUS at 13:29

## 2021-07-23 RX ADMIN — CEFAZOLIN SODIUM 2 G: 2 INJECTION, SOLUTION INTRAVENOUS at 13:52

## 2021-07-23 RX ADMIN — METOPROLOL TARTRATE 5 MG: 1 INJECTION, SOLUTION INTRAVENOUS at 17:30

## 2021-07-23 RX ADMIN — SUCCINYLCHOLINE CHLORIDE 100 MG: 20 INJECTION, SOLUTION INTRAMUSCULAR; INTRAVENOUS; PARENTERAL at 14:08

## 2021-07-23 RX ADMIN — LEVOTHYROXINE SODIUM 25 MCG: 0.03 TABLET ORAL at 05:54

## 2021-07-23 RX ADMIN — POTASSIUM CHLORIDE AND SODIUM CHLORIDE 100 ML/HR: 900; 150 INJECTION, SOLUTION INTRAVENOUS at 17:47

## 2021-07-23 RX ADMIN — FENTANYL CITRATE 50 MCG: 50 INJECTION INTRAMUSCULAR; INTRAVENOUS at 15:31

## 2021-07-23 RX ADMIN — LIDOCAINE HYDROCHLORIDE 60 MG: 20 INJECTION, SOLUTION INFILTRATION; PERINEURAL at 14:08

## 2021-07-23 RX ADMIN — FENTANYL CITRATE 50 MCG: 50 INJECTION INTRAMUSCULAR; INTRAVENOUS at 14:26

## 2021-07-23 RX ADMIN — NEOSTIGMINE METHYLSULFATE 2 MG: 0.5 INJECTION INTRAVENOUS at 15:25

## 2021-07-23 RX ADMIN — HYDROMORPHONE HYDROCHLORIDE 0.5 MG: 1 INJECTION, SOLUTION INTRAMUSCULAR; INTRAVENOUS; SUBCUTANEOUS at 17:47

## 2021-07-23 RX ADMIN — EPHEDRINE SULFATE 10 MG: 50 INJECTION INTRAVENOUS at 15:15

## 2021-07-23 RX ADMIN — SODIUM CHLORIDE, POTASSIUM CHLORIDE, SODIUM LACTATE AND CALCIUM CHLORIDE 9 ML/HR: 600; 310; 30; 20 INJECTION, SOLUTION INTRAVENOUS at 13:15

## 2021-07-23 RX ADMIN — DEXAMETHASONE SODIUM PHOSPHATE 4 MG: 10 INJECTION INTRAMUSCULAR; INTRAVENOUS at 14:24

## 2021-07-23 NOTE — ANESTHESIA PROCEDURE NOTES
Airway  Urgency: elective    Date/Time: 7/23/2021 2:08 PM  Airway not difficult    General Information and Staff    Patient location during procedure: OR  Anesthesiologist: Ej Stack MD  CRNA: Katie Roth CRNA    Indications and Patient Condition  Indications for airway management: airway protection    Preoxygenated: yes  MILS maintained throughout  Mask difficulty assessment: 0 - not attempted    Final Airway Details  Final airway type: endotracheal airway      Successful airway: ETT  Cuffed: yes   Successful intubation technique: direct laryngoscopy and RSI  Endotracheal tube insertion site: oral  Blade: Giles  Blade size: 2  ETT size (mm): 7.0  Cormack-Lehane Classification: grade I - full view of glottis  Placement verified by: chest auscultation, bronchoscopy and capnometry   Measured from: teeth  ETT/EBT  to teeth (cm): 22  Number of attempts at approach: 1  Assessment: lips, teeth, and gum same as pre-op and atraumatic intubation

## 2021-07-23 NOTE — ANESTHESIA PREPROCEDURE EVALUATION
Anesthesia Evaluation     NPO Solid Status: > 8 hours             Airway   Mallampati: I  TM distance: >3 FB  Neck ROM: full  Dental - normal exam     Pulmonary - normal exam   Cardiovascular - normal exam    (+) hypertension, dysrhythmias Atrial Fib, hyperlipidemia,       Neuro/Psych  (+) psychiatric history Depression,     GI/Hepatic/Renal/Endo    (+)  GERD,  diabetes mellitus,     Musculoskeletal     Abdominal    Substance History      OB/GYN          Other      history of cancer                    Anesthesia Plan    ASA 3     general   Rapid sequence  intravenous induction     Anesthetic plan, all risks, benefits, and alternatives have been provided, discussed and informed consent has been obtained with: patient.

## 2021-07-23 NOTE — ANESTHESIA POSTPROCEDURE EVALUATION
"Patient: Camilla Marcos    Procedure Summary     Date: 07/23/21 Room / Location: [facility] OR [address] [place] [address] MAIN OR    Anesthesia Start: 1356 Anesthesia Stop: 1546    Procedure: ESOPHAGOGASTRODUODENOSCOPY with stent placement (N/A Esophagus) Diagnosis:       Esophageal dysphagia      (Esophageal dysphagia [R13.10])    Surgeons: Lily Bettencourt MD Provider: Ej Stack MD    Anesthesia Type: general ASA Status: 3          Anesthesia Type: general    Vitals  Vitals Value Taken Time   /93 07/23/21 1630   Temp     Pulse 74 07/23/21 1635   Resp 12 07/23/21 1540   SpO2 100 % 07/23/21 1635   Vitals shown include unvalidated device data.        Post Anesthesia Care and Evaluation    Patient participation: complete - patient cannot participate  Anesthetic complications: No anesthetic complications    Cardiovascular status: acceptable  Respiratory status: acceptable    Comments: Patient record reviewed. Patient stable and discharged prior to being evaluated. No apparent anesthetic complications.  THIS CASE IS NOT MEDICALLY DIRECTED.  BP (!) 189/93 Comment: Pt refuses medication to lower BP  Pulse 76   Temp 36.7 °C (98.1 °F) (Oral)   Resp 12   Ht 165.1 cm (65\")   Wt 56.4 kg (124 lb 6.4 oz)   SpO2 100%   BMI 20.70 kg/m²       "

## 2021-07-24 LAB
GLUCOSE BLDC GLUCOMTR-MCNC: 102 MG/DL (ref 70–130)
GLUCOSE BLDC GLUCOMTR-MCNC: 113 MG/DL (ref 70–130)
GLUCOSE BLDC GLUCOMTR-MCNC: 91 MG/DL (ref 70–130)
GLUCOSE BLDC GLUCOMTR-MCNC: 95 MG/DL (ref 70–130)
GLUCOSE BLDC GLUCOMTR-MCNC: 99 MG/DL (ref 70–130)

## 2021-07-24 PROCEDURE — 25010000002 CEFTRIAXONE PER 250 MG: Performed by: THORACIC SURGERY (CARDIOTHORACIC VASCULAR SURGERY)

## 2021-07-24 PROCEDURE — 82962 GLUCOSE BLOOD TEST: CPT

## 2021-07-24 PROCEDURE — 99221 1ST HOSP IP/OBS SF/LOW 40: CPT | Performed by: SURGERY

## 2021-07-24 PROCEDURE — 99024 POSTOP FOLLOW-UP VISIT: CPT | Performed by: NURSE PRACTITIONER

## 2021-07-24 RX ORDER — FAMOTIDINE 10 MG/ML
20 INJECTION, SOLUTION INTRAVENOUS DAILY
Status: DISCONTINUED | OUTPATIENT
Start: 2021-07-24 | End: 2021-07-27

## 2021-07-24 RX ADMIN — METRONIDAZOLE 500 MG: 500 INJECTION, SOLUTION INTRAVENOUS at 05:06

## 2021-07-24 RX ADMIN — METRONIDAZOLE 500 MG: 500 INJECTION, SOLUTION INTRAVENOUS at 20:21

## 2021-07-24 RX ADMIN — CEFTRIAXONE SODIUM 1 G: 1 INJECTION, SOLUTION INTRAVENOUS at 16:57

## 2021-07-24 RX ADMIN — METOPROLOL TARTRATE 5 MG: 1 INJECTION, SOLUTION INTRAVENOUS at 05:12

## 2021-07-24 RX ADMIN — FAMOTIDINE 20 MG: 10 INJECTION INTRAVENOUS at 20:31

## 2021-07-24 RX ADMIN — SODIUM CHLORIDE 100 ML/HR: 9 INJECTION, SOLUTION INTRAVENOUS at 05:06

## 2021-07-24 RX ADMIN — METOPROLOL TARTRATE 5 MG: 1 INJECTION, SOLUTION INTRAVENOUS at 16:57

## 2021-07-24 RX ADMIN — SODIUM CHLORIDE 100 ML/HR: 9 INJECTION, SOLUTION INTRAVENOUS at 20:21

## 2021-07-24 RX ADMIN — METOPROLOL TARTRATE 5 MG: 1 INJECTION, SOLUTION INTRAVENOUS at 12:22

## 2021-07-24 RX ADMIN — METRONIDAZOLE 500 MG: 500 INJECTION, SOLUTION INTRAVENOUS at 12:22

## 2021-07-25 LAB
ANION GAP SERPL CALCULATED.3IONS-SCNC: 14.6 MMOL/L (ref 5–15)
BUN SERPL-MCNC: 9 MG/DL (ref 8–23)
BUN/CREAT SERPL: 15.8 (ref 7–25)
CALCIUM SPEC-SCNC: 8.8 MG/DL (ref 8.6–10.5)
CHLORIDE SERPL-SCNC: 105 MMOL/L (ref 98–107)
CO2 SERPL-SCNC: 22.4 MMOL/L (ref 22–29)
CREAT SERPL-MCNC: 0.57 MG/DL (ref 0.57–1)
DEPRECATED RDW RBC AUTO: 40.8 FL (ref 37–54)
ERYTHROCYTE [DISTWIDTH] IN BLOOD BY AUTOMATED COUNT: 12.8 % (ref 12.3–15.4)
GFR SERPL CREATININE-BSD FRML MDRD: 103 ML/MIN/1.73
GLUCOSE BLDC GLUCOMTR-MCNC: 181 MG/DL (ref 70–130)
GLUCOSE BLDC GLUCOMTR-MCNC: 69 MG/DL (ref 70–130)
GLUCOSE BLDC GLUCOMTR-MCNC: 81 MG/DL (ref 70–130)
GLUCOSE BLDC GLUCOMTR-MCNC: 83 MG/DL (ref 70–130)
GLUCOSE BLDC GLUCOMTR-MCNC: 92 MG/DL (ref 70–130)
GLUCOSE SERPL-MCNC: 87 MG/DL (ref 65–99)
HCT VFR BLD AUTO: 35.5 % (ref 34–46.6)
HGB BLD-MCNC: 11.4 G/DL (ref 12–15.9)
MAGNESIUM SERPL-MCNC: 1.3 MG/DL (ref 1.6–2.4)
MCH RBC QN AUTO: 28.1 PG (ref 26.6–33)
MCHC RBC AUTO-ENTMCNC: 32.1 G/DL (ref 31.5–35.7)
MCV RBC AUTO: 87.4 FL (ref 79–97)
PLATELET # BLD AUTO: 328 10*3/MM3 (ref 140–450)
PMV BLD AUTO: 10.1 FL (ref 6–12)
POTASSIUM SERPL-SCNC: 3.1 MMOL/L (ref 3.5–5.2)
RBC # BLD AUTO: 4.06 10*6/MM3 (ref 3.77–5.28)
SARS-COV-2 RNA RESP QL NAA+PROBE: NOT DETECTED
SODIUM SERPL-SCNC: 142 MMOL/L (ref 136–145)
WBC # BLD AUTO: 7.84 10*3/MM3 (ref 3.4–10.8)

## 2021-07-25 PROCEDURE — U0005 INFEC AGEN DETEC AMPLI PROBE: HCPCS | Performed by: THORACIC SURGERY (CARDIOTHORACIC VASCULAR SURGERY)

## 2021-07-25 PROCEDURE — 80048 BASIC METABOLIC PNL TOTAL CA: CPT | Performed by: NURSE PRACTITIONER

## 2021-07-25 PROCEDURE — 25010000003 POTASSIUM CHLORIDE 10 MEQ/100ML SOLUTION: Performed by: NURSE PRACTITIONER

## 2021-07-25 PROCEDURE — 25010000002 CEFTRIAXONE PER 250 MG: Performed by: THORACIC SURGERY (CARDIOTHORACIC VASCULAR SURGERY)

## 2021-07-25 PROCEDURE — 82962 GLUCOSE BLOOD TEST: CPT

## 2021-07-25 PROCEDURE — 25010000002 MAGNESIUM SULFATE 2 GM/50ML SOLUTION: Performed by: NURSE PRACTITIONER

## 2021-07-25 PROCEDURE — 99232 SBSQ HOSP IP/OBS MODERATE 35: CPT | Performed by: NURSE PRACTITIONER

## 2021-07-25 PROCEDURE — 85027 COMPLETE CBC AUTOMATED: CPT | Performed by: NURSE PRACTITIONER

## 2021-07-25 PROCEDURE — 83735 ASSAY OF MAGNESIUM: CPT | Performed by: NURSE PRACTITIONER

## 2021-07-25 PROCEDURE — 25810000003 SODIUM CHLORIDE 0.9 % WITH KCL 40 MEQ/L 40-0.9 MEQ/L-% SOLUTION: Performed by: NURSE PRACTITIONER

## 2021-07-25 PROCEDURE — U0003 INFECTIOUS AGENT DETECTION BY NUCLEIC ACID (DNA OR RNA); SEVERE ACUTE RESPIRATORY SYNDROME CORONAVIRUS 2 (SARS-COV-2) (CORONAVIRUS DISEASE [COVID-19]), AMPLIFIED PROBE TECHNIQUE, MAKING USE OF HIGH THROUGHPUT TECHNOLOGIES AS DESCRIBED BY CMS-2020-01-R: HCPCS | Performed by: THORACIC SURGERY (CARDIOTHORACIC VASCULAR SURGERY)

## 2021-07-25 PROCEDURE — 99231 SBSQ HOSP IP/OBS SF/LOW 25: CPT | Performed by: SURGERY

## 2021-07-25 RX ORDER — MAGNESIUM SULFATE HEPTAHYDRATE 40 MG/ML
2 INJECTION, SOLUTION INTRAVENOUS AS NEEDED
Status: DISCONTINUED | OUTPATIENT
Start: 2021-07-25 | End: 2021-08-02 | Stop reason: HOSPADM

## 2021-07-25 RX ORDER — SODIUM CHLORIDE AND POTASSIUM CHLORIDE 300; 900 MG/100ML; MG/100ML
75 INJECTION, SOLUTION INTRAVENOUS CONTINUOUS
Status: DISCONTINUED | OUTPATIENT
Start: 2021-07-25 | End: 2021-07-27

## 2021-07-25 RX ORDER — MAGNESIUM SULFATE HEPTAHYDRATE 40 MG/ML
4 INJECTION, SOLUTION INTRAVENOUS AS NEEDED
Status: DISCONTINUED | OUTPATIENT
Start: 2021-07-25 | End: 2021-08-02 | Stop reason: HOSPADM

## 2021-07-25 RX ORDER — POTASSIUM CHLORIDE 7.45 MG/ML
10 INJECTION INTRAVENOUS
Status: COMPLETED | OUTPATIENT
Start: 2021-07-25 | End: 2021-07-25

## 2021-07-25 RX ADMIN — POTASSIUM CHLORIDE AND SODIUM CHLORIDE 100 ML/HR: 900; 300 INJECTION, SOLUTION INTRAVENOUS at 12:34

## 2021-07-25 RX ADMIN — METOPROLOL TARTRATE 5 MG: 1 INJECTION, SOLUTION INTRAVENOUS at 10:33

## 2021-07-25 RX ADMIN — DEXTROSE MONOHYDRATE 25 G: 25 INJECTION, SOLUTION INTRAVENOUS at 16:36

## 2021-07-25 RX ADMIN — METOPROLOL TARTRATE 5 MG: 1 INJECTION, SOLUTION INTRAVENOUS at 06:21

## 2021-07-25 RX ADMIN — METRONIDAZOLE 500 MG: 500 INJECTION, SOLUTION INTRAVENOUS at 06:19

## 2021-07-25 RX ADMIN — MAGNESIUM SULFATE HEPTAHYDRATE 2 G: 2 INJECTION, SOLUTION INTRAVENOUS at 22:57

## 2021-07-25 RX ADMIN — METOPROLOL TARTRATE 5 MG: 1 INJECTION, SOLUTION INTRAVENOUS at 17:18

## 2021-07-25 RX ADMIN — POTASSIUM CHLORIDE 10 MEQ: 7.46 INJECTION, SOLUTION INTRAVENOUS at 11:19

## 2021-07-25 RX ADMIN — METRONIDAZOLE 500 MG: 500 INJECTION, SOLUTION INTRAVENOUS at 14:08

## 2021-07-25 RX ADMIN — FAMOTIDINE 20 MG: 10 INJECTION INTRAVENOUS at 10:33

## 2021-07-25 RX ADMIN — CEFTRIAXONE SODIUM 1 G: 1 INJECTION, SOLUTION INTRAVENOUS at 17:18

## 2021-07-25 RX ADMIN — POTASSIUM CHLORIDE 10 MEQ: 7.46 INJECTION, SOLUTION INTRAVENOUS at 12:25

## 2021-07-25 RX ADMIN — METRONIDAZOLE 500 MG: 500 INJECTION, SOLUTION INTRAVENOUS at 20:24

## 2021-07-26 ENCOUNTER — ANESTHESIA (OUTPATIENT)
Dept: PERIOP | Facility: HOSPITAL | Age: 76
End: 2021-07-26

## 2021-07-26 ENCOUNTER — ANESTHESIA EVENT (OUTPATIENT)
Dept: PERIOP | Facility: HOSPITAL | Age: 76
End: 2021-07-26

## 2021-07-26 LAB
ANION GAP SERPL CALCULATED.3IONS-SCNC: 14.9 MMOL/L (ref 5–15)
ANION GAP SERPL CALCULATED.3IONS-SCNC: NORMAL MMOL/L
BUN SERPL-MCNC: 8 MG/DL (ref 8–23)
BUN SERPL-MCNC: NORMAL MG/DL
BUN/CREAT SERPL: 12.7 (ref 7–25)
BUN/CREAT SERPL: NORMAL
CALCIUM SPEC-SCNC: 8.6 MG/DL (ref 8.6–10.5)
CALCIUM SPEC-SCNC: NORMAL MMOL/L
CHLORIDE SERPL-SCNC: 103 MMOL/L (ref 98–107)
CHLORIDE SERPL-SCNC: NORMAL MMOL/L
CO2 SERPL-SCNC: 20.1 MMOL/L (ref 22–29)
CO2 SERPL-SCNC: NORMAL MMOL/L
CREAT SERPL-MCNC: 0.63 MG/DL (ref 0.57–1)
CREAT SERPL-MCNC: NORMAL MG/DL
GFR SERPL CREATININE-BSD FRML MDRD: 92 ML/MIN/1.73
GFR SERPL CREATININE-BSD FRML MDRD: NORMAL ML/MIN/{1.73_M2}
GLUCOSE BLDC GLUCOMTR-MCNC: 100 MG/DL (ref 70–130)
GLUCOSE BLDC GLUCOMTR-MCNC: 119 MG/DL (ref 70–130)
GLUCOSE BLDC GLUCOMTR-MCNC: 146 MG/DL (ref 70–130)
GLUCOSE BLDC GLUCOMTR-MCNC: 68 MG/DL (ref 70–130)
GLUCOSE BLDC GLUCOMTR-MCNC: 82 MG/DL (ref 70–130)
GLUCOSE BLDC GLUCOMTR-MCNC: 83 MG/DL (ref 70–130)
GLUCOSE SERPL-MCNC: 81 MG/DL (ref 65–99)
GLUCOSE SERPL-MCNC: NORMAL MG/DL
MAGNESIUM SERPL-MCNC: 3.6 MG/DL (ref 1.6–2.4)
POTASSIUM SERPL-SCNC: 3.7 MMOL/L (ref 3.5–5.2)
POTASSIUM SERPL-SCNC: NORMAL MMOL/L
SODIUM SERPL-SCNC: 138 MMOL/L (ref 136–145)
SODIUM SERPL-SCNC: NORMAL MMOL/L

## 2021-07-26 PROCEDURE — 25010000002 ONDANSETRON PER 1 MG: Performed by: ANESTHESIOLOGY

## 2021-07-26 PROCEDURE — 80048 BASIC METABOLIC PNL TOTAL CA: CPT | Performed by: INTERNAL MEDICINE

## 2021-07-26 PROCEDURE — 25010000002 PROPOFOL 10 MG/ML EMULSION: Performed by: NURSE ANESTHETIST, CERTIFIED REGISTERED

## 2021-07-26 PROCEDURE — 82962 GLUCOSE BLOOD TEST: CPT

## 2021-07-26 PROCEDURE — 44300 OPEN BOWEL TO SKIN: CPT | Performed by: SURGERY

## 2021-07-26 PROCEDURE — 0DN80ZZ RELEASE SMALL INTESTINE, OPEN APPROACH: ICD-10-PCS | Performed by: SURGERY

## 2021-07-26 PROCEDURE — 25010000002 MAGNESIUM SULFATE 2 GM/50ML SOLUTION: Performed by: NURSE PRACTITIONER

## 2021-07-26 PROCEDURE — 80048 BASIC METABOLIC PNL TOTAL CA: CPT | Performed by: NURSE PRACTITIONER

## 2021-07-26 PROCEDURE — 25010000002 FENTANYL CITRATE (PF) 50 MCG/ML SOLUTION: Performed by: THORACIC SURGERY (CARDIOTHORACIC VASCULAR SURGERY)

## 2021-07-26 PROCEDURE — 25010000002 NEOSTIGMINE 5 MG/10ML SOLUTION: Performed by: ANESTHESIOLOGY

## 2021-07-26 PROCEDURE — 25010000002 ONDANSETRON PER 1 MG: Performed by: SURGERY

## 2021-07-26 PROCEDURE — 99231 SBSQ HOSP IP/OBS SF/LOW 25: CPT | Performed by: NURSE PRACTITIONER

## 2021-07-26 PROCEDURE — 25010000002 CEFTRIAXONE PER 250 MG: Performed by: SURGERY

## 2021-07-26 PROCEDURE — 83735 ASSAY OF MAGNESIUM: CPT | Performed by: NURSE PRACTITIONER

## 2021-07-26 PROCEDURE — 25010000002 FENTANYL CITRATE (PF) 50 MCG/ML SOLUTION: Performed by: ANESTHESIOLOGY

## 2021-07-26 PROCEDURE — C1729 CATH, DRAINAGE: HCPCS | Performed by: SURGERY

## 2021-07-26 PROCEDURE — 0DHA0UZ INSERTION OF FEEDING DEVICE INTO JEJUNUM, OPEN APPROACH: ICD-10-PCS | Performed by: SURGERY

## 2021-07-26 PROCEDURE — 0DU68JZ SUPPLEMENT STOMACH WITH SYNTHETIC SUBSTITUTE, VIA NATURAL OR ARTIFICIAL OPENING ENDOSCOPIC: ICD-10-PCS | Performed by: SURGERY

## 2021-07-26 RX ORDER — MAGNESIUM HYDROXIDE 1200 MG/15ML
LIQUID ORAL AS NEEDED
Status: DISCONTINUED | OUTPATIENT
Start: 2021-07-26 | End: 2021-07-26 | Stop reason: HOSPADM

## 2021-07-26 RX ORDER — POTASSIUM CHLORIDE 1.5 G/1.77G
40 POWDER, FOR SOLUTION ORAL AS NEEDED
Status: DISCONTINUED | OUTPATIENT
Start: 2021-07-26 | End: 2021-07-26

## 2021-07-26 RX ORDER — HYDROMORPHONE HYDROCHLORIDE 1 MG/ML
0.25 INJECTION, SOLUTION INTRAMUSCULAR; INTRAVENOUS; SUBCUTANEOUS
Status: DISCONTINUED | OUTPATIENT
Start: 2021-07-26 | End: 2021-07-26

## 2021-07-26 RX ORDER — SODIUM CHLORIDE 0.9 % (FLUSH) 0.9 %
10 SYRINGE (ML) INJECTION EVERY 12 HOURS SCHEDULED
Status: DISCONTINUED | OUTPATIENT
Start: 2021-07-26 | End: 2021-07-26 | Stop reason: HOSPADM

## 2021-07-26 RX ORDER — OXYCODONE HYDROCHLORIDE AND ACETAMINOPHEN 5; 325 MG/1; MG/1
1 TABLET ORAL EVERY 4 HOURS PRN
Status: DISCONTINUED | OUTPATIENT
Start: 2021-07-26 | End: 2021-08-02 | Stop reason: HOSPADM

## 2021-07-26 RX ORDER — FENTANYL CITRATE 50 UG/ML
50 INJECTION, SOLUTION INTRAMUSCULAR; INTRAVENOUS
Status: COMPLETED | OUTPATIENT
Start: 2021-07-26 | End: 2021-07-26

## 2021-07-26 RX ORDER — HYDROMORPHONE HYDROCHLORIDE 1 MG/ML
0.5 INJECTION, SOLUTION INTRAMUSCULAR; INTRAVENOUS; SUBCUTANEOUS
Status: DISPENSED | OUTPATIENT
Start: 2021-07-26 | End: 2021-07-30

## 2021-07-26 RX ORDER — ONDANSETRON 2 MG/ML
4 INJECTION INTRAMUSCULAR; INTRAVENOUS ONCE AS NEEDED
Status: COMPLETED | OUTPATIENT
Start: 2021-07-26 | End: 2021-07-26

## 2021-07-26 RX ORDER — LIDOCAINE HYDROCHLORIDE 20 MG/ML
INJECTION, SOLUTION INFILTRATION; PERINEURAL AS NEEDED
Status: DISCONTINUED | OUTPATIENT
Start: 2021-07-26 | End: 2021-07-26 | Stop reason: SURG

## 2021-07-26 RX ORDER — SODIUM CHLORIDE 0.9 % (FLUSH) 0.9 %
10 SYRINGE (ML) INJECTION AS NEEDED
Status: DISCONTINUED | OUTPATIENT
Start: 2021-07-26 | End: 2021-07-26 | Stop reason: HOSPADM

## 2021-07-26 RX ORDER — POTASSIUM CHLORIDE 750 MG/1
40 TABLET, FILM COATED, EXTENDED RELEASE ORAL AS NEEDED
Status: DISCONTINUED | OUTPATIENT
Start: 2021-07-26 | End: 2021-07-26

## 2021-07-26 RX ORDER — MIDAZOLAM HYDROCHLORIDE 1 MG/ML
0.5 INJECTION INTRAMUSCULAR; INTRAVENOUS
Status: DISCONTINUED | OUTPATIENT
Start: 2021-07-26 | End: 2021-07-26 | Stop reason: HOSPADM

## 2021-07-26 RX ORDER — HYDROMORPHONE HYDROCHLORIDE 1 MG/ML
0.25 INJECTION, SOLUTION INTRAMUSCULAR; INTRAVENOUS; SUBCUTANEOUS
Status: DISCONTINUED | OUTPATIENT
Start: 2021-07-26 | End: 2021-07-26 | Stop reason: HOSPADM

## 2021-07-26 RX ORDER — PROPOFOL 10 MG/ML
VIAL (ML) INTRAVENOUS AS NEEDED
Status: DISCONTINUED | OUTPATIENT
Start: 2021-07-26 | End: 2021-07-26 | Stop reason: SURG

## 2021-07-26 RX ORDER — POTASSIUM CHLORIDE 7.45 MG/ML
10 INJECTION INTRAVENOUS
Status: DISCONTINUED | OUTPATIENT
Start: 2021-07-26 | End: 2021-07-26

## 2021-07-26 RX ORDER — ROCURONIUM BROMIDE 10 MG/ML
INJECTION, SOLUTION INTRAVENOUS AS NEEDED
Status: DISCONTINUED | OUTPATIENT
Start: 2021-07-26 | End: 2021-07-26 | Stop reason: SURG

## 2021-07-26 RX ORDER — IBUPROFEN 400 MG/1
400 TABLET ORAL ONCE AS NEEDED
Status: DISCONTINUED | OUTPATIENT
Start: 2021-07-26 | End: 2021-08-02 | Stop reason: HOSPADM

## 2021-07-26 RX ORDER — GLYCOPYRROLATE 0.2 MG/ML
INJECTION INTRAMUSCULAR; INTRAVENOUS AS NEEDED
Status: DISCONTINUED | OUTPATIENT
Start: 2021-07-26 | End: 2021-07-26 | Stop reason: SURG

## 2021-07-26 RX ORDER — SODIUM CHLORIDE, SODIUM LACTATE, POTASSIUM CHLORIDE, CALCIUM CHLORIDE 600; 310; 30; 20 MG/100ML; MG/100ML; MG/100ML; MG/100ML
9 INJECTION, SOLUTION INTRAVENOUS CONTINUOUS
Status: DISCONTINUED | OUTPATIENT
Start: 2021-07-26 | End: 2021-07-26

## 2021-07-26 RX ORDER — BUPIVACAINE HYDROCHLORIDE AND EPINEPHRINE 5; 5 MG/ML; UG/ML
INJECTION, SOLUTION PERINEURAL AS NEEDED
Status: DISCONTINUED | OUTPATIENT
Start: 2021-07-26 | End: 2021-07-26 | Stop reason: HOSPADM

## 2021-07-26 RX ORDER — NEOSTIGMINE METHYLSULFATE 0.5 MG/ML
INJECTION, SOLUTION INTRAVENOUS AS NEEDED
Status: DISCONTINUED | OUTPATIENT
Start: 2021-07-26 | End: 2021-07-26 | Stop reason: SURG

## 2021-07-26 RX ADMIN — FENTANYL CITRATE 50 MCG: 50 INJECTION, SOLUTION INTRAMUSCULAR; INTRAVENOUS at 19:22

## 2021-07-26 RX ADMIN — CEFTRIAXONE SODIUM 1 G: 1 INJECTION, SOLUTION INTRAVENOUS at 20:25

## 2021-07-26 RX ADMIN — ONDANSETRON 4 MG: 2 INJECTION INTRAMUSCULAR; INTRAVENOUS at 18:33

## 2021-07-26 RX ADMIN — FAMOTIDINE 20 MG: 10 INJECTION INTRAVENOUS at 10:17

## 2021-07-26 RX ADMIN — METOPROLOL TARTRATE 5 MG: 1 INJECTION, SOLUTION INTRAVENOUS at 20:25

## 2021-07-26 RX ADMIN — METRONIDAZOLE 500 MG: 500 INJECTION, SOLUTION INTRAVENOUS at 21:56

## 2021-07-26 RX ADMIN — METOPROLOL TARTRATE 5 MG: 1 INJECTION, SOLUTION INTRAVENOUS at 05:18

## 2021-07-26 RX ADMIN — METRONIDAZOLE 500 MG: 500 INJECTION, SOLUTION INTRAVENOUS at 05:17

## 2021-07-26 RX ADMIN — GLYCOPYRROLATE 0.2 MG: 0.2 INJECTION INTRAMUSCULAR; INTRAVENOUS at 18:18

## 2021-07-26 RX ADMIN — DEXTROSE MONOHYDRATE 25 G: 25 INJECTION, SOLUTION INTRAVENOUS at 16:01

## 2021-07-26 RX ADMIN — LIDOCAINE HYDROCHLORIDE 60 MG: 20 INJECTION, SOLUTION INFILTRATION; PERINEURAL at 17:23

## 2021-07-26 RX ADMIN — FENTANYL CITRATE 50 MCG: 50 INJECTION INTRAMUSCULAR; INTRAVENOUS at 17:52

## 2021-07-26 RX ADMIN — ROCURONIUM BROMIDE 30 MG: 50 INJECTION INTRAVENOUS at 17:23

## 2021-07-26 RX ADMIN — FENTANYL CITRATE 50 MCG: 50 INJECTION, SOLUTION INTRAMUSCULAR; INTRAVENOUS at 18:49

## 2021-07-26 RX ADMIN — MAGNESIUM SULFATE HEPTAHYDRATE 2 G: 2 INJECTION, SOLUTION INTRAVENOUS at 01:04

## 2021-07-26 RX ADMIN — NEOSTIGMINE METHYLSULFATE 3 MG: 0.5 INJECTION INTRAVENOUS at 18:10

## 2021-07-26 RX ADMIN — GLYCOPYRROLATE 0.2 MG: 0.2 INJECTION INTRAMUSCULAR; INTRAVENOUS at 18:10

## 2021-07-26 RX ADMIN — METOPROLOL TARTRATE 5 MG: 1 INJECTION, SOLUTION INTRAVENOUS at 23:53

## 2021-07-26 RX ADMIN — FENTANYL CITRATE 50 MCG: 50 INJECTION, SOLUTION INTRAMUSCULAR; INTRAVENOUS at 19:46

## 2021-07-26 RX ADMIN — PROPOFOL 100 MG: 10 INJECTION, EMULSION INTRAVENOUS at 17:23

## 2021-07-26 RX ADMIN — ONDANSETRON 4 MG: 2 INJECTION INTRAMUSCULAR; INTRAVENOUS at 20:11

## 2021-07-26 RX ADMIN — FENTANYL CITRATE 50 MCG: 50 INJECTION, SOLUTION INTRAMUSCULAR; INTRAVENOUS at 18:41

## 2021-07-26 RX ADMIN — METOPROLOL TARTRATE 5 MG: 1 INJECTION, SOLUTION INTRAVENOUS at 12:02

## 2021-07-26 RX ADMIN — MAGNESIUM SULFATE HEPTAHYDRATE 2 G: 2 INJECTION, SOLUTION INTRAVENOUS at 05:18

## 2021-07-26 RX ADMIN — FENTANYL CITRATE 50 MCG: 50 INJECTION INTRAMUSCULAR; INTRAVENOUS at 17:41

## 2021-07-26 RX ADMIN — SODIUM CHLORIDE, POTASSIUM CHLORIDE, SODIUM LACTATE AND CALCIUM CHLORIDE: 600; 310; 30; 20 INJECTION, SOLUTION INTRAVENOUS at 16:30

## 2021-07-26 RX ADMIN — METRONIDAZOLE 500 MG: 500 INJECTION, SOLUTION INTRAVENOUS at 12:02

## 2021-07-26 RX ADMIN — METOPROLOL TARTRATE 5 MG: 1 INJECTION, SOLUTION INTRAVENOUS at 01:04

## 2021-07-26 NOTE — ANESTHESIA PROCEDURE NOTES
Airway  Urgency: elective    Date/Time: 7/26/2021 5:24 PM  Airway not difficult    General Information and Staff    Patient location during procedure: OR  Anesthesiologist: Ej Stack MD  CRNA: Liang Mcdowell CRNA    Indications and Patient Condition  Indications for airway management: airway protection    Preoxygenated: yes  MILS maintained throughout  Mask difficulty assessment: 1 - vent by mask    Final Airway Details  Final airway type: endotracheal airway      Successful airway: ETT  Cuffed: yes   Successful intubation technique: direct laryngoscopy  Endotracheal tube insertion site: oral  Blade: Alexis  Blade size: 3  ETT size (mm): 7.0  Cormack-Lehane Classification: grade I - full view of glottis  Placement verified by: chest auscultation and capnometry   Measured from: lips  ETT/EBT  to lips (cm): 20  Number of attempts at approach: 1

## 2021-07-26 NOTE — ANESTHESIA PREPROCEDURE EVALUATION
Anesthesia Evaluation     Patient summary reviewed and Nursing notes reviewed   history of anesthetic complications: prolonged sedation               Airway   Mallampati: II  TM distance: >3 FB  Neck ROM: full  No difficulty expected  Dental      Pulmonary - negative pulmonary ROS   Cardiovascular     ECG reviewed  PT is on anticoagulation therapy  Patient on routine beta blocker and Beta blocker given within 24 hours of surgery  Rhythm: regular  Rate: normal    (+) hypertension, dysrhythmias Paroxysmal Atrial Fib, hyperlipidemia,       Neuro/Psych  (+) psychiatric history Anxiety and Depression,     GI/Hepatic/Renal/Endo    (+)  GERD,  diabetes mellitus type 2,     Musculoskeletal (-) negative ROS    Abdominal    Substance History - negative use     OB/GYN negative ob/gyn ROS         Other      history of cancer                    Anesthesia Plan    ASA 3     general   (I have reviewed the patient's history with the patient and the chart, including all pertinent laboratory results and imaging. I have explained the risks of anesthesia including but not limited to dental damage, corneal abrasion, nerve injury, MI, stroke, and death. Questions asked and answered. Anesthetic plan discussed with patient and team as indicated. Patient expressed understanding of the above.  )  intravenous induction     Anesthetic plan, all risks, benefits, and alternatives have been provided, discussed and informed consent has been obtained with: patient.

## 2021-07-27 LAB
ANION GAP SERPL CALCULATED.3IONS-SCNC: 16.7 MMOL/L (ref 5–15)
BUN SERPL-MCNC: 11 MG/DL (ref 8–23)
BUN/CREAT SERPL: 18 (ref 7–25)
CALCIUM SPEC-SCNC: 8.8 MG/DL (ref 8.6–10.5)
CHLORIDE SERPL-SCNC: 104 MMOL/L (ref 98–107)
CO2 SERPL-SCNC: 17.3 MMOL/L (ref 22–29)
CREAT SERPL-MCNC: 0.61 MG/DL (ref 0.57–1)
DEPRECATED RDW RBC AUTO: 41.8 FL (ref 37–54)
ERYTHROCYTE [DISTWIDTH] IN BLOOD BY AUTOMATED COUNT: 12.8 % (ref 12.3–15.4)
GFR SERPL CREATININE-BSD FRML MDRD: 96 ML/MIN/1.73
GLUCOSE BLDC GLUCOMTR-MCNC: 134 MG/DL (ref 70–130)
GLUCOSE BLDC GLUCOMTR-MCNC: 149 MG/DL (ref 70–130)
GLUCOSE BLDC GLUCOMTR-MCNC: 152 MG/DL (ref 70–130)
GLUCOSE SERPL-MCNC: 155 MG/DL (ref 65–99)
HCT VFR BLD AUTO: 42.4 % (ref 34–46.6)
HGB BLD-MCNC: 13.5 G/DL (ref 12–15.9)
MCH RBC QN AUTO: 28.2 PG (ref 26.6–33)
MCHC RBC AUTO-ENTMCNC: 31.8 G/DL (ref 31.5–35.7)
MCV RBC AUTO: 88.5 FL (ref 79–97)
PLATELET # BLD AUTO: 360 10*3/MM3 (ref 140–450)
PMV BLD AUTO: 10 FL (ref 6–12)
POTASSIUM SERPL-SCNC: 4.8 MMOL/L (ref 3.5–5.2)
RBC # BLD AUTO: 4.79 10*6/MM3 (ref 3.77–5.28)
SODIUM SERPL-SCNC: 138 MMOL/L (ref 136–145)
WBC # BLD AUTO: 19.39 10*3/MM3 (ref 3.4–10.8)

## 2021-07-27 PROCEDURE — 99231 SBSQ HOSP IP/OBS SF/LOW 25: CPT | Performed by: NURSE PRACTITIONER

## 2021-07-27 PROCEDURE — 82962 GLUCOSE BLOOD TEST: CPT

## 2021-07-27 PROCEDURE — 85027 COMPLETE CBC AUTOMATED: CPT | Performed by: SURGERY

## 2021-07-27 PROCEDURE — 25010000002 HYDROMORPHONE PER 4 MG: Performed by: SURGERY

## 2021-07-27 PROCEDURE — 25010000002 CEFTRIAXONE PER 250 MG: Performed by: SURGERY

## 2021-07-27 PROCEDURE — 80048 BASIC METABOLIC PNL TOTAL CA: CPT | Performed by: SURGERY

## 2021-07-27 RX ORDER — METOPROLOL TARTRATE 50 MG/1
50 TABLET, FILM COATED ORAL EVERY 12 HOURS SCHEDULED
Status: DISCONTINUED | OUTPATIENT
Start: 2021-07-27 | End: 2021-08-02 | Stop reason: HOSPADM

## 2021-07-27 RX ORDER — LEVOTHYROXINE SODIUM 0.03 MG/1
25 TABLET ORAL
Status: DISCONTINUED | OUTPATIENT
Start: 2021-07-27 | End: 2021-08-02 | Stop reason: HOSPADM

## 2021-07-27 RX ORDER — LANSOPRAZOLE
30 KIT EVERY MORNING
Status: DISCONTINUED | OUTPATIENT
Start: 2021-07-27 | End: 2021-07-31 | Stop reason: SDUPTHER

## 2021-07-27 RX ORDER — LIDOCAINE 50 MG/G
1 PATCH TOPICAL
Status: DISCONTINUED | OUTPATIENT
Start: 2021-07-27 | End: 2021-08-02 | Stop reason: HOSPADM

## 2021-07-27 RX ORDER — FAMOTIDINE 20 MG/1
20 TABLET, FILM COATED ORAL DAILY
Status: DISCONTINUED | OUTPATIENT
Start: 2021-07-28 | End: 2021-07-27

## 2021-07-27 RX ORDER — ACETAMINOPHEN 160 MG/5ML
650 SOLUTION ORAL EVERY 6 HOURS PRN
Status: DISCONTINUED | OUTPATIENT
Start: 2021-07-27 | End: 2021-08-02 | Stop reason: HOSPADM

## 2021-07-27 RX ADMIN — METRONIDAZOLE 500 MG: 500 INJECTION, SOLUTION INTRAVENOUS at 21:14

## 2021-07-27 RX ADMIN — ACETAMINOPHEN ORAL SOLUTION 650 MG: 325 SOLUTION ORAL at 16:26

## 2021-07-27 RX ADMIN — HYDROMORPHONE HYDROCHLORIDE 0.5 MG: 1 INJECTION, SOLUTION INTRAMUSCULAR; INTRAVENOUS; SUBCUTANEOUS at 00:21

## 2021-07-27 RX ADMIN — METRONIDAZOLE 500 MG: 500 INJECTION, SOLUTION INTRAVENOUS at 05:46

## 2021-07-27 RX ADMIN — CEFTRIAXONE SODIUM 1 G: 1 INJECTION, SOLUTION INTRAVENOUS at 17:52

## 2021-07-27 RX ADMIN — METOPROLOL TARTRATE 50 MG: 50 TABLET, FILM COATED ORAL at 21:14

## 2021-07-27 RX ADMIN — ACETAMINOPHEN ORAL SOLUTION 650 MG: 325 SOLUTION ORAL at 10:14

## 2021-07-27 RX ADMIN — METOPROLOL TARTRATE 50 MG: 50 TABLET, FILM COATED ORAL at 13:47

## 2021-07-27 RX ADMIN — LEVOTHYROXINE SODIUM 25 MCG: 0.03 TABLET ORAL at 13:47

## 2021-07-27 RX ADMIN — LANSOPRAZOLE 30 MG: KIT at 13:56

## 2021-07-27 RX ADMIN — METOPROLOL TARTRATE 5 MG: 1 INJECTION, SOLUTION INTRAVENOUS at 05:46

## 2021-07-27 RX ADMIN — LIDOCAINE 1 PATCH: 50 PATCH TOPICAL at 13:47

## 2021-07-27 RX ADMIN — METRONIDAZOLE 500 MG: 500 INJECTION, SOLUTION INTRAVENOUS at 15:08

## 2021-07-27 NOTE — ANESTHESIA POSTPROCEDURE EVALUATION
"Patient: Camilla Marcos    Procedure Summary     Date: 07/26/21 Room / Location: [facility] OR  / [facility] MAIN OR    Anesthesia Start: 1715 Anesthesia Stop: 1843    Procedure: OPEN JEJUNOSTOMY TUBE (N/A Abdomen) Diagnosis:       Esophageal dysphagia      (Esophageal dysphagia [R13.10])    Surgeons: Bulmaro Coreas Jr., MD Provider: Ej Stack MD    Anesthesia Type: general ASA Status: 3          Anesthesia Type: general    Vitals  Vitals Value Taken Time   /63 07/26/21 1950   Temp 36.4 °C (97.6 °F) 07/26/21 1837   Pulse 79 07/26/21 1957   Resp 16 07/26/21 1950   SpO2 95 % 07/26/21 1957   Vitals shown include unvalidated device data.        Post Anesthesia Care and Evaluation    Patient participation: complete - patient cannot participate  Anesthetic complications: No anesthetic complications    Cardiovascular status: acceptable  Respiratory status: acceptable  Hydration status: acceptable    Comments: Patient was discharged prior to being evaluated by Anesthesia...per chart review, no anesthetic complications were noted.  NOT MEDICALLY DIRECTED  /73 (BP Location: Left arm, Patient Position: Lying)   Pulse 74   Temp 36.4 °C (97.6 °F) (Oral)   Resp 16   Ht 165.1 cm (65\")   Wt 56.4 kg (124 lb 6.4 oz)   SpO2 96%   BMI 20.70 kg/m²         "

## 2021-07-28 ENCOUNTER — APPOINTMENT (OUTPATIENT)
Dept: CT IMAGING | Facility: HOSPITAL | Age: 76
End: 2021-07-28

## 2021-07-28 ENCOUNTER — APPOINTMENT (OUTPATIENT)
Dept: GENERAL RADIOLOGY | Facility: HOSPITAL | Age: 76
End: 2021-07-28

## 2021-07-28 PROBLEM — I48.0 PAF (PAROXYSMAL ATRIAL FIBRILLATION) (HCC): Status: ACTIVE | Noted: 2021-07-28

## 2021-07-28 LAB
ANION GAP SERPL CALCULATED.3IONS-SCNC: 8.8 MMOL/L (ref 5–15)
BUN SERPL-MCNC: 15 MG/DL (ref 8–23)
BUN/CREAT SERPL: 24.2 (ref 7–25)
CALCIUM SPEC-SCNC: 8.8 MG/DL (ref 8.6–10.5)
CHLORIDE SERPL-SCNC: 107 MMOL/L (ref 98–107)
CO2 SERPL-SCNC: 22.2 MMOL/L (ref 22–29)
CREAT SERPL-MCNC: 0.62 MG/DL (ref 0.57–1)
DEPRECATED RDW RBC AUTO: 41.5 FL (ref 37–54)
ERYTHROCYTE [DISTWIDTH] IN BLOOD BY AUTOMATED COUNT: 13.1 % (ref 12.3–15.4)
GFR SERPL CREATININE-BSD FRML MDRD: 94 ML/MIN/1.73
GLUCOSE BLDC GLUCOMTR-MCNC: 114 MG/DL (ref 70–130)
GLUCOSE BLDC GLUCOMTR-MCNC: 159 MG/DL (ref 70–130)
GLUCOSE BLDC GLUCOMTR-MCNC: 162 MG/DL (ref 70–130)
GLUCOSE BLDC GLUCOMTR-MCNC: 170 MG/DL (ref 70–130)
GLUCOSE SERPL-MCNC: 161 MG/DL (ref 65–99)
HCT VFR BLD AUTO: 39.7 % (ref 34–46.6)
HGB BLD-MCNC: 13.1 G/DL (ref 12–15.9)
MCH RBC QN AUTO: 28.9 PG (ref 26.6–33)
MCHC RBC AUTO-ENTMCNC: 33 G/DL (ref 31.5–35.7)
MCV RBC AUTO: 87.4 FL (ref 79–97)
PLATELET # BLD AUTO: 306 10*3/MM3 (ref 140–450)
PMV BLD AUTO: 10.7 FL (ref 6–12)
POTASSIUM SERPL-SCNC: 4 MMOL/L (ref 3.5–5.2)
QT INTERVAL: 227 MS
RBC # BLD AUTO: 4.54 10*6/MM3 (ref 3.77–5.28)
SODIUM SERPL-SCNC: 138 MMOL/L (ref 136–145)
WBC # BLD AUTO: 11.44 10*3/MM3 (ref 3.4–10.8)

## 2021-07-28 PROCEDURE — 63710000001 INSULIN LISPRO (HUMAN) PER 5 UNITS: Performed by: SURGERY

## 2021-07-28 PROCEDURE — 99024 POSTOP FOLLOW-UP VISIT: CPT | Performed by: SURGERY

## 2021-07-28 PROCEDURE — 93005 ELECTROCARDIOGRAM TRACING: CPT | Performed by: INTERNAL MEDICINE

## 2021-07-28 PROCEDURE — 99222 1ST HOSP IP/OBS MODERATE 55: CPT | Performed by: INTERNAL MEDICINE

## 2021-07-28 PROCEDURE — 80048 BASIC METABOLIC PNL TOTAL CA: CPT | Performed by: INTERNAL MEDICINE

## 2021-07-28 PROCEDURE — 25010000002 CEFTRIAXONE PER 250 MG: Performed by: SURGERY

## 2021-07-28 PROCEDURE — 99232 SBSQ HOSP IP/OBS MODERATE 35: CPT | Performed by: NURSE PRACTITIONER

## 2021-07-28 PROCEDURE — 82962 GLUCOSE BLOOD TEST: CPT

## 2021-07-28 PROCEDURE — 25010000002 DIGOXIN PER 500 MCG: Performed by: INTERNAL MEDICINE

## 2021-07-28 PROCEDURE — 63710000001 PROMETHAZINE PER 25 MG: Performed by: SURGERY

## 2021-07-28 PROCEDURE — 85027 COMPLETE CBC AUTOMATED: CPT | Performed by: INTERNAL MEDICINE

## 2021-07-28 PROCEDURE — 71045 X-RAY EXAM CHEST 1 VIEW: CPT

## 2021-07-28 PROCEDURE — 93010 ELECTROCARDIOGRAM REPORT: CPT | Performed by: INTERNAL MEDICINE

## 2021-07-28 PROCEDURE — 71250 CT THORAX DX C-: CPT

## 2021-07-28 PROCEDURE — 99223 1ST HOSP IP/OBS HIGH 75: CPT | Performed by: INTERNAL MEDICINE

## 2021-07-28 RX ORDER — DIGOXIN 0.25 MG/ML
500 INJECTION INTRAMUSCULAR; INTRAVENOUS ONCE
Status: COMPLETED | OUTPATIENT
Start: 2021-07-28 | End: 2021-07-28

## 2021-07-28 RX ADMIN — METOPROLOL TARTRATE 50 MG: 50 TABLET, FILM COATED ORAL at 20:35

## 2021-07-28 RX ADMIN — INSULIN LISPRO 2 UNITS: 100 INJECTION, SOLUTION INTRAVENOUS; SUBCUTANEOUS at 17:53

## 2021-07-28 RX ADMIN — METRONIDAZOLE 500 MG: 500 INJECTION, SOLUTION INTRAVENOUS at 20:34

## 2021-07-28 RX ADMIN — LIDOCAINE 1 PATCH: 50 PATCH TOPICAL at 09:01

## 2021-07-28 RX ADMIN — DIGOXIN 500 MCG: 0.25 INJECTION INTRAMUSCULAR; INTRAVENOUS at 17:53

## 2021-07-28 RX ADMIN — METOROPROLOL TARTRATE 2.5 MG: 5 INJECTION, SOLUTION INTRAVENOUS at 16:03

## 2021-07-28 RX ADMIN — PROMETHAZINE HYDROCHLORIDE 25 MG: 25 TABLET ORAL at 17:53

## 2021-07-28 RX ADMIN — METRONIDAZOLE 500 MG: 500 INJECTION, SOLUTION INTRAVENOUS at 12:27

## 2021-07-28 RX ADMIN — METRONIDAZOLE 500 MG: 500 INJECTION, SOLUTION INTRAVENOUS at 04:53

## 2021-07-28 RX ADMIN — LEVOTHYROXINE SODIUM 25 MCG: 0.03 TABLET ORAL at 06:03

## 2021-07-28 RX ADMIN — INSULIN LISPRO 2 UNITS: 100 INJECTION, SOLUTION INTRAVENOUS; SUBCUTANEOUS at 09:01

## 2021-07-28 RX ADMIN — METOROPROLOL TARTRATE 2.5 MG: 5 INJECTION, SOLUTION INTRAVENOUS at 14:58

## 2021-07-28 RX ADMIN — METOPROLOL TARTRATE 50 MG: 50 TABLET, FILM COATED ORAL at 09:01

## 2021-07-28 RX ADMIN — CEFTRIAXONE SODIUM 1 G: 1 INJECTION, SOLUTION INTRAVENOUS at 17:54

## 2021-07-28 RX ADMIN — LANSOPRAZOLE 30 MG: KIT at 06:03

## 2021-07-29 ENCOUNTER — APPOINTMENT (OUTPATIENT)
Dept: CT IMAGING | Facility: HOSPITAL | Age: 76
End: 2021-07-29

## 2021-07-29 LAB
ANION GAP SERPL CALCULATED.3IONS-SCNC: 7.2 MMOL/L (ref 5–15)
BUN SERPL-MCNC: 11 MG/DL (ref 8–23)
BUN/CREAT SERPL: 23.9 (ref 7–25)
CALCIUM SPEC-SCNC: 8.7 MG/DL (ref 8.6–10.5)
CHLORIDE SERPL-SCNC: 105 MMOL/L (ref 98–107)
CO2 SERPL-SCNC: 26.8 MMOL/L (ref 22–29)
CREAT SERPL-MCNC: 0.46 MG/DL (ref 0.57–1)
DEPRECATED RDW RBC AUTO: 40.9 FL (ref 37–54)
ERYTHROCYTE [DISTWIDTH] IN BLOOD BY AUTOMATED COUNT: 12.8 % (ref 12.3–15.4)
GFR SERPL CREATININE-BSD FRML MDRD: 132 ML/MIN/1.73
GLUCOSE BLDC GLUCOMTR-MCNC: 113 MG/DL (ref 70–130)
GLUCOSE BLDC GLUCOMTR-MCNC: 142 MG/DL (ref 70–130)
GLUCOSE BLDC GLUCOMTR-MCNC: 151 MG/DL (ref 70–130)
GLUCOSE BLDC GLUCOMTR-MCNC: 168 MG/DL (ref 70–130)
GLUCOSE SERPL-MCNC: 147 MG/DL (ref 65–99)
HCT VFR BLD AUTO: 37.2 % (ref 34–46.6)
HGB BLD-MCNC: 12.6 G/DL (ref 12–15.9)
MCH RBC QN AUTO: 29.4 PG (ref 26.6–33)
MCHC RBC AUTO-ENTMCNC: 33.9 G/DL (ref 31.5–35.7)
MCV RBC AUTO: 86.9 FL (ref 79–97)
PLATELET # BLD AUTO: 339 10*3/MM3 (ref 140–450)
PMV BLD AUTO: 10.4 FL (ref 6–12)
POTASSIUM SERPL-SCNC: 3.9 MMOL/L (ref 3.5–5.2)
RBC # BLD AUTO: 4.28 10*6/MM3 (ref 3.77–5.28)
SODIUM SERPL-SCNC: 139 MMOL/L (ref 136–145)
WBC # BLD AUTO: 7.73 10*3/MM3 (ref 3.4–10.8)

## 2021-07-29 PROCEDURE — C1729 CATH, DRAINAGE: HCPCS

## 2021-07-29 PROCEDURE — 99152 MOD SED SAME PHYS/QHP 5/>YRS: CPT

## 2021-07-29 PROCEDURE — 0W9930Z DRAINAGE OF RIGHT PLEURAL CAVITY WITH DRAINAGE DEVICE, PERCUTANEOUS APPROACH: ICD-10-PCS | Performed by: SURGERY

## 2021-07-29 PROCEDURE — 97162 PT EVAL MOD COMPLEX 30 MIN: CPT

## 2021-07-29 PROCEDURE — 85027 COMPLETE CBC AUTOMATED: CPT | Performed by: INTERNAL MEDICINE

## 2021-07-29 PROCEDURE — 99024 POSTOP FOLLOW-UP VISIT: CPT | Performed by: SURGERY

## 2021-07-29 PROCEDURE — 99232 SBSQ HOSP IP/OBS MODERATE 35: CPT | Performed by: INTERNAL MEDICINE

## 2021-07-29 PROCEDURE — 80048 BASIC METABOLIC PNL TOTAL CA: CPT | Performed by: INTERNAL MEDICINE

## 2021-07-29 PROCEDURE — 25010000002 CEFTRIAXONE PER 250 MG: Performed by: SURGERY

## 2021-07-29 PROCEDURE — 99232 SBSQ HOSP IP/OBS MODERATE 35: CPT | Performed by: NURSE PRACTITIONER

## 2021-07-29 PROCEDURE — 82962 GLUCOSE BLOOD TEST: CPT

## 2021-07-29 PROCEDURE — 25010000002 MIDAZOLAM PER 1 MG: Performed by: RADIOLOGY

## 2021-07-29 PROCEDURE — 75989 ABSCESS DRAINAGE UNDER X-RAY: CPT

## 2021-07-29 PROCEDURE — 25010000002 FENTANYL CITRATE (PF) 50 MCG/ML SOLUTION: Performed by: RADIOLOGY

## 2021-07-29 PROCEDURE — 63710000001 INSULIN LISPRO (HUMAN) PER 5 UNITS: Performed by: SURGERY

## 2021-07-29 PROCEDURE — 97110 THERAPEUTIC EXERCISES: CPT

## 2021-07-29 RX ORDER — FENTANYL CITRATE 50 UG/ML
INJECTION, SOLUTION INTRAMUSCULAR; INTRAVENOUS
Status: COMPLETED | OUTPATIENT
Start: 2021-07-29 | End: 2021-07-29

## 2021-07-29 RX ORDER — LIDOCAINE HYDROCHLORIDE 10 MG/ML
20 INJECTION, SOLUTION INFILTRATION; PERINEURAL ONCE
Status: DISCONTINUED | OUTPATIENT
Start: 2021-07-29 | End: 2021-08-02 | Stop reason: HOSPADM

## 2021-07-29 RX ORDER — MIDAZOLAM HYDROCHLORIDE 1 MG/ML
INJECTION INTRAMUSCULAR; INTRAVENOUS
Status: COMPLETED | OUTPATIENT
Start: 2021-07-29 | End: 2021-07-29

## 2021-07-29 RX ADMIN — LEVOTHYROXINE SODIUM 25 MCG: 0.03 TABLET ORAL at 06:19

## 2021-07-29 RX ADMIN — OXYCODONE HYDROCHLORIDE AND ACETAMINOPHEN 1 TABLET: 5; 325 TABLET ORAL at 18:14

## 2021-07-29 RX ADMIN — ACETAMINOPHEN ORAL SOLUTION 650 MG: 325 SOLUTION ORAL at 22:19

## 2021-07-29 RX ADMIN — METOPROLOL TARTRATE 50 MG: 50 TABLET, FILM COATED ORAL at 08:05

## 2021-07-29 RX ADMIN — METRONIDAZOLE 500 MG: 500 INJECTION, SOLUTION INTRAVENOUS at 17:29

## 2021-07-29 RX ADMIN — INSULIN LISPRO 2 UNITS: 100 INJECTION, SOLUTION INTRAVENOUS; SUBCUTANEOUS at 08:05

## 2021-07-29 RX ADMIN — METOROPROLOL TARTRATE 2.5 MG: 5 INJECTION, SOLUTION INTRAVENOUS at 00:08

## 2021-07-29 RX ADMIN — MIDAZOLAM 1 MG: 1 INJECTION INTRAMUSCULAR; INTRAVENOUS at 14:51

## 2021-07-29 RX ADMIN — LANSOPRAZOLE 30 MG: KIT at 06:19

## 2021-07-29 RX ADMIN — FENTANYL CITRATE 50 MCG: 50 INJECTION INTRAMUSCULAR; INTRAVENOUS at 14:50

## 2021-07-29 RX ADMIN — METRONIDAZOLE 500 MG: 500 INJECTION, SOLUTION INTRAVENOUS at 21:02

## 2021-07-29 RX ADMIN — METRONIDAZOLE 500 MG: 500 INJECTION, SOLUTION INTRAVENOUS at 04:53

## 2021-07-29 RX ADMIN — METOPROLOL TARTRATE 50 MG: 50 TABLET, FILM COATED ORAL at 21:01

## 2021-07-29 RX ADMIN — CEFTRIAXONE SODIUM 1 G: 1 INJECTION, SOLUTION INTRAVENOUS at 18:14

## 2021-07-30 ENCOUNTER — APPOINTMENT (OUTPATIENT)
Dept: GENERAL RADIOLOGY | Facility: HOSPITAL | Age: 76
End: 2021-07-30

## 2021-07-30 LAB
GLUCOSE BLDC GLUCOMTR-MCNC: 150 MG/DL (ref 70–130)
GLUCOSE BLDC GLUCOMTR-MCNC: 190 MG/DL (ref 70–130)
GLUCOSE BLDC GLUCOMTR-MCNC: 229 MG/DL (ref 70–130)

## 2021-07-30 PROCEDURE — 82962 GLUCOSE BLOOD TEST: CPT

## 2021-07-30 PROCEDURE — 71045 X-RAY EXAM CHEST 1 VIEW: CPT

## 2021-07-30 PROCEDURE — 97530 THERAPEUTIC ACTIVITIES: CPT

## 2021-07-30 PROCEDURE — 97116 GAIT TRAINING THERAPY: CPT

## 2021-07-30 PROCEDURE — 63710000001 INSULIN LISPRO (HUMAN) PER 5 UNITS: Performed by: SURGERY

## 2021-07-30 PROCEDURE — 99232 SBSQ HOSP IP/OBS MODERATE 35: CPT | Performed by: NURSE PRACTITIONER

## 2021-07-30 PROCEDURE — 25010000002 ONDANSETRON PER 1 MG: Performed by: NURSE PRACTITIONER

## 2021-07-30 PROCEDURE — 25010000002 CEFTRIAXONE PER 250 MG: Performed by: SURGERY

## 2021-07-30 RX ORDER — ONDANSETRON 2 MG/ML
4 INJECTION INTRAMUSCULAR; INTRAVENOUS EVERY 6 HOURS PRN
Status: DISCONTINUED | OUTPATIENT
Start: 2021-07-30 | End: 2021-08-02 | Stop reason: HOSPADM

## 2021-07-30 RX ADMIN — METRONIDAZOLE 500 MG: 500 INJECTION, SOLUTION INTRAVENOUS at 14:21

## 2021-07-30 RX ADMIN — ACETAMINOPHEN ORAL SOLUTION 650 MG: 325 SOLUTION ORAL at 06:10

## 2021-07-30 RX ADMIN — ONDANSETRON 4 MG: 2 INJECTION INTRAMUSCULAR; INTRAVENOUS at 10:58

## 2021-07-30 RX ADMIN — METOPROLOL TARTRATE 50 MG: 50 TABLET, FILM COATED ORAL at 09:22

## 2021-07-30 RX ADMIN — ONDANSETRON 4 MG: 2 INJECTION INTRAMUSCULAR; INTRAVENOUS at 17:04

## 2021-07-30 RX ADMIN — LEVOTHYROXINE SODIUM 25 MCG: 0.03 TABLET ORAL at 06:10

## 2021-07-30 RX ADMIN — INSULIN LISPRO 2 UNITS: 100 INJECTION, SOLUTION INTRAVENOUS; SUBCUTANEOUS at 17:04

## 2021-07-30 RX ADMIN — CEFTRIAXONE SODIUM 1 G: 1 INJECTION, SOLUTION INTRAVENOUS at 17:04

## 2021-07-30 RX ADMIN — METRONIDAZOLE 500 MG: 500 INJECTION, SOLUTION INTRAVENOUS at 06:10

## 2021-07-30 RX ADMIN — INSULIN LISPRO 2 UNITS: 100 INJECTION, SOLUTION INTRAVENOUS; SUBCUTANEOUS at 09:21

## 2021-07-30 RX ADMIN — LANSOPRAZOLE 30 MG: KIT at 06:10

## 2021-07-30 RX ADMIN — METOPROLOL TARTRATE 50 MG: 50 TABLET, FILM COATED ORAL at 20:00

## 2021-07-30 RX ADMIN — OXYCODONE HYDROCHLORIDE AND ACETAMINOPHEN 1 TABLET: 5; 325 TABLET ORAL at 18:50

## 2021-07-30 RX ADMIN — METRONIDAZOLE 500 MG: 500 INJECTION, SOLUTION INTRAVENOUS at 20:00

## 2021-07-31 ENCOUNTER — APPOINTMENT (OUTPATIENT)
Dept: GENERAL RADIOLOGY | Facility: HOSPITAL | Age: 76
End: 2021-07-31

## 2021-07-31 LAB
GLUCOSE BLDC GLUCOMTR-MCNC: 173 MG/DL (ref 70–130)
GLUCOSE BLDC GLUCOMTR-MCNC: 202 MG/DL (ref 70–130)
GLUCOSE BLDC GLUCOMTR-MCNC: 211 MG/DL (ref 70–130)
GLUCOSE BLDC GLUCOMTR-MCNC: 216 MG/DL (ref 70–130)

## 2021-07-31 PROCEDURE — 25010000002 ONDANSETRON PER 1 MG: Performed by: NURSE PRACTITIONER

## 2021-07-31 PROCEDURE — 99024 POSTOP FOLLOW-UP VISIT: CPT | Performed by: THORACIC SURGERY (CARDIOTHORACIC VASCULAR SURGERY)

## 2021-07-31 PROCEDURE — 71045 X-RAY EXAM CHEST 1 VIEW: CPT

## 2021-07-31 PROCEDURE — 94799 UNLISTED PULMONARY SVC/PX: CPT

## 2021-07-31 PROCEDURE — 82962 GLUCOSE BLOOD TEST: CPT

## 2021-07-31 PROCEDURE — 25010000002 CEFTRIAXONE PER 250 MG: Performed by: SURGERY

## 2021-07-31 PROCEDURE — 63710000001 INSULIN LISPRO (HUMAN) PER 5 UNITS: Performed by: SURGERY

## 2021-07-31 RX ORDER — PANTOPRAZOLE SODIUM 40 MG/10ML
40 INJECTION, POWDER, LYOPHILIZED, FOR SOLUTION INTRAVENOUS
Status: DISCONTINUED | OUTPATIENT
Start: 2021-07-31 | End: 2021-08-02 | Stop reason: HOSPADM

## 2021-07-31 RX ORDER — DIAZEPAM 2 MG/1
2 TABLET ORAL EVERY 6 HOURS PRN
Status: DISCONTINUED | OUTPATIENT
Start: 2021-07-31 | End: 2021-08-02 | Stop reason: HOSPADM

## 2021-07-31 RX ADMIN — LEVOTHYROXINE SODIUM 25 MCG: 0.03 TABLET ORAL at 06:39

## 2021-07-31 RX ADMIN — INSULIN LISPRO 4 UNITS: 100 INJECTION, SOLUTION INTRAVENOUS; SUBCUTANEOUS at 09:08

## 2021-07-31 RX ADMIN — CEFTRIAXONE SODIUM 1 G: 1 INJECTION, SOLUTION INTRAVENOUS at 17:35

## 2021-07-31 RX ADMIN — PANTOPRAZOLE SODIUM 40 MG: 40 INJECTION, POWDER, FOR SOLUTION INTRAVENOUS at 13:24

## 2021-07-31 RX ADMIN — INSULIN LISPRO 2 UNITS: 100 INJECTION, SOLUTION INTRAVENOUS; SUBCUTANEOUS at 17:25

## 2021-07-31 RX ADMIN — METRONIDAZOLE 500 MG: 500 INJECTION, SOLUTION INTRAVENOUS at 13:37

## 2021-07-31 RX ADMIN — METRONIDAZOLE 500 MG: 500 INJECTION, SOLUTION INTRAVENOUS at 06:39

## 2021-07-31 RX ADMIN — METRONIDAZOLE 500 MG: 500 INJECTION, SOLUTION INTRAVENOUS at 20:27

## 2021-07-31 RX ADMIN — ONDANSETRON 4 MG: 2 INJECTION INTRAMUSCULAR; INTRAVENOUS at 09:08

## 2021-07-31 RX ADMIN — METOPROLOL TARTRATE 50 MG: 50 TABLET, FILM COATED ORAL at 20:27

## 2021-07-31 RX ADMIN — ONDANSETRON 4 MG: 2 INJECTION INTRAMUSCULAR; INTRAVENOUS at 03:12

## 2021-07-31 RX ADMIN — METOPROLOL TARTRATE 50 MG: 50 TABLET, FILM COATED ORAL at 09:08

## 2021-07-31 RX ADMIN — DIAZEPAM 2 MG: 2 TABLET ORAL at 13:24

## 2021-07-31 RX ADMIN — LANSOPRAZOLE 30 MG: KIT at 06:39

## 2021-08-01 ENCOUNTER — APPOINTMENT (OUTPATIENT)
Dept: GENERAL RADIOLOGY | Facility: HOSPITAL | Age: 76
End: 2021-08-01

## 2021-08-01 LAB
ANION GAP SERPL CALCULATED.3IONS-SCNC: 8.9 MMOL/L (ref 5–15)
BASOPHILS # BLD AUTO: 0.04 10*3/MM3 (ref 0–0.2)
BASOPHILS NFR BLD AUTO: 0.4 % (ref 0–1.5)
BUN SERPL-MCNC: 15 MG/DL (ref 8–23)
BUN/CREAT SERPL: 25.4 (ref 7–25)
CALCIUM SPEC-SCNC: 9 MG/DL (ref 8.6–10.5)
CHLORIDE SERPL-SCNC: 98 MMOL/L (ref 98–107)
CO2 SERPL-SCNC: 26.1 MMOL/L (ref 22–29)
CREAT SERPL-MCNC: 0.59 MG/DL (ref 0.57–1)
DEPRECATED RDW RBC AUTO: 43.2 FL (ref 37–54)
EOSINOPHIL # BLD AUTO: 0.29 10*3/MM3 (ref 0–0.4)
EOSINOPHIL NFR BLD AUTO: 2.9 % (ref 0.3–6.2)
ERYTHROCYTE [DISTWIDTH] IN BLOOD BY AUTOMATED COUNT: 13 % (ref 12.3–15.4)
GFR SERPL CREATININE-BSD FRML MDRD: 99 ML/MIN/1.73
GLUCOSE BLDC GLUCOMTR-MCNC: 116 MG/DL (ref 70–130)
GLUCOSE BLDC GLUCOMTR-MCNC: 170 MG/DL (ref 70–130)
GLUCOSE BLDC GLUCOMTR-MCNC: 189 MG/DL (ref 70–130)
GLUCOSE BLDC GLUCOMTR-MCNC: 242 MG/DL (ref 70–130)
GLUCOSE SERPL-MCNC: 233 MG/DL (ref 65–99)
HCT VFR BLD AUTO: 39.7 % (ref 34–46.6)
HGB BLD-MCNC: 12.6 G/DL (ref 12–15.9)
IMM GRANULOCYTES # BLD AUTO: 0.05 10*3/MM3 (ref 0–0.05)
IMM GRANULOCYTES NFR BLD AUTO: 0.5 % (ref 0–0.5)
LYMPHOCYTES # BLD AUTO: 1.41 10*3/MM3 (ref 0.7–3.1)
LYMPHOCYTES NFR BLD AUTO: 14 % (ref 19.6–45.3)
MCH RBC QN AUTO: 28.6 PG (ref 26.6–33)
MCHC RBC AUTO-ENTMCNC: 31.7 G/DL (ref 31.5–35.7)
MCV RBC AUTO: 90 FL (ref 79–97)
MONOCYTES # BLD AUTO: 1.53 10*3/MM3 (ref 0.1–0.9)
MONOCYTES NFR BLD AUTO: 15.1 % (ref 5–12)
NEUTROPHILS NFR BLD AUTO: 6.78 10*3/MM3 (ref 1.7–7)
NEUTROPHILS NFR BLD AUTO: 67.1 % (ref 42.7–76)
NRBC BLD AUTO-RTO: 0 /100 WBC (ref 0–0.2)
PLATELET # BLD AUTO: 357 10*3/MM3 (ref 140–450)
PMV BLD AUTO: 10.7 FL (ref 6–12)
POTASSIUM SERPL-SCNC: 4 MMOL/L (ref 3.5–5.2)
RBC # BLD AUTO: 4.41 10*6/MM3 (ref 3.77–5.28)
SODIUM SERPL-SCNC: 133 MMOL/L (ref 136–145)
WBC # BLD AUTO: 10.1 10*3/MM3 (ref 3.4–10.8)

## 2021-08-01 PROCEDURE — 97110 THERAPEUTIC EXERCISES: CPT

## 2021-08-01 PROCEDURE — 71045 X-RAY EXAM CHEST 1 VIEW: CPT

## 2021-08-01 PROCEDURE — 82962 GLUCOSE BLOOD TEST: CPT

## 2021-08-01 PROCEDURE — 25010000002 CEFTRIAXONE PER 250 MG: Performed by: SURGERY

## 2021-08-01 PROCEDURE — 99024 POSTOP FOLLOW-UP VISIT: CPT | Performed by: NURSE PRACTITIONER

## 2021-08-01 PROCEDURE — 63710000001 INSULIN LISPRO (HUMAN) PER 5 UNITS: Performed by: SURGERY

## 2021-08-01 PROCEDURE — 80048 BASIC METABOLIC PNL TOTAL CA: CPT | Performed by: HOSPITALIST

## 2021-08-01 PROCEDURE — 85025 COMPLETE CBC W/AUTO DIFF WBC: CPT | Performed by: HOSPITALIST

## 2021-08-01 RX ADMIN — PANTOPRAZOLE SODIUM 40 MG: 40 INJECTION, POWDER, FOR SOLUTION INTRAVENOUS at 05:16

## 2021-08-01 RX ADMIN — INSULIN LISPRO 2 UNITS: 100 INJECTION, SOLUTION INTRAVENOUS; SUBCUTANEOUS at 17:07

## 2021-08-01 RX ADMIN — CEFTRIAXONE SODIUM 1 G: 1 INJECTION, SOLUTION INTRAVENOUS at 17:07

## 2021-08-01 RX ADMIN — DIAZEPAM 2 MG: 2 TABLET ORAL at 09:49

## 2021-08-01 RX ADMIN — METRONIDAZOLE 500 MG: 500 INJECTION, SOLUTION INTRAVENOUS at 20:47

## 2021-08-01 RX ADMIN — METOPROLOL TARTRATE 50 MG: 50 TABLET, FILM COATED ORAL at 20:47

## 2021-08-01 RX ADMIN — INSULIN LISPRO 4 UNITS: 100 INJECTION, SOLUTION INTRAVENOUS; SUBCUTANEOUS at 09:49

## 2021-08-01 RX ADMIN — METRONIDAZOLE 500 MG: 500 INJECTION, SOLUTION INTRAVENOUS at 14:17

## 2021-08-01 RX ADMIN — LEVOTHYROXINE SODIUM 25 MCG: 0.03 TABLET ORAL at 05:16

## 2021-08-01 RX ADMIN — METOPROLOL TARTRATE 50 MG: 50 TABLET, FILM COATED ORAL at 09:49

## 2021-08-01 RX ADMIN — METRONIDAZOLE 500 MG: 500 INJECTION, SOLUTION INTRAVENOUS at 05:16

## 2021-08-01 NOTE — PLAN OF CARE
Goal Outcome Evaluation:  Plan of Care Reviewed With: patient        Progress: no change  Outcome Summary: vss, no complaints of pain or nausea since starting prn valium. CT waterseal with mini 500. pt still goal on tubefeeds. pt has rested off and on during shift. pt remains strict npo. labs in am. poss d/c home w/ home health

## 2021-08-01 NOTE — THERAPY TREATMENT NOTE
Patient Name: Camilla Marcos  : 1945    MRN: [id number]                              Today's Date: 2021       Admit Date: 2021    Visit Dx:     ICD-10-CM ICD-9-CM   1. 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 Chon Pope   • Breast cancer (CMS/HCC)    • Delayed emergence from anesthesia     history   • Depression     situational   • Diabetes (CMS/HCC)     diet controlled  has weight loss   • Dysphagia    • Esophageal cancer (CMS/HCC)    • GERD (gastroesophageal reflux disease)    • HX: breast cancer     right   • Hypertension    • Leg swelling     left leg d/t chemo pill   • Osteoporosis    • Thyroid disorder     hypothyroid   • Thyroid nodule      Past Surgical History:   Procedure Laterality Date   • BREAST BIOPSY Right    • BREAST BIOPSY Left    • BREAST LUMPECTOMY Right    • ENDOSCOPY  2019   • ENDOSCOPY  2018   • ENDOSCOPY N/A 2019    Procedure: ESOPHAGOGASTRODUODENOSCOPY with DILATATION (12-15mm balloon);  Surgeon: Aldair Booker MD;  Location: [facility] ENDOSCOPY;  Service: Gastroenterology   • ENDOSCOPY N/A 2019    Procedure: ESOPHAGOGASTRODUODENOSCOPY with balloon dilitation 15-18mm) up to 16mm;  Surgeon: Aldair Booker MD;  Location: [facility] ENDOSCOPY;  Service: Gastroenterology   • ENDOSCOPY N/A  7/24/2020    Procedure: ESOPHAGOGASTRODUODENOSCOPY with balloon dilation(12mm-15mm up to 14.5mm) of esophageal anastomosis stricture;  Surgeon: Aldair Booker MD;  Location: [facility] ENDOSCOPY;  Service: Gastroenterology;  Laterality: N/A;  esophageal stricture   • ENDOSCOPY N/A 5/13/2021    Procedure: ESOPHAGOGASTRODUODENOSCOPY WITH BALLOON DILATION UP TO 19MM;  Surgeon: Lily Bettencourt MD;  Location: [facility] ENDOSCOPY;  Service: Gastroenterology;  Laterality: N/A;  ANASTAMOTIC STRICTURE   • ENDOSCOPY N/A 6/24/2021    Procedure: ESOPHAGOGASTRODUODENOSCOPY WITH  DILATATION WITH BALLOON (18-20MM, UP TO 20MM);  Surgeon: Lily Bettencourt MD;  Location: [facility] ENDOSCOPY;  Service: Gastroenterology;  Laterality: N/A;  ESOPHAGOGASTRO ANASTOMOTIC STRICTURE   • ENDOSCOPY N/A 7/22/2021    Procedure: ESOPHAGOGASTRODUodenoscopy;  Surgeon: Lily Bettencourt MD;  Location: [facility] ENDOSCOPY;  Service: Gastroenterology;  Laterality: N/A;  esophageal pleural fistula   • ENDOSCOPY N/A 7/23/2021    Procedure: ESOPHAGOGASTRODUODENOSCOPY with stent placement;  Surgeon: Lily Bettencourt MD;  Location: [facility] OR;  Service: Gastroenterology;  Laterality: N/A;   • ESOPHAGOGASTRECTOMY  03/04/2019    Royce Sunil   • JEJUNOSTOMY N/A 7/26/2021    Procedure: OPEN JEJUNOSTOMY TUBE;  Surgeon: Bulmaro Coreas Jr., MD;  Location: [facility];  Service: General;  Laterality: N/A;   • LAPAROSCOPIC TUBAL LIGATION     • THORACOTOMY Right 08/17/2018    right vats biopsy of mediastinal mass, right thoracotomy     General Information     Row Name 08/01/21 1026          Physical Therapy Time and Intention    Document Type  therapy note (daily note)  -     Mode of Treatment  physical therapy;individual therapy  -     Row Name 08/01/21 1026          Cognition    Orientation Status (Cognition)  oriented x 4  -     Row Name 08/01/21 1026          Safety Issues, Functional Mobility    Impairments Affecting Function (Mobility)   balance;endurance/activity tolerance;strength  -       User Key  (r) = Recorded By, (t) = Taken By, (c) = Cosigned By    Initials Name Provider Type     Mars Anderson, TRES Physical Therapy Assistant        Mobility     Row Name 08/01/21 1026          Bed Mobility    Supine-Sit [place] (Bed Mobility)  independent  -     Sit-Supine [place] (Bed Mobility)  independent  -[facility] Name 08/01/21 1026          Bed-Chair Transfer    Bed-Chair [place] (Transfers)  standby assist  -     Row Name 08/01/21 1026          Sit-Stand Transfer    Sit-Stand [place] (Transfers)  standby assist  -[facility] Name 08/01/21 1026          Gait/Stairs (Locomotion)    [place] Level (Gait)  standby assist;contact guard  -     Distance in Feet (Gait)  ~400 ft with no AD with intermittent UE support on wall/counter ambulating around nursing unit.  -     Deviations/Abnormal Patterns (Gait)  base of support, narrow;gait speed decreased;dalia decreased;stride length decreased  -     Bilateral Gait Deviations  forward flexed posture;heel strike decreased  -       User Key  (r) = Recorded By, (t) = Taken By, (c) = Cosigned By    Initials Name Provider Type     Mars Anderson, TRES Physical Therapy Assistant        Obj/Interventions     Row Name 08/01/21 1028          Balance    Balance Assessment  sitting static balance;standing static balance  -     Static Sitting Balance  WFL;unsupported  -     Static Standing Balance  WFL;unsupported  -       User Key  (r) = Recorded By, (t) = Taken By, (c) = Cosigned By    Initials Name Provider Type     Mars Anderson, TRES Physical Therapy Assistant        Goals/Plan    No documentation.       Clinical Impression     [facility] Name 08/01/21 1029          Pain Scale: Numbers Pre/Post-Treatment    Pretreatment Pain Rating  2/10  -RH     Posttreatment Pain Rating  2/10  -     Pain Location  -- soreness at tube site  -     Row Name 08/01/21 1029           Vital Signs    O2 Delivery Intra Treatment  room air  -RH     Row Name 08/01/21 1029          Positioning and Restraints    Pre-Treatment Position  in bed  -RH     Post Treatment Position  bed  -RH     In Bed  supine;call light within reach;side rails up x2  -RH       User Key  (r) = Recorded By, (t) = Taken By, (c) = Cosigned By    Initials Name Provider Type     Mars Anderson, TRES Physical Therapy Assistant        Outcome Measures     Row Name 08/01/21 1029          How much help from another person do you currently need...    Turning from your back to your side while in flat bed without using bedrails?  4  -RH     Moving from lying on back to sitting on the side of a flat bed without bedrails?  4  -RH     Moving to and from a bed to a chair (including a wheelchair)?  3  -RH     Standing up from a chair using your arms (e.g., wheelchair, bedside chair)?  3  -RH     Climbing 3-5 steps with a railing?  3  -RH     To walk in hospital room?  3  -RH     AM-PAC 6 Clicks Score (PT)  20  -RH       User Key  (r) = Recorded By, (t) = Taken By, (c) = Cosigned By    Initials Name Provider Type     Mars Anderson, TRES Physical Therapy Assistant                       Physical Therapy Education                 Title: PT OT SLP Therapies (Done)     Topic: Physical Therapy (Done)     Point: Mobility training (Done)     Learning Progress Summary           Patient Acceptance, E,D, VU by EB at 7/30/2021 1512    Acceptance, E, VU by DB at 7/29/2021 1033                   Point: Home exercise program (Done)     Learning Progress Summary           Patient Acceptance, E,D, VU by EB at 7/30/2021 1512    Acceptance, E, VU by DB at 7/29/2021 1033                   Point: Body mechanics (Done)     Learning Progress Summary           Patient Acceptance, E,D, VU by EB at 7/30/2021 1512    Acceptance, E, VU by DB at 7/29/2021 1033                   Point: Precautions (Done)     Learning Progress Summary           Patient  Acceptance, E,D, VU by EB at 7/30/2021 1512    Acceptance, E, VU by DB at 7/29/2021 1033                               User Key     Initials Effective Dates Name Provider Type Discipline    EB 06/16/21 -  Lakisha Dunn PTA Physical Therapy Assistant PT    DB 06/16/21 -  Hiral Tenorio PT Physical Therapist PT              PT Recommendation and Plan           Time Calculation:   PT Charges     Row Name 08/01/21 1030             Time Calculation    Start Time  1006  -RH      Stop Time  1030  -RH      Time Calculation (min)  24 min  -RH      PT Received On  08/01/21  -      PT - Next Appointment  08/02/21  -        User Key  (r) = Recorded By, (t) = Taken By, (c) = Cosigned By    Initials Name Provider Type     Mars Anderson PTA Physical Therapy Assistant        Therapy Charges for Today     Code Description Service Date Service Provider Modifiers Qty    [id number] HC PT THER PROC EA 15 MIN 8/1/2021 Mars Anderson PTA GP 2          PT G-Codes  Outcome Measure Options: AM-PAC 6 Clicks Basic Mobility (PT)  AM-PAC 6 Clicks Score (PT): 20    Mars Anderson PTA  8/1/2021

## 2021-08-01 NOTE — PROGRESS NOTES
"  POST-OPERATIVE NOTE     Chief Complaint: Gastric perforation, follow-up  S/P: Esophagogastroduodenoscopy with stent placement  POD # 9  S/P: Open jejunostomy tube insertion  POD # 6    Subjective:  Symptoms:  Improved.  She reports weakness, anorexia and anxiety.  No shortness of breath or chest pain.    Diet:  NPO.  No nausea or vomiting.    Activity level: Impaired due to weakness.    Pain:  She complains of pain that is mild.  Pain is well controlled.    Nausea improved today.    Objective:  General Appearance:  Comfortable, in no acute distress and ill-appearing.    Vital signs: (most recent): Blood pressure 115/67, pulse 73, temperature 97.5 °F (36.4 °C), temperature source Oral, resp. rate 16, height 165.1 cm (65\"), weight 56.4 kg (124 lb 6.4 oz), SpO2 95 %.  Vital signs are normal.  No fever.    Output: Producing urine and producing stool.    HEENT: Normal HEENT exam.    Lungs:  Normal effort and normal respiratory rate.  She is not in respiratory distress.    Heart: Normal rate.  Regular rhythm.    Abdomen: Abdomen is soft and non-distended.  Bowel sounds are normal.   There is no abdominal tenderness.   There is no mass.   Extremities: Normal range of motion.    Pulses: Distal pulses are intact.    Neurological: Patient is alert and oriented to person, place and time.    Skin:  Warm and dry.              Chest tube:   Site: Right, Clean, Dry, Intact and Securement device intact  Suction: waterseal  Air Leak: negative  24 Hour Total: 0cc    Results Review:     I reviewed the patient's new clinical results.  I reviewed the patient's new imaging results and agree with the interpretation.  I reviewed the patient's other test results and agree with the interpretation    Assessment/Plan     Gastric perforation: Patient is s/p EGD with stent placement and J-tube placement.  Today's chest x-ray is stable without significant interval change to position of esophageal stent.  No pneumothorax.  Chest tube has been " placed to mini 500 Pleur-evac in anticipation for discharge.    Patient to remain n.p.o. upon discharge. All home medications to be administered via J-tube.  Appreciate assistance from nursing staff with education regarding medication and tube feed administration via J-tube.  This was discussed with the nurse at length today.  Appreciate assistance from discharge planning regarding coordination of home health for tube feeds.      ID has recommended moxifloxacin 100 mg per J-tube every 24 hours for an additional 2 weeks upon discharge.     Disposition: Plan for discharge home tomorrow.  Dr. Bettencourt follow up with her in our [place] office on Monday, August 16.    Paula Santacruz DNP, APRN  [facility]  08/01/21  12:30 EDT    Patient was seen and assessed while wearing personal protective equipment including facemask, protective eyewear and gloves.  Hand hygiene performed prior to entering the room and upon exiting with doffing of gloves.

## 2021-08-01 NOTE — PROGRESS NOTES
"DAILY PROGRESS NOTE  [facility]    Patient Identification:  Name: Camilla Marcos  Age: 75 y.o.  Sex: female  :  1945  MRN: [id number]         Primary Care Physician: Homero Ro MD    Subjective:  Interval History:She complains of nausea.  Better.    Objective:    Scheduled Meds:cefTRIAXone, 1 g, Intravenous, Q24H  insulin lispro, 0-9 Units, Subcutaneous, TID AC  levothyroxine, 25 mcg, Per J Tube, Q AM  lidocaine, 1 patch, Transdermal, Q24H  lidocaine, 20 mL, Infiltration, Once  metoprolol tartrate, 50 mg, Per J Tube, Q12H  metroNIDAZOLE, 500 mg, Intravenous, Q8H  pantoprazole, 40 mg, Intravenous, Q AM      Continuous Infusions:     Vital signs in last 24 hours:  Temp:  [97.5 °F (36.4 °C)-98.4 °F (36.9 °C)] 97.5 °F (36.4 °C)  Heart Rate:  [] 73  Resp:  [16-17] 16  BP: (115-149)/(65-86) 115/67    Intake/Output:    Intake/Output Summary (Last 24 hours) at 2021 1217  Last data filed at 2021 1922  Gross per 24 hour   Intake 1601 ml   Output 1000 ml   Net 601 ml       Exam:  /67 (BP Location: Left arm, Patient Position: Lying)   Pulse 73   Temp 97.5 °F (36.4 °C) (Oral)   Resp 16   Ht 165.1 cm (65\")   Wt 56.4 kg (124 lb 6.4 oz)   SpO2 95%   BMI 20.70 kg/m²     General Appearance:    Alert, cooperative, no distress   Head:    Normocephalic, without obvious abnormality, atraumatic   Eyes:       Throat:   Lips, tongue, gums normal   Neck:   Supple, symmetrical, trachea midline, no JVD   Lungs:     Clear to auscultation bilaterally, respirations unlabored   Chest Wall:    No tenderness or deformity    Heart:    Regular rate and rhythm, S1 and S2 normal, no murmur,no  Rub or gallop   Abdomen:     Soft, nontender, bowel sounds active, no masses, no organomegaly    Extremities:   Extremities normal, atraumatic, no cyanosis or edema   Pulses:      Skin:   Skin is warm and dry,  no rashes or palpable lesions   Neurologic:   no focal deficits noted      Lab Results (last " 72 hours)     Procedure Component Value Units Date/Time    POC Glucose Once [[id number]]  (Abnormal) Collected: 07/31/21 1156    Specimen: Blood Updated: 07/31/21 1158     Glucose 173 mg/dL      Comment: Meter: [id number] : [id number] Zapato Gustabo NA       POC Glucose Once [[id number]]  (Abnormal) Collected: 07/31/21 0837    Specimen: Blood Updated: 07/31/21 0838     Glucose 211 mg/dL      Comment: Meter: [id number] : [id number] Zapato Gustabo NA       POC Glucose Once [[id number]]  (Abnormal) Collected: 07/30/21 1526    Specimen: Blood Updated: 07/30/21 1528     Glucose 229 mg/dL      Comment: Meter: [id number] : [id number] Vaca Katie NA       POC Glucose Once [[id number]]  (Abnormal) Collected: 07/30/21 1118    Specimen: Blood Updated: 07/30/21 1120     Glucose 150 mg/dL      Comment: Meter: [id number] : [id number] Vaca Katie NA       POC Glucose Once [[id number]]  (Abnormal) Collected: 07/30/21 0620    Specimen: Blood Updated: 07/30/21 0622     Glucose 190 mg/dL      Comment: Meter: [id number] : [id number] Woango Marcallend NA       POC Glucose Once [[id number]]  (Abnormal) Collected: 07/29/21 2037    Specimen: Blood Updated: 07/29/21 2039     Glucose 168 mg/dL      Comment: Meter: [id number] : [id number] Woango Marcallend NA       POC Glucose Once [[id number]]  (Normal) Collected: 07/29/21 1623    Specimen: Blood Updated: 07/29/21 1625     Glucose 113 mg/dL      Comment: Meter: [id number] : [id number] Vaca Katie NA       POC Glucose Once [[id number]]  (Abnormal) Collected: 07/29/21 1154    Specimen: Blood Updated: 07/29/21 1155     Glucose 142 mg/dL      Comment: Meter: [id number] : [id number] Vaca Katie NA       Basic Metabolic Panel [[id number]]  (Abnormal) Collected: 07/29/21 0707    Specimen: Blood Updated: 07/29/21 0817     Glucose 147 mg/dL      BUN 11 mg/dL      Creatinine 0.46 mg/dL      Sodium 139 mmol/L      Potassium 3.9 mmol/L      Chloride 105 mmol/L      CO2  26.8 mmol/L      Calcium 8.7 mg/dL      eGFR Non African Amer 132 mL/min/1.73      BUN/Creatinine Ratio 23.9     Anion Gap 7.2 mmol/L     Narrative:      GFR Normal >60  Chronic Kidney Disease <60  Kidney Failure <15      CBC (No Diff) [[id number]]  (Normal) Collected: 07/29/21 0707    Specimen: Blood Updated: 07/29/21 0813     WBC 7.73 10*3/mm3      RBC 4.28 10*6/mm3      Hemoglobin 12.6 g/dL      Hematocrit 37.2 %      MCV 86.9 fL      MCH 29.4 pg      MCHC 33.9 g/dL      RDW 12.8 %      RDW-SD 40.9 fl      MPV 10.4 fL      Platelets 339 10*3/mm3     POC Glucose Once [[id number]]  (Abnormal) Collected: 07/29/21 0606    Specimen: Blood Updated: 07/29/21 0607     Glucose 151 mg/dL      Comment: Meter: [id number] : [id number] Patrick HUTCHINSON           Data Review:  Results from last 7 days   Lab Units 08/01/21  0345 07/29/21 0707 07/29/21  0707 07/28/21  0637 07/28/21 0637   SODIUM mmol/L 133*  --  139  --  138   POTASSIUM mmol/L 4.0  --  3.9  --  4.0   CHLORIDE mmol/L 98  --  105  --  107   CO2 mmol/L 26.1  --  26.8  --  22.2   BUN mg/dL 15  --  11  --  15   CREATININE mg/dL 0.59  --  0.46*  --  0.62   GLUCOSE mg/dL 233*   < > 147*   < > 161*   CALCIUM mg/dL 9.0  --  8.7  --  8.8    < > = values in this interval not displayed.     Results from last 7 days   Lab Units 08/01/21  0346 07/29/21  0707 07/28/21 0637   WBC 10*3/mm3 10.10 7.73 11.44*   HEMOGLOBIN g/dL 12.6 12.6 13.1   HEMATOCRIT % 39.7 37.2 39.7   PLATELETS 10*3/mm3 357 339 306             Lab Results   Lab Value Date/Time    TROPONINT <0.01 12/23/2020 1057    TROPONINT <0.01 12/22/2020 1551    TROPONINT 0.02 01/14/2020 1800    TROPONINT <0.01 03/01/2019 0503               Invalid input(s): PROT, LABALBU          Glucose   Date/Time Value Ref Range Status   08/01/2021 1149 116 70 - 130 mg/dL Final     Comment:     Meter: [id number] : [id number] Radha HUTCHINSON   08/01/2021 0633 242 (H) 70 - 130 mg/dL Final     Comment:     Meter: [id number] :  [id number] Kevyn Butler RN   07/31/2021 2028 216 (H) 70 - 130 mg/dL Final     Comment:     Meter: [id number] : [id number] Daniel Uriostegui RN   07/31/2021 1627 202 (H) 70 - 130 mg/dL Final     Comment:     Meter: [id number] : [id number] Zapato Gustabo NA   07/31/2021 1156 173 (H) 70 - 130 mg/dL Final     Comment:     Meter: [id number] : [id number] Zapato Gustabo NA   07/31/2021 0837 211 (H) 70 - 130 mg/dL Final     Comment:     Meter: [id number] : [id number] Zapato Gustabo NA   07/30/2021 1526 229 (H) 70 - 130 mg/dL Final     Comment:     Meter: [id number] : [id number] Nola Wilson NA   07/30/2021 1118 150 (H) 70 - 130 mg/dL Final     Comment:     Meter: [id number] : [id number] Vaca Katie NA           Past Medical History:   Diagnosis Date   • A-fib (CMS/HCC)     dr. Chon Pope   • Breast cancer (CMS/HCC)    • Delayed emergence from anesthesia     history   • Depression     situational   • Diabetes (CMS/HCC)     diet controlled  has weight loss   • Dysphagia    • Esophageal cancer (CMS/HCC) 2019   • GERD (gastroesophageal reflux disease)    • HX: breast cancer 2001    right   • Hypertension    • Leg swelling     left leg d/t chemo pill   • Osteoporosis    • Thyroid disorder     hypothyroid   • Thyroid nodule        Assessment:  Active Hospital Problems    Diagnosis  POA   • **Esophageal dysphagia [R13.10]  Yes   • PAF with RVR (paroxysmal atrial fibrillation) (CMS/HCC) [I48.0]  Yes   • History of esophagectomy [Z98.890, Z90.49]  Not Applicable   • 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       Plan:  Continue  current RX as per surgery. Maybe home tomorrow with home health if all arranged.  Follow lab.    Wesley Plunkett MD  8/1/2021  12:17 EDT

## 2021-08-02 ENCOUNTER — APPOINTMENT (OUTPATIENT)
Dept: GENERAL RADIOLOGY | Facility: HOSPITAL | Age: 76
End: 2021-08-02

## 2021-08-02 VITALS
DIASTOLIC BLOOD PRESSURE: 73 MMHG | WEIGHT: 124.4 LBS | SYSTOLIC BLOOD PRESSURE: 125 MMHG | TEMPERATURE: 97.7 F | HEIGHT: 65 IN | HEART RATE: 88 BPM | OXYGEN SATURATION: 98 % | RESPIRATION RATE: 16 BRPM | BODY MASS INDEX: 20.73 KG/M2

## 2021-08-02 LAB
ANION GAP SERPL CALCULATED.3IONS-SCNC: 6.2 MMOL/L (ref 5–15)
BASOPHILS # BLD AUTO: 0.07 10*3/MM3 (ref 0–0.2)
BASOPHILS NFR BLD AUTO: 0.8 % (ref 0–1.5)
BUN SERPL-MCNC: 19 MG/DL (ref 8–23)
BUN/CREAT SERPL: 35.2 (ref 7–25)
CALCIUM SPEC-SCNC: 8.7 MG/DL (ref 8.6–10.5)
CHLORIDE SERPL-SCNC: 101 MMOL/L (ref 98–107)
CO2 SERPL-SCNC: 27.8 MMOL/L (ref 22–29)
CREAT SERPL-MCNC: 0.54 MG/DL (ref 0.57–1)
DEPRECATED RDW RBC AUTO: 41.9 FL (ref 37–54)
EOSINOPHIL # BLD AUTO: 0.34 10*3/MM3 (ref 0–0.4)
EOSINOPHIL NFR BLD AUTO: 3.9 % (ref 0.3–6.2)
ERYTHROCYTE [DISTWIDTH] IN BLOOD BY AUTOMATED COUNT: 12.9 % (ref 12.3–15.4)
GFR SERPL CREATININE-BSD FRML MDRD: 110 ML/MIN/1.73
GLUCOSE BLDC GLUCOMTR-MCNC: 151 MG/DL (ref 70–130)
GLUCOSE BLDC GLUCOMTR-MCNC: 197 MG/DL (ref 70–130)
GLUCOSE SERPL-MCNC: 185 MG/DL (ref 65–99)
HCT VFR BLD AUTO: 36.5 % (ref 34–46.6)
HGB BLD-MCNC: 11.5 G/DL (ref 12–15.9)
IMM GRANULOCYTES # BLD AUTO: 0.06 10*3/MM3 (ref 0–0.05)
IMM GRANULOCYTES NFR BLD AUTO: 0.7 % (ref 0–0.5)
LYMPHOCYTES # BLD AUTO: 1.86 10*3/MM3 (ref 0.7–3.1)
LYMPHOCYTES NFR BLD AUTO: 21.1 % (ref 19.6–45.3)
MCH RBC QN AUTO: 28 PG (ref 26.6–33)
MCHC RBC AUTO-ENTMCNC: 31.5 G/DL (ref 31.5–35.7)
MCV RBC AUTO: 88.8 FL (ref 79–97)
MONOCYTES # BLD AUTO: 1.26 10*3/MM3 (ref 0.1–0.9)
MONOCYTES NFR BLD AUTO: 14.3 % (ref 5–12)
NEUTROPHILS NFR BLD AUTO: 5.22 10*3/MM3 (ref 1.7–7)
NEUTROPHILS NFR BLD AUTO: 59.2 % (ref 42.7–76)
NRBC BLD AUTO-RTO: 0 /100 WBC (ref 0–0.2)
PLATELET # BLD AUTO: 333 10*3/MM3 (ref 140–450)
PMV BLD AUTO: 10.5 FL (ref 6–12)
POTASSIUM SERPL-SCNC: 4.2 MMOL/L (ref 3.5–5.2)
RBC # BLD AUTO: 4.11 10*6/MM3 (ref 3.77–5.28)
SODIUM SERPL-SCNC: 135 MMOL/L (ref 136–145)
WBC # BLD AUTO: 8.81 10*3/MM3 (ref 3.4–10.8)

## 2021-08-02 PROCEDURE — 63710000001 INSULIN LISPRO (HUMAN) PER 5 UNITS: Performed by: SURGERY

## 2021-08-02 PROCEDURE — 85025 COMPLETE CBC W/AUTO DIFF WBC: CPT | Performed by: HOSPITALIST

## 2021-08-02 PROCEDURE — 82962 GLUCOSE BLOOD TEST: CPT

## 2021-08-02 PROCEDURE — 80048 BASIC METABOLIC PNL TOTAL CA: CPT | Performed by: HOSPITALIST

## 2021-08-02 PROCEDURE — 94799 UNLISTED PULMONARY SVC/PX: CPT

## 2021-08-02 PROCEDURE — 71045 X-RAY EXAM CHEST 1 VIEW: CPT

## 2021-08-02 PROCEDURE — 99232 SBSQ HOSP IP/OBS MODERATE 35: CPT | Performed by: NURSE PRACTITIONER

## 2021-08-02 RX ORDER — DIAZEPAM 2 MG/1
2 TABLET ORAL EVERY 8 HOURS PRN
Qty: 30 TABLET | Refills: 0 | Status: SHIPPED | OUTPATIENT
Start: 2021-08-02 | End: 2021-10-05

## 2021-08-02 RX ORDER — MOXIFLOXACIN HYDROCHLORIDE 400 MG/1
200 TABLET ORAL DAILY
Qty: 7 TABLET | Refills: 0 | Status: SHIPPED | OUTPATIENT
Start: 2021-08-02 | End: 2021-08-16

## 2021-08-02 RX ORDER — SACCHAROMYCES BOULARDII 250 MG
250 CAPSULE ORAL 2 TIMES DAILY
Qty: 60 CAPSULE | Refills: 0 | Status: SHIPPED | OUTPATIENT
Start: 2021-08-02 | End: 2021-09-01

## 2021-08-02 RX ORDER — GLIPIZIDE 5 MG/1
2.5 TABLET ORAL
Qty: 60 TABLET | Refills: 0 | Status: SHIPPED | OUTPATIENT
Start: 2021-08-02 | End: 2021-10-05

## 2021-08-02 RX ORDER — GLUCAGON 1 MG
1 KIT INJECTION DAILY PRN
Qty: 1 EACH | Refills: 0 | Status: SHIPPED | OUTPATIENT
Start: 2021-08-02 | End: 2021-08-07

## 2021-08-02 RX ORDER — GLIPIZIDE 5 MG/1
2.5 TABLET ORAL
Status: DISCONTINUED | OUTPATIENT
Start: 2021-08-02 | End: 2021-08-02 | Stop reason: HOSPADM

## 2021-08-02 RX ORDER — OXYCODONE HYDROCHLORIDE AND ACETAMINOPHEN 5; 325 MG/1; MG/1
1 TABLET ORAL 2 TIMES DAILY PRN
Qty: 6 TABLET | Refills: 0 | Status: SHIPPED | OUTPATIENT
Start: 2021-08-02 | End: 2021-08-05

## 2021-08-02 RX ORDER — METOPROLOL TARTRATE 50 MG/1
50 TABLET, FILM COATED ORAL EVERY 12 HOURS SCHEDULED
Qty: 60 TABLET | Refills: 0 | Status: SHIPPED | OUTPATIENT
Start: 2021-08-02 | End: 2022-08-20

## 2021-08-02 RX ADMIN — PANTOPRAZOLE SODIUM 40 MG: 40 INJECTION, POWDER, FOR SOLUTION INTRAVENOUS at 05:09

## 2021-08-02 RX ADMIN — METOPROLOL TARTRATE 50 MG: 50 TABLET, FILM COATED ORAL at 08:02

## 2021-08-02 RX ADMIN — LEVOTHYROXINE SODIUM 25 MCG: 0.03 TABLET ORAL at 05:09

## 2021-08-02 RX ADMIN — INSULIN LISPRO 2 UNITS: 100 INJECTION, SOLUTION INTRAVENOUS; SUBCUTANEOUS at 07:56

## 2021-08-02 RX ADMIN — METRONIDAZOLE 500 MG: 500 INJECTION, SOLUTION INTRAVENOUS at 13:15

## 2021-08-02 RX ADMIN — METRONIDAZOLE 500 MG: 500 INJECTION, SOLUTION INTRAVENOUS at 05:09

## 2021-08-02 NOTE — DISCHARGE SUMMARY
Patient Name: Camilla Marcos  : 1945  MRN: [id number]    Date of Admission: 2021  Date of Discharge:  2021  Primary Care Physician: Homero Ro MD      Chief Complaint:   No chief complaint on file.      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        Hospital Course     Ms. Marcos is a 75 y.o. female with a history of CAD, diabetes mellitus type 2, esophageal cancer, atrial fibrillation, hypertension, hypothyroidism who presented to [facility] initially complaining of difficulty swallowing.  Please see the admitting history and physical for further details.  She was found to have gastric perforation & esophageal dysphagia and was admitted to the hospital for further evaluation and treatment.      75-year-old with history of previous gastric esophageal tumor with previous esophagectomy presents with esophageal stricture and perforation and underwent stent placement by CT surgery on  and chest tube insertion on  with eventual transition from continuous suction to many 500 Pleur-evac given stability on chest x-ray.  Follow-up with Dr. Bettencourt in office on  discussed previously.    Infectious disease followed patient recommended empiric antibiotic therapy IV ceftriaxone and Flagyl during hospital admission with eventual transition  to p.o. moxifloxacin 2 weeks duration following discharge to complete 3 weeks course of antibiotic therapy.  J-tube placed for dysphagia and tolerating tube feeds per registered dietitian recommendation continued at discharge.  Patient to remain n.p.o. at discharge.  Physical therapy recommended rehab at discharge; however, patient and patient's family prefer home with home health.      Cardiology evaluated patient atrial fibrillation noted paroxysmal and currently in sinus rhythm and recommend continue anticoagulant per CTS recommendations add metoprolol; however, continue to hold digoxin.    A1c 6.7 with approximately 6 units lispro insulin coverage provided throughout the day.  Given eGFR >110 recommend Glucotrol 2.5 mg p.o. twice daily at discharge and follow-up with primary care provider for continued monitoring glucose.    Day of Discharge     Subjective:  Patient appears elderly, frail, generally weak, relatively comfortable, no apparent distress.  Tolerating tube feeds.  Voices no new concerns.  Discussed with RN.  Patient eager to discharge home with home health on a 8/2/2021 & agrees with plan for follow-up primary care provider & cardiothoracic surgery as previously discussed.    Physical Exam:  Temp:  [97.4 °F (36.3 °C)-97.7 °F (36.5 °C)] 97.7 °F (36.5 °C)  Heart Rate:  [75-89] 88  Resp:  [16] 16  BP: (116-137)/(64-96) 125/73  Body mass index is 20.7 kg/m².     Physical Exam  Constitutional:       General: She is not in acute distress.     Appearance: She is ill-appearing. She is not toxic-appearing.   HENT:      Head: Normocephalic and atraumatic.   Eyes:      Extraocular Movements: Extraocular movements intact.      Conjunctiva/sclera: Conjunctivae normal.   Cardiovascular:      Rate and Rhythm: Normal rate.      Heart sounds: Normal heart sounds.   Pulmonary:      Effort: Pulmonary effort is normal.      Breath sounds: Normal breath sounds.   Abdominal:      General: There is no distension.       Palpations: Abdomen is soft.      Tenderness: There is no abdominal tenderness. There is no guarding.      Comments: PEJ   Musculoskeletal:         General: No tenderness.      Cervical back: Normal range of motion and neck supple.      Right lower leg: No edema.      Left lower leg: No edema.   Skin:     General: Skin is warm and dry.      Coloration: Skin is pale.   Neurological:      Mental Status: She is alert and oriented to person, place, and time.      Cranial Nerves: No cranial nerve deficit.      Motor: Weakness (Generalized) present.   Psychiatric:         Behavior: Behavior normal.         Thought Content: Thought content normal.         Consultants     Consult Orders (all) (From admission, onward)     Start     Ordered    08/02/21 1021  Inpatient Nutrition Consult  Once     Comments: Cyclic feeding recommendation at DC--Peptamen AF at 85mL/hr x 16hrs per day.  Flush with 35mL free water x 16hrs   Provider:  (Not yet assigned)    08/02/21 1023    07/28/21 1451  Inpatient Cardiology Consult  Once     Provider:  Kendra Maitas MD    07/28/21 1450    07/28/21 1341  Inpatient Infectious Diseases Consult  Once     Specialty:  Infectious Diseases  Provider:  Eliezer Gan MD    07/28/21 1341    07/26/21 2232  Inpatient Nutrition Consult  Once     Provider:  (Not yet assigned)    07/26/21 2232    07/24/21 1806  Inpatient Case Management  Consult  Once     Provider:  (Not yet assigned)    07/24/21 1805    07/23/21 0832  Inpatient General Surgery Consult  Once     Specialty:  General Surgery  Provider:  Lamont Vargas MD    07/23/21 0832    07/22/21 2319  Inpatient Thoracic Surgery Consult  Once     Specialty:  Thoracic Surgery  Provider:  Lily Bettencourt MD    07/22/21 2319              Procedures     OPEN JEJUNOSTOMY TUBE      Imaging Results (All)     Procedure Component Value Units Date/Time    XR Chest 1 View [[id number]] Collected: 08/02/21 0755     Updated: 08/02/21 0759     Narrative:      XR CHEST 1 VW-     Clinical: Chest tube management     COMPARISON 8/1/2021     FINDINGS: Trace free intraperitoneal air under the right hemidiaphragm.  Esophageal stent position is stable. Pigtail chest tube position is  stable. No effusion, edema or acute airspace disease has developed.  Cardiomediastinal silhouette is similar to the previous examination.     CONCLUSION: Stable chest     This report was finalized on 8/2/2021 7:56 AM by Dr. Andrew Chamorro M.D.       XR Chest 1 View [[id number]] Collected: 08/01/21 0639     Updated: 08/01/21 0646    Narrative:      XR CHEST 1 VW-     HISTORY: Female who is 75 years-old,  chest tube     TECHNIQUE: Frontal views of the chest     COMPARISON: 07/31/2021     FINDINGS: Stable appearing right chest tube. Esophageal stent is noted.  The heart size is normal. Aorta is tortuous, calcified. Pulmonary  vasculature is unremarkable. No focal pulmonary consolidation, pleural  effusion, or pneumothorax. Minimal free intraperitoneal gas is again  apparent on this exam. No acute osseous process.       Impression:      No significant change.     This report was finalized on 8/1/2021 6:43 AM by Dr. Joss Cotto M.D.       XR Chest 1 View [[id number]] Collected: 07/31/21 0731     Updated: 07/31/21 0731    Narrative:        Patient: HEMANTH GRAY  Time Out: 07:30  Exam(s): FILM CXR 1 VIEW     EXAM:    XR Chest, 1 View    CLINICAL HISTORY:     Reason for exam: chest tube management.    TECHNIQUE:    Frontal view of the chest.    COMPARISON:    7 30 21    FINDINGS:      No change in position and of pigtail catheter in the right apex.    Esophageal stent again noted.  No change in right mediastinal prominence.    No significant interval changes.      Impression:          Electronically signed by Lamont Mcnulty DO on 07-31-21 at 0730    XR Chest 1 View [[id number]] Collected: 07/30/21 0702     Updated: 07/30/21 0707    Narrative:      XR CHEST 1 VW-     Clinical:  Chest tube management     COMPARISON CT scan of the chest 7/28/2021 and chest radiograph 7/28/2021     FINDINGS: No interval change in the position of the esophageal stent.  Pigtail catheter tip superimposes the right apex. Mediastinum stable.  Heart size within normal limits. No pneumothorax, effusion or active  airspace disease has developed. The amount of free intraperitoneal air  has diminished within the interim. The remainder is unremarkable.     This report was finalized on 7/30/2021 7:04 AM by Dr. Andrew Chamorro M.D.       CT Guided Chest Tube [[id number]] Collected: 07/29/21 1528     Updated: 07/29/21 1533    Narrative:      PROCEDURE: CT guided right chest tube placement     HISTORY: perforated gastric conduit; R13.10-Dysphagia, unspecified,  history of esophagectomy with gastric pull-through     TECHNIQUE:  Radiation dose reduction techniques were utilized, including automated  exposure control and exposure modulation based on body size.     The procedural risks, benefits, and alternatives were discussed with the  patient. Informed consent was obtained.        The patient was placed in the oblique position on the CT procedure  table. Axial CT scan was performed to localize the air collection in the  right apex adjacent to the anastomosis of the gastric pull-through. The  overlying skin was prepped and draped in the usual sterile fashion. 1%  buffered lidocaine was used for local anesthesia.     Next, a 5 [place] Yueh catheter was advanced into the collection under CT  guidance. A superstiff guidewire was advanced through the needle. The  tract was serially dilated. Next a 8 [place] pigtail catheter was  advanced over the wire into the collection. The catheter was sutured in  place and connected to an Atrium. No immediate complications.       Impression:      Successful CT guided right chest tube placement.     Moderate sedation was provided under my direct supervision using 1 mg IV  Versed and 50 mcg IV  Fentanyl. The patient was independently monitored  by a trained Department of Radiology RN using automated blood pressure,  EKG, and pulse oximetry. My total intra-service time was 20 minutes.      Radiation dose reduction techniques were utilized, including automated  exposure control and exposure modulation based on body size.     This report was finalized on 7/29/2021 3:30 PM by Dr. Perez Fernandez M.D.       CT Chest Without Contrast Diagnostic [[id number]] Collected: 07/28/21 1355     Updated: 07/28/21 1401    Narrative:      CT CHEST WO CONTRAST DIAGNOSTIC-     Radiation dose reduction techniques were utilized, including automated  exposure control and exposure modulation based on body size.     CLINICAL INFORMATION: 75-year-old female, status post esophagectomy with  gastric pull-through with stricture formation at the anastomotic site,  esophageal stent placement. Gastric perforation just beyond the  esophageal anastomotic stricture. Also open jejunostomy placement  performed.     COMPARISON: Outside CT examination 03/12/2021.     FINDINGS: The esophageal stent successfully transverses the previously  demonstrated stricture. There is no extraluminal gas or fluid  demonstrated at the suture line where the site of perforation is  located. Redundant dilated gas-filled distal esophagus surrounds the  proximal orifice of the stent and the configuration of the esophagus at  this location has not changed. There is a combination of fluid and gas  within the stent having a typical appearance. No gas is demonstrated  within the mediastinum. The remainder of the gastric pull-through is  stable in appearance. There is a considerable amount of free  intraperitoneal air within the upper abdomen likely related to open  jejunostomy tube placement.     Cardiac size stable and at the upper limits of normal. No pericardial  abnormality seen. Heavy coronary artery calcification. There is  atherosclerotic calcification of the  aorta. Diameter of the ascending  thoracic aorta is within normal limits at 3.8 cm. No mediastinal or  hilar mass/lymphadenopathy. There is minimal curvilinear dependent  atelectasis demonstrated within each costophrenic sulcus.     There is again elevation of the right hemidiaphragm. There is right lung  scar formation. No acute airspace disease nor pleural effusion has  developed.     There is a tiny new subpleural nodular opacity which is demonstrated  within the right lower lobe, on image #56 and 57, measuring  approximately 6 mm, this likely represents a tiny focus of rounded  atelectasis.     There is again enlargement of the left thyroid with macrocalcifications.     CONCLUSION:  1. Free intraperitoneal air within the upper abdomen likely related to  recent open jejunostomy.  2. There has been interval placement of an esophageal stent crossing the  stricture at the anastomotic site, the position is satisfactory.  3. No abnormal gas or fluid at the site of perforation located near the  anastomosis.  4. Likely bilateral atelectasis as described above.              This report was finalized on 7/28/2021 1:58 PM by Dr. Andrew Chamorro M.D.       XR Chest 1 View [[id number]] Collected: 07/28/21 0620     Updated: 07/28/21 0629    Narrative:      XR CHEST 1 VW-     Clinical: Esophageal stent     COMPARISON C-arm images 7/23/2021 and chest CT 3/12/2021     FINDINGS: Esophageal stent position is stable. Right rib deformities  again noted. No effusion, edema or acute airspace disease. Cardiac size  stable.     There is free intraperitoneal air demonstrated on the right  hemidiaphragm likely related to open jejunostomy tube placement on  7/26/2021.     CONCLUSION::  1. No acute pulmonary process has developed.  2. Free intraperitoneal air likely related to tracheostomy tube  placement.     This report was finalized on 7/28/2021 6:26 AM by Dr. Andrew Chamorro M.D.       XR Chest 1 View [[id number]] Collected: 07/23/21 3498      Updated: 07/23/21 1702    Narrative:      ONE-VIEW PORTABLE CHEST IN OR     HISTORY: Imaging during placement of esophageal stent.     FINDINGS: Imaging in the operating room was performed at the time of  insertion of a long esophageal stent. Please also see the operative  report. One image was obtained and the fluoroscopy time measures 1  minute 48 seconds.     This report was finalized on 7/23/2021 4:59 PM by Dr. Brandt Rico M.D.       FL C Arm During Surgery [[id number]] Resulted: 07/23/21 1542     Updated: 07/23/21 1542    Narrative:      This procedure was auto-finalized with no dictation required.            Pertinent Labs     Results from last 7 days   Lab Units 08/02/21 0723 08/01/21 0346 07/29/21 0707 07/28/21  0637   WBC 10*3/mm3 8.81 10.10 7.73 11.44*   HEMOGLOBIN g/dL 11.5* 12.6 12.6 13.1   PLATELETS 10*3/mm3 333 357 339 306     Results from last 7 days   Lab Units 08/02/21 0723 08/01/21 0345 07/29/21 0707 07/28/21  0637   SODIUM mmol/L 135* 133* 139 138   POTASSIUM mmol/L 4.2 4.0 3.9 4.0   CHLORIDE mmol/L 101 98 105 107   CO2 mmol/L 27.8 26.1 26.8 22.2   BUN mg/dL 19 15 11 15   CREATININE mg/dL 0.54* 0.59 0.46* 0.62   GLUCOSE mg/dL 185* 233* 147* 161*   Estimated Creatinine Clearance: 54.1 mL/min (A) (by C-G formula based on SCr of 0.54 mg/dL (L)).    Results from last 7 days   Lab Units 08/02/21 0723 08/01/21 0345 07/29/21 0707 07/28/21  0637   CALCIUM mg/dL 8.7 9.0 8.7 8.8               Invalid input(s): LDLCALC          Test Results Pending at Discharge       Discharge Details        Discharge Medications      New Medications      Instructions Start Date   diazePAM 2 MG tablet  Commonly known as: VALIUM   2 mg, Per J Tube, Every 8 Hours PRN      glipizide 5 MG tablet  Commonly known as: GLUCOTROL   2.5 mg, Oral, 2 Times Daily Before Meals      Glucagon Emergency 1 MG/ML reconstituted solution   1 each, Per J Tube, Daily PRN      metoprolol tartrate 50 MG tablet  Commonly known as:  LOPRESSOR   50 mg, Per J Tube, Every 12 Hours Scheduled      moxifloxacin 400 MG tablet  Commonly known as: Avelox   200 mg, Oral, Daily      oxyCODONE-acetaminophen 5-325 MG per tablet  Commonly known as: PERCOCET   1 tablet, Oral, 2 Times Daily PRN      saccharomyces boulardii 250 MG capsule  Commonly known as: Florastor   250 mg, Oral, 2 Times Daily         Continue These Medications      Instructions Start Date   Eliquis 5 MG tablet tablet  Generic drug: apixaban   5 mg, Oral, Every 12 Hours Scheduled, Will check with dr bettencourt regarding stop date      ferrous sulfate 325 (65 FE) MG tablet   325 mg, Oral, Daily With Breakfast, Hold DOS      levothyroxine 25 MCG tablet  Commonly known as: SYNTHROID, LEVOTHROID   25 mcg, Oral, Daily, Take DOS      omeprazole 20 MG capsule  Commonly known as: priLOSEC   20 mg, Oral, Daily      VITAMIN B-12 IJ   1 ampule, Subcutaneous, Every 30 Days, Aug. 3rd next due      Zofran 8 MG tablet  Generic drug: ondansetron   8 mg, Oral, Every 8 Hours PRN         Stop These Medications    diphenhydrAMINE 25 mg capsule  Commonly known as: BENADRYL     furosemide 40 MG tablet  Commonly known as: LASIX     metoprolol succinate  MG 24 hr tablet  Commonly known as: TOPROL-XL     potassium chloride 20 MEQ CR tablet  Commonly known as: K-DURKLOR-CON     spironolactone 25 MG tablet  Commonly known as: ALDACTONE            Allergies   Allergen Reactions   • Amoxicillin Rash   • Contrast Dye Rash   • Iodine Rash       Discharge Disposition:  Home-Health Care Svc      Discharge Diet:  Diet Order   Procedures   • NPO Diet       Discharge Activity:       CODE STATUS:    Code Status and Medical Interventions:   Ordered at: 07/22/21 7391     Code Status:    CPR     Medical Interventions (Level of Support Prior to Arrest):    Full       Future Appointments   Date Time Provider Department Center   8/16/2021 11:00 AM [facility] CT 1 [facility] CT [facility]   8/16/2021  2:30 PM Lily Bettencourt MD [facility]    8/19/2021 11:45 AM Lily Bettencourt MD [facility]   8/24/2021 10:00 AM Lily Bettencourt MD [facility]   8/26/2021 11:45 AM Lily Bettencourt MD [facility]     Additional Instructions for the Follow-ups that You Need to Schedule     Ambulatory Referral to Home Health   As directed      Face to Face Visit Date: 8/1/2021    Follow-up provider for Plan of Care?: I treated the patient in an acute care facility and will not continue treatment after discharge.    Follow-up provider: LILY BETTENCOURT [[id number]]    Reason/Clinical Findings: esophogeal leak    Describe mobility limitations that make leaving home difficult: frequent tube feedings    Nursing/Therapeutic Services Requested: Skilled Nursing    Skilled nursing orders: Enteral therapy    Frequency: Other         Ambulatory Referral to Home Health   As directed      Face to Face Visit Date: 8/2/2021    Follow-up provider for Plan of Care?: I treated the patient in an acute care facility and will not continue treatment after discharge.    Follow-up provider: CHAGO VILLALBA [[id number]]    Reason/Clinical Findings: esophageal dysphagia    Describe mobility limitations that make leaving home difficult: esophageal dysphagia    Nursing/Therapeutic Services Requested: Skilled Nursing    Skilled nursing orders: Enteral therapy    Frequency: 1 Week 1         Discharge Follow-up with PCP   As directed       Currently Documented PCP:    Chago Villalba MD    PCP Phone Number:    [phone]     Follow Up Details: Please call to schedule a one week or earlist available follow-up appointment PCP; BP, glucose, BMP         XR Chest 2 View    Aug 15, 2021 (Approximate)      Exam reason: s/p esophageal stent placement           Follow-up Information     Chago Villalba MD .    Specialty: Hematology and Oncology  Why: Please call to schedule a one week or earlist available follow-up appointment PCP; BP, glucose, BMP  Contact information:  [address]  [address]  [place] [address]  [phone]             Rut, Lily L, MD. Go on 8/16/2021.    Specialty: Thoracic Surgery  Why: You have a CT scan scheduled at [facility] on 8/16/21 at 11 AM.  Follow-up appointment in our office at 2:30 PM on 8/16/2021.  Contact information:  [address]  [place] [id number] [phone]                   Additional Instructions for the Follow-ups that You Need to Schedule     Ambulatory Referral to Home Health   As directed      Face to Face Visit Date: 8/1/2021    Follow-up provider for Plan of Care?: I treated the patient in an acute care facility and will not continue treatment after discharge.    Follow-up provider: LILY TORRES [[id number]]    Reason/Clinical Findings: esophogeal leak    Describe mobility limitations that make leaving home difficult: frequent tube feedings    Nursing/Therapeutic Services Requested: Skilled Nursing    Skilled nursing orders: Enteral therapy    Frequency: Other         Ambulatory Referral to Home Health   As directed      Face to Face Visit Date: 8/2/2021    Follow-up provider for Plan of Care?: I treated the patient in an acute care facility and will not continue treatment after discharge.    Follow-up provider: CHAGO VILLALBA [[id number]]    Reason/Clinical Findings: esophageal dysphagia    Describe mobility limitations that make leaving home difficult: esophageal dysphagia    Nursing/Therapeutic Services Requested: Skilled Nursing    Skilled nursing orders: Enteral therapy    Frequency: 1 Week 1         Discharge Follow-up with PCP   As directed       Currently Documented PCP:    Chago Villalba MD    PCP Phone Number:    [phone]     Follow Up Details: Please call to schedule a one week or earlist available follow-up appointment PCP; BP, glucose, BMP         XR Chest 2 View    Aug 15, 2021 (Approximate)      Exam reason: s/p esophageal stent placement           Time Spent on Discharge:  Greater than 30  minutes      ANKUSH Cleary  [facility]  08/02/21  10:47 EDT

## 2021-08-02 NOTE — DISCHARGE INSTRUCTIONS
HOLD glucotrol if blood sugars run consistently lower than 80.    KEEP the areas of skin around your chest tube and your j-tube clean and dry.  You may use a cloth and wash with soap and water then pat dry.  Do this daily and as needed.  Do not apply lotions, ointments, creams, or powders to the areas.  Wear clean pajamas and/or clothes daily.

## 2021-08-02 NOTE — DISCHARGE INSTR - ACTIVITY
Keep the chest tube site clean and dry.  Do not submerge in water-no bath, no swimming, no hot tub and no sauna.  Keep the dressing clean and dry.

## 2021-08-02 NOTE — PLAN OF CARE
Goal Outcome Evaluation:  Plan of Care Reviewed With: patient, daughter        Progress: improving  Outcome Summary: VSS.  NSR on monitor.  Barbara enteral feeds well.  Denies pain.  Instructed with teach back/return demonstration from patient and daughter how to use the j-tube and instill fluid and meds as well as flush afterward.  The infusion company is here now and teaching them how to use the equipment/enteral feeds.  After that, we will transport her to the [facility] for ride home.

## 2021-08-02 NOTE — PLAN OF CARE
Problem: Adult Inpatient Plan of Care  Goal: Plan of Care Review  Outcome: Ongoing, Progressing  Flowsheets (Taken 8/1/2021 2013)  Progress: improving  Plan of Care Reviewed With: patient  Outcome Summary: VSS. Patient denies pain, continues on Mini 500, continue to encourage pulmonary toliet and activity, Patient educated on tube feedings and medication administration verbal, visional and reading materials provided to patient. Possible D/C in a.m  Goal: Patient-Specific Goal (Individualized)  Outcome: Ongoing, Progressing  Goal: Absence of Hospital-Acquired Illness or Injury  Outcome: Ongoing, Progressing  Intervention: Identify and Manage Fall Risk  Recent Flowsheet Documentation  Taken 8/1/2021 1800 by Marilyn Gautam RN  Safety Promotion/Fall Prevention:   activity supervised   assistive device/personal items within reach   clutter free environment maintained   fall prevention program maintained   nonskid shoes/slippers when out of bed   safety round/check completed  Taken 8/1/2021 1600 by Marilyn Gautam RN  Safety Promotion/Fall Prevention:   clutter free environment maintained   activity supervised   assistive device/personal items within reach   fall prevention program maintained   nonskid shoes/slippers when out of bed   safety round/check completed  Taken 8/1/2021 1400 by Marilyn Gautam RN  Safety Promotion/Fall Prevention:   activity supervised   assistive device/personal items within reach   clutter free environment maintained   fall prevention program maintained   nonskid shoes/slippers when out of bed   safety round/check completed  Taken 8/1/2021 1200 by Marilyn Gautam RN  Safety Promotion/Fall Prevention:   assistive device/personal items within reach   activity supervised   clutter free environment maintained   fall prevention program maintained   nonskid shoes/slippers when out of bed  Taken 8/1/2021 1000 by Marilyn Gautam RN  Safety Promotion/Fall Prevention:   activity supervised    assistive device/personal items within reach   clutter free environment maintained   fall prevention program maintained   nonskid shoes/slippers when out of bed   safety round/check completed  Taken 8/1/2021 0800 by Marilyn Gautam RN  Safety Promotion/Fall Prevention:   activity supervised   assistive device/personal items within reach   clutter free environment maintained   fall prevention program maintained   nonskid shoes/slippers when out of bed   safety round/check completed  Intervention: Prevent and Manage VTE (venous thromboembolism) Risk  Recent Flowsheet Documentation  Taken 8/1/2021 1400 by Marilyn Gautam RN  VTE Prevention/Management:   bilateral   dorsiflexion/plantar flexion performed  Taken 8/1/2021 0800 by Marilyn Gautam RN  VTE Prevention/Management:   bilateral   dorsiflexion/plantar flexion performed  Intervention: Prevent Infection  Recent Flowsheet Documentation  Taken 8/1/2021 1800 by Marilyn Gautam RN  Infection Prevention: single patient room provided  Taken 8/1/2021 1600 by Marilyn Gautam RN  Infection Prevention: single patient room provided  Taken 8/1/2021 1400 by Marilyn Gautam RN  Infection Prevention: single patient room provided  Taken 8/1/2021 1200 by Marilyn Gautam RN  Infection Prevention: single patient room provided  Taken 8/1/2021 1000 by Marilyn Gautam RN  Infection Prevention: single patient room provided  Taken 8/1/2021 0800 by Marilyn Gautam RN  Infection Prevention: single patient room provided  Goal: Optimal Comfort and Wellbeing  Outcome: Ongoing, Progressing  Intervention: Provide Person-Centered Care  Recent Flowsheet Documentation  Taken 8/1/2021 1400 by Marilyn Gautam RN  Trust Relationship/Rapport:   care explained   choices provided  Taken 8/1/2021 0800 by Marilyn Gautam RN  Trust Relationship/Rapport:   care explained   choices provided  Goal: Readiness for Transition of Care  Outcome: Ongoing, Progressing     Problem: Swallowing  Impairment  Goal: Improved Swallowing Without Aspiration  Outcome: Ongoing, Progressing     Problem: Fall Injury Risk  Goal: Absence of Fall and Fall-Related Injury  Outcome: Ongoing, Progressing  Intervention: Identify and Manage Contributors to Fall Injury Risk  Recent Flowsheet Documentation  Taken 8/1/2021 1800 by Marilyn Gautam RN  Medication Review/Management: medications reviewed  Taken 8/1/2021 1600 by Marilyn Gautam RN  Medication Review/Management: medications reviewed  Taken 8/1/2021 1400 by Marilyn Gautam RN  Medication Review/Management: medications reviewed  Taken 8/1/2021 1200 by Marilyn Gautam RN  Medication Review/Management: medications reviewed  Taken 8/1/2021 1000 by Marilyn Gautam RN  Medication Review/Management: medications reviewed  Taken 8/1/2021 0800 by Marilyn Gautam RN  Medication Review/Management: medications reviewed  Intervention: Promote Injury-Free Environment  Recent Flowsheet Documentation  Taken 8/1/2021 1800 by Marilyn Gautam RN  Safety Promotion/Fall Prevention:   activity supervised   assistive device/personal items within reach   clutter free environment maintained   fall prevention program maintained   nonskid shoes/slippers when out of bed   safety round/check completed  Taken 8/1/2021 1600 by Marilyn Gautam RN  Safety Promotion/Fall Prevention:   clutter free environment maintained   activity supervised   assistive device/personal items within reach   fall prevention program maintained   nonskid shoes/slippers when out of bed   safety round/check completed  Taken 8/1/2021 1400 by Marilyn Gautam RN  Safety Promotion/Fall Prevention:   activity supervised   assistive device/personal items within reach   clutter free environment maintained   fall prevention program maintained   nonskid shoes/slippers when out of bed   safety round/check completed  Taken 8/1/2021 1200 by Marilyn Gautam RN  Safety Promotion/Fall Prevention:   assistive device/personal items  within reach   activity supervised   clutter free environment maintained   fall prevention program maintained   nonskid shoes/slippers when out of bed  Taken 8/1/2021 1000 by Marilyn Gauatm RN  Safety Promotion/Fall Prevention:   activity supervised   assistive device/personal items within reach   clutter free environment maintained   fall prevention program maintained   nonskid shoes/slippers when out of bed   safety round/check completed  Taken 8/1/2021 0800 by Marilyn Gautam RN  Safety Promotion/Fall Prevention:   activity supervised   assistive device/personal items within reach   clutter free environment maintained   fall prevention program maintained   nonskid shoes/slippers when out of bed   safety round/check completed   Goal Outcome Evaluation:  Plan of Care Reviewed With: patient        Progress: improving  Outcome Summary: VSS. Patient denies pain, continues on Mini 500, continue to encourage pulmonary toliet and activity, Patient educated on tube feedings and medication administration verbal, visional and reading materials provided to patient. Possible D/C in a.m

## 2021-08-02 NOTE — PLAN OF CARE
Goal Outcome Evaluation:  Plan of Care Reviewed With: patient        Progress: no change  Outcome Summary: vss, no complaints of pain. pt continues to have mini 500, have continued education on med admin per j tube, allowed pt to give meds herself . pt has had 2 episodes of incont when not able to get to [facility] in time. lab and cxr in am. will continue to monitor. poss d/c home

## 2021-08-02 NOTE — CONSULTS
Adult Nutrition  Assessment/PES    Patient Name:  Camilla Marcos  YOB: 1945  MRN: [id number]  Admit Date:  7/22/2021    Assessment Date:  8/2/2021    Comments:  Consult: TF change. MD requesting noctunral cyclic feedings prior to d/c. Currently tolerating PepAF through jtube at 55ml/hr with 25ml/hr flushes. MD recommending PepAF at 85ml/hr x16 hrs and 35ml/hr water flushes x 16hrs. Cyclic feedings will provide 1632kcal, 103g protein, and 1661ml fluids that will meet pt needs. Order placed to begin cyclic feedings. Will continue to follow for adequate nutrition.     Reason for Assessment     Row Name 08/02/21 1059          Reason for Assessment    Reason For Assessment  physician consult;TF/PN;follow-up protocol         Nutrition/Diet History     Row Name 08/02/21 1100          Nutrition/Diet History    Typical Food/Fluid Intake  TF change. MD requesting noctunral cyclic feedings prior to d/c. Currently tolerating PepAF throughn jtube.     Factors Affecting Nutritional Intake  difficulty/impaired swallowing           Labs/Tests/Procedures/Meds     Row Name 08/02/21 1104          Labs/Procedures/Meds    Lab Results Reviewed  reviewed, pertinent     Lab Results Comments  -242, Na, Cr, Hgb        Diagnostic Tests/Procedures    Diagnostic Test/Procedure Reviewed  reviewed        Medications    Pertinent Medications Reviewed  reviewed, pertinent     Pertinent Medications Comments  IV rocephin, glipizide, admelog, synthroid, iv flagyl, protonix         Physical Findings     Row Name 08/02/21 1105          Physical Findings    Overall Physical Appearance  other (see comments) room air     Gastrointestinal  feeding tube;other (see comments) Frequent stools and abdominal pain reported     Tubes  jejunostomy tube           Nutrition Prescription Ordered     Row Name 08/02/21 1107          Nutrition Prescription PO    Current PO Diet  NPO        Nutrition Prescription EN    Enteral Route  Jejunostomy      Product  Peptamen AF (Vital AF 1.2)     TF Delivery Method  Continuous     Continuous TF Goal Rate (mL/hr)  55 mL/hr     Continuous TF Current Rate (mL/hr)  55 mL/hr     Water flush (mL)   25 mL     Water Flush Frequency  Per hour         Evaluation of Received Nutrient/Fluid Intake     Row Name 08/02/21 1108          Calories Evaluation    Enteral Calories (kcal)  1584     % of Kcal Needs  94        Protein Evaluation    Enteral Protein (gm)  100     % of Protein Needs  136        Intake Assessment    Energy/Calorie Requirement Assessment  meeting needs     Protein Requirement Assessment  exceeds needs     Fluid Requirement Assessment  meeting needs     Tolerance  tolerating        Fluid Intake Evaluation    Enteral (Free Water) Fluid (mL)  1069     Free Water Flush Fluid (mL)  600     Total Free Water Intake (mL)  1669 mL               Problem/Interventions:          Intervention Goal     Row Name 08/02/21 1117          Intervention Goal    General  Maintain nutrition;Nutrition support treatment;Improved nutrition related lab(s);Meet nutritional needs for age/condition;Disease management/therapy     TF/PN  Adjust TF/PN;Tolerate TF at goal;Deliver (%) goal     Deliver % of Goal  100 %     Weight  Maintain weight         Nutrition Intervention     Row Name 08/02/21 1117          Nutrition Intervention    RD/Tech Action  Care plan reviewd;Follow Tx progress;Recommend/ordered     Recommended/Ordered  EN         Nutrition Prescription     Row Name 08/02/21 1117          Nutrition Prescription EN    Enteral Prescription  Enteral begin/change;Enteral to supply     Enteral Route  Jejunostomy     Product  Peptamen AF     TF Delivery Method  Cyclic     Cyclic TF Goal Rate (mL/hr)  85 mL/hr     Cyclic TF Starting Rate (mL/hr)  55 mL/hr     Cyclic TF Cycle Over (hrs)   16 hours (4p-8a)     Water flush (mL)   35 mL     Water Flush Frequency  Per hour     New EN Prescription Ordered?  Yes        EN to Supply    Kcal/Day   1632 Kcal/Day     Kcal/Kg  25 Kcal/Kg     Kcal/Kg Weight Method  Actual weight     Protein (gm/day)  103 gm/day     Meet Estimated Kcal Need (%)  96 %     Meet Estimated Protein Need (%)  140 %     TF Free H2O (mL)  1101 mL     Total Free H2O (mL/day)  1661 mL/day         Education/Evaluation     Row Name 08/02/21 1119          Education    Education  Will Instruct as appropriate        Monitor/Evaluation    Monitor  Pertinent labs;TF delivery/tolerance;Per protocol           Electronically signed by:  Melissa Cummins MS,RD,LD  08/02/21 11:20 EDT

## 2021-08-02 NOTE — DISCHARGE INSTR - DIET
Peptamen AF tube feeding.  Start at 4 pm and end at 8 am.  Start at 55 ml per hour and increase to goal rate of 85 ml per hour.  Water flush should be 35 ml per hour.  The home care nurse will help with setting this up and teaching you how to use the equipment.    Do not take anything by mouth.    All medications must be crushed and dissolved and given through the j-tube.  Never crush an extended release medication and put it in the tube.  Those are medications that have to be taken whole.  Always flush well after the medicine and after stopping the tube feeding.

## 2021-08-02 NOTE — PROGRESS NOTES
"  POST-OPERATIVE NOTE     Chief Complaint: Gastric perforation, follow-up  S/P: Esophagogastroduodenoscopy with stent placement  POD # 10  S/P: Open jejunostomy tube insertion  POD # 7    Subjective:  Symptoms:  Improved.  She reports weakness, anorexia and anxiety.  No shortness of breath or chest pain.    Diet:  NPO.  No nausea or vomiting.    Activity level: Impaired due to weakness.    Pain:  She complains of pain that is mild.  Pain is well controlled.    Feeling better today.  Eager to discharge home.    Objective:  General Appearance:  Comfortable, in no acute distress, ill-appearing and not in pain.    Vital signs: (most recent): Blood pressure 132/96, pulse 75, temperature 97.4 °F (36.3 °C), temperature source Oral, resp. rate 16, height 165.1 cm (65\"), weight 56.4 kg (124 lb 6.4 oz), SpO2 98 %.  Vital signs are normal.  No fever.    Output: Producing urine and producing stool.    HEENT: Normal HEENT exam.    Lungs:  Normal effort and normal respiratory rate.  Breath sounds clear to auscultation.  She is not in respiratory distress.  No rales, wheezes or rhonchi.    Heart: Normal rate.  Regular rhythm.    Chest: No chest wall tenderness.    Abdomen: Abdomen is soft and non-distended.  Bowel sounds are normal.   There is no abdominal tenderness.   There is no mass.   Extremities: Normal range of motion.    Pulses: Distal pulses are intact.    Neurological: Patient is alert and oriented to person, place and time.    Skin:  Warm and dry.            Chest tube:   Site: Right, Clean, Dry, Intact and Securement device intact  Suction: waterseal  Air Leak: negative  24 Hour Total: 0cc    Results Review:     I reviewed the patient's new clinical results.  I reviewed the patient's new imaging results and agree with the interpretation.  I reviewed the patient's other test results and agree with the interpretation    Assessment/Plan     Gastric perforation: Patient is s/p EGD with stent placement and J-tube placement. "  Reviewed this morning's chest x-ray which is stable.  No effusion or acute pulmonary process.  Chest tube in stable position.  Chest tube has been placed to mini 500 for discharge.    Patient to remain n.p.o. upon discharge. All home medications to be administered via J-tube.  Appreciate assistance from nursing staff with education regarding medication and tube feed administration via J-tube.  We will allow patient to transition to cyclic feeds with discharge.    ID has recommended moxifloxacin 100 mg per J-tube every 24 hours for an additional 2 weeks upon discharge.     Disposition: Plan for discharge home today. Dr. Bettencourt follow up with her in our [place] office on 8/16/21.    ANKUSH Gonzalez  [facility]  08/02/21  10:42 EDT    Patient was seen and assessed while wearing personal protective equipment including facemask, protective eyewear and gloves.  Hand hygiene performed prior to entering the room and upon exiting with doffing of gloves.

## 2021-08-03 ENCOUNTER — TELEPHONE (OUTPATIENT)
Dept: SURGERY | Facility: CLINIC | Age: 76
End: 2021-08-03

## 2021-08-03 ENCOUNTER — READMISSION MANAGEMENT (OUTPATIENT)
Dept: CALL CENTER | Facility: HOSPITAL | Age: 76
End: 2021-08-03

## 2021-08-03 NOTE — OUTREACH NOTE
Prep Survey      Responses   [facility] facility patient discharged from?  [place]   Is LACE score < 7 ?  No   Emergency Room discharge w/ pulse ox?  No   Eligibility  Readm Mgmt   Discharge diagnosis   J-tube placement   Does the patient have one of the following disease processes/diagnoses(primary or secondary)?  General Surgery   Does the patient have Home health ordered?  Yes   What is the Home health agency?    [facility]    Is there a DME ordered?  No   Prep survey completed?  Yes          Clover Woodard RN

## 2021-08-03 NOTE — CASE MANAGEMENT/SOCIAL WORK
Case Management Discharge Note      Final Note: Pt discharged home with [facility] and [facility] infsusion for tube feeds.  ADDISON Carmona RN    Provided Post Acute Provider List?: Yes  Post Acute Provider List: Home Health  Provided Post Acute Provider Quality & Resource List?: Yes  Post Acute Provider Quality and Resource List: Home Health  Delivered To: Patient  Method of Delivery: In person    Selected Continued Care - Discharged on 8/2/2021 Admission date: 7/22/2021 - Discharge disposition: Home-Health Care Svc    Destination    No services have been selected for the patient.              Durable Medical Equipment    No services have been selected for the patient.              Dialysis/Infusion Coordination complete.    Service Provider Selected Services Address Phone Fax Patient Preferred    [facility] INFUSION  [facility] and IV Therapy [address] [facility] [address] [phone] --          Home Medical Care Coordination complete.    Service Provider Selected Services Address Phone Fax Patient Preferred    [facility] HOME HEALTH [facility]  Home Health Services [address] [address] [phone] --          Therapy    No services have been selected for the patient.              Community Resources    No services have been selected for the patient.              Community & DME    No services have been selected for the patient.                       Final Discharge Disposition Code: 06 - home with home health care

## 2021-08-03 NOTE — TELEPHONE ENCOUNTER
PATIENT WAS TOLD NOT TO TAKE ANYTHING BY MOUTH AFTER SURGERY. SCOUT WITH [facility] WOULD LIKE TO KNOW WHEN SHE MAY BEGIN WORKING WITH HER ON HER SWALLOWING EVEN IF ONLY WITH WATER.    THANK YOU

## 2021-08-06 ENCOUNTER — READMISSION MANAGEMENT (OUTPATIENT)
Dept: CALL CENTER | Facility: HOSPITAL | Age: 76
End: 2021-08-06

## 2021-08-06 NOTE — OUTREACH NOTE
General Surgery Week 1 Survey      Responses   [facility] patient discharged from?  [place]   Does the patient have one of the following disease processes/diagnoses(primary or secondary)?  General Surgery   Week 1 attempt successful?  No   Unsuccessful attempts  Attempt 1          Maribel Neal RN

## 2021-08-08 ENCOUNTER — READMISSION MANAGEMENT (OUTPATIENT)
Dept: CALL CENTER | Facility: HOSPITAL | Age: 76
End: 2021-08-08

## 2021-08-08 NOTE — OUTREACH NOTE
General Surgery Week 1 Survey      Responses   [facility] patient discharged from?  [place]   Does the patient have one of the following disease processes/diagnoses(primary or secondary)?  General Surgery   Week 1 attempt successful?  Yes   Call start time  1048   Call end time  1053   Is patient permission given to speak with other caregiver?  Yes   List who call center can speak with  Jennifer Huston reviewed with patient/caregiver?  Yes   Is the patient having any side effects they believe may be caused by any medication additions or changes?  No   Does the patient have all medications related to this admission filled (includes all antibiotics, pain medications, etc.)  Yes   Is the patient taking all medications as directed (includes completed medication regime)?  Yes   Does the patient have a follow up appointment scheduled with their surgeon?  Yes   Has the patient kept scheduled appointments due by today?  Yes   Comments  PCP appt. tomorrow 08/09/2021   What is the Home health agency?    [facility]    Has all DME been delivered?  Yes   Psychosocial issues?  No   Comments  has feeding tube, HH comes   Did the patient receive a copy of their discharge instructions?  Yes   Nursing interventions  Reviewed instructions with patient, Educated on MyChart   Nursing interventions  Nurse provided patient education   Is the patient /caregiver able to teach back basic post-op care?  Continue use of incentive spirometry at least 1 week post discharge, Practice 'cough and deep breath', Drive as instructed by MD in discharge instructions, No tub bath, swimming, or hot tub until instructed by MD, Keep incision areas clean,dry and protected, Do not remove steri-strips, Lifting as instructed by MD in discharge instructions   Is the patient/caregiver able to teach back signs and symptoms of incisional infection?  Increased redness, swelling or pain at the incisonal site, Increased drainage or bleeding   Is the  patient/caregiver able to teach back steps to recovery at home?  Set small, achievable goals for return to baseline health, Practice good oral hygiene   If the patient is a current smoker, are they able to teach back resources for cessation?  Not a smoker   Is the patient/caregiver able to teach back the hierarchy of who to call/visit for symptoms/problems? PCP, Specialist, Home health nurse, Urgent Care, ED, 911  Yes   Week 1 call completed?  Yes   Wrap up additional comments  She is doing, ok, tolerating things, fatigue, has to push pole down [place], tires her, sleeps well          Ashwini Bey, RN

## 2021-08-12 ENCOUNTER — TELEPHONE (OUTPATIENT)
Dept: SURGERY | Facility: CLINIC | Age: 76
End: 2021-08-12

## 2021-08-12 NOTE — TELEPHONE ENCOUNTER
Patient called very concerned stating she coughed up blood. It was the size of a quarter and looks like dark red blood. Not bright red. She is currently on antibiotics. She has no signs of infection. She recently had bronch with esophageal perf and has a stent in place. He wants to know if her stent moved or fell out of place and caused bleeding. She is terribly concerned. She does see dr. landry today (oncology)

## 2021-08-16 ENCOUNTER — OFFICE VISIT (OUTPATIENT)
Dept: SURGERY | Facility: CLINIC | Age: 76
End: 2021-08-16

## 2021-08-16 ENCOUNTER — HOSPITAL ENCOUNTER (OUTPATIENT)
Dept: CT IMAGING | Facility: HOSPITAL | Age: 76
Discharge: HOME | End: 2021-08-16
Admitting: THORACIC SURGERY (CARDIOTHORACIC VASCULAR SURGERY)

## 2021-08-16 VITALS
HEART RATE: 78 BPM | HEIGHT: 65 IN | BODY MASS INDEX: 20.7 KG/M2 | SYSTOLIC BLOOD PRESSURE: 128 MMHG | DIASTOLIC BLOOD PRESSURE: 72 MMHG | OXYGEN SATURATION: 99 %

## 2021-08-16 DIAGNOSIS — C49.A1 GASTROINTESTINAL STROMAL TUMOR (GIST) OF ESOPHAGUS (HCC): ICD-10-CM

## 2021-08-16 DIAGNOSIS — R79.1 ABNORMAL COAGULATION PROFILE: ICD-10-CM

## 2021-08-16 DIAGNOSIS — K31.6 GASTRIC FISTULA: Primary | ICD-10-CM

## 2021-08-16 PROCEDURE — 99215 OFFICE O/P EST HI 40 MIN: CPT | Performed by: THORACIC SURGERY (CARDIOTHORACIC VASCULAR SURGERY)

## 2021-08-16 PROCEDURE — 71250 CT THORAX DX C-: CPT

## 2021-08-16 NOTE — PROGRESS NOTES
"Chief Complaint  Gastrointestinal stromal tumor, gastropleural fistula  Subjective          Camilla Robe Marcos presents to [facility] THORACIC SURGERY in follow-up.  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  She received a covered stent and a chest tube in the pleural space with hopes that this will scar in.  She is n.p.o. and receiving all her nutrition through feeding jejunostomy.  She has had minimal out of her chest tube since her discharge from the hospital.  Objective   Vital Signs:   /72 (BP Location: Right arm, Patient Position: Sitting, Cuff Size: Adult)   Pulse 78   Ht 165.1 cm (65\")   SpO2 99%   BMI 20.70 kg/m²     Physical Exam  Vitals and nursing note reviewed.   Constitutional:       Appearance: She is well-developed.   HENT:      Head: Normocephalic and atraumatic.      Nose: Nose normal.   Eyes:      Conjunctiva/sclera: Conjunctivae normal.   Cardiovascular:      Rate and Rhythm: Normal rate.   Pulmonary:      Effort: Pulmonary effort is normal.   Abdominal:      Palpations: Abdomen is soft.   Musculoskeletal:      Cervical back: Neck supple.   Skin:     General: Skin is warm and dry.   Neurological:      Mental Status: She is alert and oriented to person, place, and time.   Psychiatric:         Behavior: Behavior normal.         Thought Content: Thought content normal.         Judgment: Judgment normal.        Result Review :       Data reviewed: Radiologic studies :     I have independently reviewed the chest x-ray performed today which demonstrates good expansion bilateral lungs, no pneumothorax.     Assessment and Plan      Ms. Marcos is a very pleasant 76-year-old lady status post covered esophageal stent, feeding jejunostomy and chest tube placement for conservative management of her " gastropleural fistula.  I suspect that she has had this fistula for some time given her admission in December with the same issue.  This is a difficult thing to fix as this is in her gastric conduit and I think our best option would be management with the stent.  She will need a repeat EGD for stent removal and evaluation of her gastric injury.  We will plan this for 4 weeks.    Her chest tube was removed today in the office without difficulty.  Diagnoses and all orders for this visit:    1. Gastric fistula (Primary)  -     Case Request; Standing  -     CBC and Differential; Future  -     Basic metabolic panel; Future  -     APTT; Future  -     Protime-INR; Future  -     Case Request    2. Abnormal coagulation profile   -     APTT; Future  -     Protime-INR; Future    Other orders  -     Obtain informed consent  -     Provide NPO Instructions to Patient; Future  -     Chlorhexidine Skin Prep; Future  -     Verify NPO Status; Standing  -     SCD (sequential compression device)- to be placed on patient in Pre-op; Standing  -     Verify / Perform Chlorhexidine Skin Prep; Standing  -     Verify / Perform Chlorhexidine Skin Prep if Indicated (If Not Already Completed); Standing      I spent 40 minutes caring for Camilla on this date of service. This time includes time spent by me in the following activities:preparing for the visit, reviewing tests, obtaining and/or reviewing a separately obtained history, performing a medically appropriate examination and/or evaluation , counseling and educating the patient/family/caregiver, ordering medications, tests, or procedures, referring and communicating with other health care professionals  and independently interpreting results and communicating that information with the patient/family/caregiver  Follow Up   No follow-ups on file.  Patient was given instructions and counseling regarding her condition or for health maintenance advice. Please see specific information pulled into  the AVS if appropriate.

## 2021-08-17 ENCOUNTER — READMISSION MANAGEMENT (OUTPATIENT)
Dept: CALL CENTER | Facility: HOSPITAL | Age: 76
End: 2021-08-17

## 2021-08-17 PROBLEM — K31.6 GASTRIC FISTULA: Status: ACTIVE | Noted: 2021-08-17

## 2021-08-17 NOTE — OUTREACH NOTE
General Surgery Week 2 Survey      Responses   [facility], [place], operated by [facility] patient discharged from?  [place]   Does the patient have one of the following disease processes/diagnoses(primary or secondary)?  General Surgery   Week 2 attempt successful?  No   Unsuccessful attempts  Attempt 1          Winifred Morris RN

## 2021-08-20 ENCOUNTER — READMISSION MANAGEMENT (OUTPATIENT)
Dept: CALL CENTER | Facility: HOSPITAL | Age: 76
End: 2021-08-20

## 2021-08-20 NOTE — OUTREACH NOTE
General Surgery Week 2 Survey      Responses   [facility] patient discharged from?  [place]   Does the patient have one of the following disease processes/diagnoses(primary or secondary)?  General Surgery   Week 2 attempt successful?  Yes   Call start time  1053   Call end time  1054   Discharge diagnosis   J-tube placement   List who call center can speak with  DaughterJennifer reviewed with patient/caregiver?  Yes   Is the patient taking all medications as directed (includes completed medication regime)?  Yes   Has the patient kept scheduled appointments due by today?  Yes   What is the Home health agency?    [facility]     What is the patient's perception of their health status since discharge?  Improving   Additional teach back comments  Tolerating tube feedings, unable to swallow   Week 2 call completed?  Yes          Citlaly Fabian RN

## 2021-08-23 ENCOUNTER — APPOINTMENT (OUTPATIENT)
Dept: PREADMISSION TESTING | Facility: HOSPITAL | Age: 76
End: 2021-08-23

## 2021-08-29 ENCOUNTER — READMISSION MANAGEMENT (OUTPATIENT)
Dept: CALL CENTER | Facility: HOSPITAL | Age: 76
End: 2021-08-29

## 2021-08-29 NOTE — OUTREACH NOTE
General Surgery Week 3 Survey      Responses   [facility] patient discharged from?  [place]   Does the patient have one of the following disease processes/diagnoses(primary or secondary)?  General Surgery   Week 3 attempt successful?  No   Unsuccessful attempts  Attempt 1          Ashwini Bey RN

## 2021-09-01 ENCOUNTER — PRE-ADMISSION TESTING (OUTPATIENT)
Dept: PREADMISSION TESTING | Facility: HOSPITAL | Age: 76
End: 2021-09-01

## 2021-09-01 ENCOUNTER — READMISSION MANAGEMENT (OUTPATIENT)
Dept: CALL CENTER | Facility: HOSPITAL | Age: 76
End: 2021-09-01

## 2021-09-01 VITALS
BODY MASS INDEX: 18.83 KG/M2 | OXYGEN SATURATION: 99 % | HEART RATE: 67 BPM | TEMPERATURE: 98.1 F | RESPIRATION RATE: 20 BRPM | WEIGHT: 120 LBS | DIASTOLIC BLOOD PRESSURE: 92 MMHG | HEIGHT: 67 IN | SYSTOLIC BLOOD PRESSURE: 135 MMHG

## 2021-09-01 LAB — SARS-COV-2 ORF1AB RESP QL NAA+PROBE: DETECTED

## 2021-09-01 PROCEDURE — 85025 COMPLETE CBC W/AUTO DIFF WBC: CPT | Performed by: THORACIC SURGERY (CARDIOTHORACIC VASCULAR SURGERY)

## 2021-09-01 PROCEDURE — C9803 HOPD COVID-19 SPEC COLLECT: HCPCS

## 2021-09-01 PROCEDURE — 80048 BASIC METABOLIC PNL TOTAL CA: CPT | Performed by: THORACIC SURGERY (CARDIOTHORACIC VASCULAR SURGERY)

## 2021-09-01 PROCEDURE — U0004 COV-19 TEST NON-CDC HGH THRU: HCPCS

## 2021-09-01 PROCEDURE — U0005 INFEC AGEN DETEC AMPLI PROBE: HCPCS

## 2021-09-01 PROCEDURE — 85730 THROMBOPLASTIN TIME PARTIAL: CPT | Performed by: THORACIC SURGERY (CARDIOTHORACIC VASCULAR SURGERY)

## 2021-09-01 PROCEDURE — 85610 PROTHROMBIN TIME: CPT | Performed by: THORACIC SURGERY (CARDIOTHORACIC VASCULAR SURGERY)

## 2021-09-01 RX ORDER — CHLORHEXIDINE GLUCONATE 500 MG/1
1 CLOTH TOPICAL ONCE
Status: ON HOLD | COMMUNITY
End: 2022-04-27

## 2021-09-01 NOTE — DISCHARGE INSTRUCTIONS
Take the following medications the morning of surgery:    Levothyroxine   metoprolol    If you are on prescription narcotic pain medication to control your pain you may also take that medication the morning of surgery.    General Instructions:  • Do not eat solid food after midnight the night before surgery.  • You may drink clear liquids day of surgery but must stop at least one hour before your hospital arrival time.  • It is beneficial for you to have a clear drink that contains carbohydrates the day of surgery.  We suggest a 12 to 20 ounce bottle of Gatorade or Powerade for non-diabetic patients or a 12 to 20 ounce bottle of G2 or Powerade Zero for diabetic patients. (Pediatric patients, are not advised to drink a 12 to 20 ounce carbohydrate drink)    Clear liquids are liquids you can see through.  Nothing red in color.     Plain water                               Sports drinks  Sodas                                   Gelatin (Jell-O)  Fruit juices without pulp such as white grape juice and apple juice  Popsicles that contain no fruit or yogurt  Tea or coffee (no cream or milk added)  Gatorade / Powerade  G2 / Powerade Zero    • Infants may have breast milk up to four hours before surgery.  • Infants drinking formula may drink formula up to six hours before surgery.   • Patients who avoid smoking, chewing tobacco and alcohol for 4 weeks prior to surgery have a reduced risk of post-operative complications.  Quit smoking as many days before surgery as you can.  • Do not smoke, use chewing tobacco or drink alcohol the day of surgery.   • If applicable bring your C-PAP/ BI-PAP machine.  • Bring any papers given to you in the doctor’s office.  • Wear clean comfortable clothes.  • Do not wear contact lenses, false eyelashes or make-up.  Bring a case for your glasses.   • Bring crutches or walker if applicable.  • Remove all piercings.  Leave jewelry and any other valuables at home.  • Hair extensions with metal clips  must be removed prior to surgery.  • The Pre-Admission Testing nurse will instruct you to bring medications if unable to obtain an accurate list in Pre-Admission Testing.        If you were given a blood bank ID arm band remember to bring it with you the day of surgery.    Preventing a Surgical Site Infection:  • For 2 to 3 days before surgery, avoid shaving with a razor because the razor can irritate skin and make it easier to develop an infection.    • Any areas of open skin can increase the risk of a post-operative wound infection by allowing bacteria to enter and travel throughout the body.  Notify your surgeon if you have any skin wounds / rashes even if it is not near the expected surgical site.  The area will need assessed to determine if surgery should be delayed until it is healed.  • The night prior to surgery shower using a fresh bar of anti-bacterial soap (such as Dial) and clean washcloth.  Sleep in a clean bed with clean clothing.  Do not allow pets to sleep with you.  • Shower on the morning of surgery using a fresh bar of anti-bacterial soap (such as Dial) and clean washcloth.  Dry with a clean towel and dress in clean clothing.  • Ask your surgeon if you will be receiving antibiotics prior to surgery.  • Make sure you, your family, and all healthcare providers clean their hands with soap and water or an alcohol based hand  before caring for you or your wound.    Day of surgery:9/3/2021   1030  Your arrival time is approximately two hours before your scheduled surgery time.  Upon arrival, a Pre-op nurse and Anesthesiologist will review your health history, obtain vital signs, and answer questions you may have.  The only belongings needed at this time will be a list of your home medications and if applicable your C-PAP/BI-PAP machine.  A Pre-op nurse will start an IV and you may receive medication in preparation for surgery, including something to help you relax.     Please be aware that  surgery does come with discomfort.  We want to make every effort to control your discomfort so please discuss any uncontrolled symptoms with your nurse.   Your doctor will most likely have prescribed pain medications.      If you are going home after surgery you will receive individualized written care instructions before being discharged.  A responsible adult must drive you to and from the hospital on the day of your surgery and stay with you for 24 hours.  Discharge prescriptions can be filled by the hospital pharmacy during regular pharmacy hours.  If you are having surgery late in the day/evening your prescription may be e-prescribed to your pharmacy.  Please verify your pharmacy hours or chose a 24 hour pharmacy to avoid not having access to your prescription because your pharmacy has closed for the day.    If you are staying overnight following surgery, you will be transported to your hospital room following the recovery period.   has all private rooms.    If you have any questions please call Pre-Admission Testing at [phone].  Deductibles and co-payments are collected on the day of service. Please be prepared to pay the required co-pay, deductible or deposit on the day of service as defined by your plan.    Patient Education for Self-Quarantine Process    • Following your COVID testing, we strongly recommend that you wear a mask when you are with other people and practice social distancing.   • Limit your activities to only required outings.  • Wash your hands with soap and water frequently for at least 20 seconds.   • Avoid touching your eyes, nose and mouth with unwashed hands.  • Do not share anything - utensils, drinking glasses, food from the same bowl.   • Sanitize household surfaces daily. Include all high touch areas (door handles, light switches, phones, countertops, etc.)    Call your surgeon immediately if you experience any of the following symptoms:  • Sore  Throat  • Shortness of Breath or difficulty breathing  • Cough  • Chills  • Body soreness or muscle pain  • Headache  • Fever  • New loss of taste or smell  • Do not arrive for your surgery ill.  Your procedure will need to be rescheduled to another time.  You will need to call your physician before the day of surgery to avoid any unnecessary exposure to hospital staff as well as other patients.    CHLORHEXIDINE CLOTH INSTRUCTIONS  The morning of surgery follow these instructions using the Chlorhexidine cloths you've been given.  These steps reduce bacteria on the body.  Do not use the cloths near your eyes, ears mouth, genitalia or on open wounds.  Throw the cloths away after use but do not try to flush them down a toilet.      • Open and remove one cloth at a time from the package.    • Leave the cloth unfolded and begin the bathing.  • Massage the skin with the cloths using gentle pressure to remove bacteria.  Do not scrub harshly.   • Follow the steps below with one 2% CHG cloth per area (6 total cloths).  • One cloth for neck, shoulders and chest.  • One cloth for both arms, hands, fingers and underarms (do underarms last).  • One cloth for the abdomen followed by groin.  • One cloth for right leg and foot including between the toes.  • One cloth for left leg and foot including between the toes.  • The last cloth is to be used for the back of the neck, back and buttocks.    Allow the CHG to air dry 3 minutes on the skin which will give it time to work and decrease the chance of irritation.  The skin may feel sticky until it is dry.  Do not rinse with water or any other liquid or you will lose the beneficial effects of the CHG.  If mild skin irritation occurs, do rinse the skin to remove the CHG.  Report this to the nurse at time of admission.  Do not apply lotions, creams, ointments, deodorants or perfumes after using the clothes. Dress in clean clothes before coming to the hospital.

## 2021-09-01 NOTE — OUTREACH NOTE
General Surgery Week 3 Survey      Responses   [facility] patient discharged from?  [place]   Does the patient have one of the following disease processes/diagnoses(primary or secondary)?  General Surgery   Week 3 attempt successful?  No   Unsuccessful attempts  Attempt 2          Ayde Vega LPN

## 2021-09-09 ENCOUNTER — LAB (OUTPATIENT)
Dept: LAB | Facility: HOSPITAL | Age: 76
End: 2021-09-09

## 2021-09-09 ENCOUNTER — TRANSCRIBE ORDERS (OUTPATIENT)
Dept: LAB | Facility: HOSPITAL | Age: 76
End: 2021-09-09

## 2021-09-09 DIAGNOSIS — Z01.818 PRE-OP TESTING: ICD-10-CM

## 2021-09-09 DIAGNOSIS — Z01.818 PRE-OP TESTING: Primary | ICD-10-CM

## 2021-09-09 PROCEDURE — U0004 COV-19 TEST NON-CDC HGH THRU: HCPCS

## 2021-09-09 PROCEDURE — C9803 HOPD COVID-19 SPEC COLLECT: HCPCS

## 2021-09-09 PROCEDURE — U0005 INFEC AGEN DETEC AMPLI PROBE: HCPCS

## 2021-09-10 LAB — SARS-COV-2 RNA NOSE QL NAA+PROBE: NORMAL

## 2021-09-16 ENCOUNTER — TRANSCRIBE ORDERS (OUTPATIENT)
Dept: LAB | Facility: HOSPITAL | Age: 76
End: 2021-09-16

## 2021-09-16 ENCOUNTER — LAB (OUTPATIENT)
Dept: LAB | Facility: HOSPITAL | Age: 76
End: 2021-09-16

## 2021-09-16 DIAGNOSIS — Z00.00 ROUTINE GENERAL MEDICAL EXAMINATION AT A HEALTH CARE FACILITY: Primary | ICD-10-CM

## 2021-09-16 DIAGNOSIS — Z00.00 ROUTINE GENERAL MEDICAL EXAMINATION AT A HEALTH CARE FACILITY: ICD-10-CM

## 2021-09-16 PROCEDURE — U0004 COV-19 TEST NON-CDC HGH THRU: HCPCS

## 2021-09-16 PROCEDURE — U0005 INFEC AGEN DETEC AMPLI PROBE: HCPCS

## 2021-09-17 LAB — SARS-COV-2 RNA NOSE QL NAA+PROBE: NOT DETECTED

## 2021-09-20 ENCOUNTER — TRANSCRIBE ORDERS (OUTPATIENT)
Dept: SLEEP MEDICINE | Facility: HOSPITAL | Age: 76
End: 2021-09-20

## 2021-09-20 DIAGNOSIS — Z01.818 OTHER SPECIFIED PRE-OPERATIVE EXAMINATION: Primary | ICD-10-CM

## 2021-09-23 ENCOUNTER — TRANSCRIBE ORDERS (OUTPATIENT)
Dept: ADMINISTRATIVE | Facility: HOSPITAL | Age: 76
End: 2021-09-23

## 2021-09-23 DIAGNOSIS — Z12.31 SCREENING MAMMOGRAM, ENCOUNTER FOR: Primary | ICD-10-CM

## 2021-10-04 ENCOUNTER — LAB (OUTPATIENT)
Dept: LAB | Facility: HOSPITAL | Age: 76
End: 2021-10-04

## 2021-10-04 DIAGNOSIS — Z01.818 OTHER SPECIFIED PRE-OPERATIVE EXAMINATION: ICD-10-CM

## 2021-10-04 PROCEDURE — C9803 HOPD COVID-19 SPEC COLLECT: HCPCS

## 2021-10-04 PROCEDURE — U0005 INFEC AGEN DETEC AMPLI PROBE: HCPCS

## 2021-10-04 PROCEDURE — U0004 COV-19 TEST NON-CDC HGH THRU: HCPCS

## 2021-10-05 LAB — SARS-COV-2 RNA NOSE QL NAA+PROBE: NOT DETECTED

## 2021-10-06 ENCOUNTER — ANESTHESIA EVENT (OUTPATIENT)
Dept: PERIOP | Facility: HOSPITAL | Age: 76
End: 2021-10-06

## 2021-10-06 ENCOUNTER — HOSPITAL ENCOUNTER (OUTPATIENT)
Facility: HOSPITAL | Age: 76
Setting detail: HOSPITAL OUTPATIENT SURGERY
Discharge: HOME | End: 2021-10-06
Attending: THORACIC SURGERY (CARDIOTHORACIC VASCULAR SURGERY) | Admitting: THORACIC SURGERY (CARDIOTHORACIC VASCULAR SURGERY)

## 2021-10-06 ENCOUNTER — APPOINTMENT (OUTPATIENT)
Dept: GENERAL RADIOLOGY | Facility: HOSPITAL | Age: 76
End: 2021-10-06

## 2021-10-06 ENCOUNTER — ANESTHESIA (OUTPATIENT)
Dept: PERIOP | Facility: HOSPITAL | Age: 76
End: 2021-10-06

## 2021-10-06 VITALS
TEMPERATURE: 97.5 F | DIASTOLIC BLOOD PRESSURE: 80 MMHG | BODY MASS INDEX: 18.99 KG/M2 | SYSTOLIC BLOOD PRESSURE: 167 MMHG | WEIGHT: 121 LBS | RESPIRATION RATE: 16 BRPM | HEART RATE: 65 BPM | OXYGEN SATURATION: 98 % | HEIGHT: 67 IN

## 2021-10-06 LAB
GLUCOSE BLDC GLUCOMTR-MCNC: 114 MG/DL (ref 70–130)
GLUCOSE BLDC GLUCOMTR-MCNC: 91 MG/DL (ref 70–130)

## 2021-10-06 PROCEDURE — 43247 EGD REMOVE FOREIGN BODY: CPT | Performed by: THORACIC SURGERY (CARDIOTHORACIC VASCULAR SURGERY)

## 2021-10-06 PROCEDURE — C1876 STENT, NON-COA/NON-COV W/DEL: HCPCS | Performed by: THORACIC SURGERY (CARDIOTHORACIC VASCULAR SURGERY)

## 2021-10-06 PROCEDURE — 43266 EGD ENDOSCOPIC STENT PLACE: CPT | Performed by: THORACIC SURGERY (CARDIOTHORACIC VASCULAR SURGERY)

## 2021-10-06 PROCEDURE — 82962 GLUCOSE BLOOD TEST: CPT

## 2021-10-06 PROCEDURE — 25010000002 ONDANSETRON PER 1 MG: Performed by: NURSE ANESTHETIST, CERTIFIED REGISTERED

## 2021-10-06 PROCEDURE — 25010000002 SUCCINYLCHOLINE PER 20 MG: Performed by: NURSE ANESTHETIST, CERTIFIED REGISTERED

## 2021-10-06 PROCEDURE — 25010000002 DEXAMETHASONE PER 1 MG: Performed by: NURSE ANESTHETIST, CERTIFIED REGISTERED

## 2021-10-06 PROCEDURE — 71045 X-RAY EXAM CHEST 1 VIEW: CPT | Performed by: THORACIC SURGERY (CARDIOTHORACIC VASCULAR SURGERY)

## 2021-10-06 PROCEDURE — 71045 X-RAY EXAM CHEST 1 VIEW: CPT

## 2021-10-06 PROCEDURE — 25010000002 PROPOFOL 10 MG/ML EMULSION: Performed by: NURSE ANESTHETIST, CERTIFIED REGISTERED

## 2021-10-06 PROCEDURE — S0260 H&P FOR SURGERY: HCPCS | Performed by: THORACIC SURGERY (CARDIOTHORACIC VASCULAR SURGERY)

## 2021-10-06 PROCEDURE — C1769 GUIDE WIRE: HCPCS | Performed by: THORACIC SURGERY (CARDIOTHORACIC VASCULAR SURGERY)

## 2021-10-06 PROCEDURE — C1713 ANCHOR/SCREW BN/BN,TIS/BN: HCPCS | Performed by: THORACIC SURGERY (CARDIOTHORACIC VASCULAR SURGERY)

## 2021-10-06 PROCEDURE — 76000 FLUOROSCOPY <1 HR PHYS/QHP: CPT | Performed by: THORACIC SURGERY (CARDIOTHORACIC VASCULAR SURGERY)

## 2021-10-06 DEVICE — STNT ESOPH EVOLUTION DS FULL CVR 18X23MM 10CM: Type: IMPLANTABLE DEVICE | Site: ESOPHAGUS | Status: FUNCTIONAL

## 2021-10-06 RX ORDER — SODIUM CHLORIDE, SODIUM LACTATE, POTASSIUM CHLORIDE, CALCIUM CHLORIDE 600; 310; 30; 20 MG/100ML; MG/100ML; MG/100ML; MG/100ML
9 INJECTION, SOLUTION INTRAVENOUS CONTINUOUS PRN
Status: DISCONTINUED | OUTPATIENT
Start: 2021-10-06 | End: 2021-10-06 | Stop reason: HOSPADM

## 2021-10-06 RX ORDER — PROMETHAZINE HYDROCHLORIDE 25 MG/1
25 TABLET ORAL ONCE AS NEEDED
Status: DISCONTINUED | OUTPATIENT
Start: 2021-10-06 | End: 2021-10-06 | Stop reason: HOSPADM

## 2021-10-06 RX ORDER — DIPHENHYDRAMINE HCL 25 MG
25 CAPSULE ORAL
Status: DISCONTINUED | OUTPATIENT
Start: 2021-10-06 | End: 2021-10-06 | Stop reason: HOSPADM

## 2021-10-06 RX ORDER — EPHEDRINE SULFATE 50 MG/ML
5 INJECTION, SOLUTION INTRAVENOUS ONCE AS NEEDED
Status: DISCONTINUED | OUTPATIENT
Start: 2021-10-06 | End: 2021-10-06 | Stop reason: HOSPADM

## 2021-10-06 RX ORDER — ROCURONIUM BROMIDE 10 MG/ML
INJECTION, SOLUTION INTRAVENOUS AS NEEDED
Status: DISCONTINUED | OUTPATIENT
Start: 2021-10-06 | End: 2021-10-06 | Stop reason: SURG

## 2021-10-06 RX ORDER — HYDROMORPHONE HYDROCHLORIDE 1 MG/ML
0.5 INJECTION, SOLUTION INTRAMUSCULAR; INTRAVENOUS; SUBCUTANEOUS
Status: DISCONTINUED | OUTPATIENT
Start: 2021-10-06 | End: 2021-10-06 | Stop reason: HOSPADM

## 2021-10-06 RX ORDER — SODIUM CHLORIDE 0.9 % (FLUSH) 0.9 %
10 SYRINGE (ML) INJECTION EVERY 12 HOURS SCHEDULED
Status: DISCONTINUED | OUTPATIENT
Start: 2021-10-06 | End: 2021-10-06 | Stop reason: HOSPADM

## 2021-10-06 RX ORDER — FLUMAZENIL 0.1 MG/ML
0.2 INJECTION INTRAVENOUS AS NEEDED
Status: DISCONTINUED | OUTPATIENT
Start: 2021-10-06 | End: 2021-10-06 | Stop reason: HOSPADM

## 2021-10-06 RX ORDER — FAMOTIDINE 10 MG/ML
20 INJECTION, SOLUTION INTRAVENOUS
Status: COMPLETED | OUTPATIENT
Start: 2021-10-06 | End: 2021-10-06

## 2021-10-06 RX ORDER — PROMETHAZINE HYDROCHLORIDE 25 MG/1
25 SUPPOSITORY RECTAL ONCE AS NEEDED
Status: DISCONTINUED | OUTPATIENT
Start: 2021-10-06 | End: 2021-10-06 | Stop reason: HOSPADM

## 2021-10-06 RX ORDER — HYDRALAZINE HYDROCHLORIDE 20 MG/ML
5 INJECTION INTRAMUSCULAR; INTRAVENOUS
Status: DISCONTINUED | OUTPATIENT
Start: 2021-10-06 | End: 2021-10-06 | Stop reason: HOSPADM

## 2021-10-06 RX ORDER — ONDANSETRON 2 MG/ML
INJECTION INTRAMUSCULAR; INTRAVENOUS AS NEEDED
Status: DISCONTINUED | OUTPATIENT
Start: 2021-10-06 | End: 2021-10-06 | Stop reason: SURG

## 2021-10-06 RX ORDER — NALOXONE HCL 0.4 MG/ML
0.2 VIAL (ML) INJECTION AS NEEDED
Status: DISCONTINUED | OUTPATIENT
Start: 2021-10-06 | End: 2021-10-06 | Stop reason: HOSPADM

## 2021-10-06 RX ORDER — MIDAZOLAM HYDROCHLORIDE 1 MG/ML
0.5 INJECTION INTRAMUSCULAR; INTRAVENOUS
Status: DISCONTINUED | OUTPATIENT
Start: 2021-10-06 | End: 2021-10-06 | Stop reason: HOSPADM

## 2021-10-06 RX ORDER — DIPHENHYDRAMINE HYDROCHLORIDE 50 MG/ML
12.5 INJECTION INTRAMUSCULAR; INTRAVENOUS
Status: DISCONTINUED | OUTPATIENT
Start: 2021-10-06 | End: 2021-10-06 | Stop reason: HOSPADM

## 2021-10-06 RX ORDER — SUCCINYLCHOLINE CHLORIDE 20 MG/ML
INJECTION INTRAMUSCULAR; INTRAVENOUS AS NEEDED
Status: DISCONTINUED | OUTPATIENT
Start: 2021-10-06 | End: 2021-10-06 | Stop reason: SURG

## 2021-10-06 RX ORDER — ONDANSETRON 2 MG/ML
4 INJECTION INTRAMUSCULAR; INTRAVENOUS ONCE AS NEEDED
Status: DISCONTINUED | OUTPATIENT
Start: 2021-10-06 | End: 2021-10-06 | Stop reason: HOSPADM

## 2021-10-06 RX ORDER — FENTANYL CITRATE 50 UG/ML
50 INJECTION, SOLUTION INTRAMUSCULAR; INTRAVENOUS
Status: DISCONTINUED | OUTPATIENT
Start: 2021-10-06 | End: 2021-10-06 | Stop reason: HOSPADM

## 2021-10-06 RX ORDER — LIDOCAINE HYDROCHLORIDE 40 MG/ML
SOLUTION TOPICAL AS NEEDED
Status: DISCONTINUED | OUTPATIENT
Start: 2021-10-06 | End: 2021-10-06 | Stop reason: SURG

## 2021-10-06 RX ORDER — DEXAMETHASONE SODIUM PHOSPHATE 10 MG/ML
INJECTION INTRAMUSCULAR; INTRAVENOUS AS NEEDED
Status: DISCONTINUED | OUTPATIENT
Start: 2021-10-06 | End: 2021-10-06 | Stop reason: SURG

## 2021-10-06 RX ORDER — LIDOCAINE HYDROCHLORIDE 20 MG/ML
INJECTION, SOLUTION INFILTRATION; PERINEURAL AS NEEDED
Status: DISCONTINUED | OUTPATIENT
Start: 2021-10-06 | End: 2021-10-06 | Stop reason: SURG

## 2021-10-06 RX ORDER — HYDROCODONE BITARTRATE AND ACETAMINOPHEN 7.5; 325 MG/1; MG/1
1 TABLET ORAL ONCE AS NEEDED
Status: DISCONTINUED | OUTPATIENT
Start: 2021-10-06 | End: 2021-10-06 | Stop reason: HOSPADM

## 2021-10-06 RX ORDER — OXYCODONE AND ACETAMINOPHEN 10; 325 MG/1; MG/1
1 TABLET ORAL EVERY 4 HOURS PRN
Status: DISCONTINUED | OUTPATIENT
Start: 2021-10-06 | End: 2021-10-06 | Stop reason: HOSPADM

## 2021-10-06 RX ORDER — SODIUM CHLORIDE, SODIUM LACTATE, POTASSIUM CHLORIDE, CALCIUM CHLORIDE 600; 310; 30; 20 MG/100ML; MG/100ML; MG/100ML; MG/100ML
INJECTION, SOLUTION INTRAVENOUS CONTINUOUS PRN
Status: DISCONTINUED | OUTPATIENT
Start: 2021-10-06 | End: 2021-10-06 | Stop reason: SURG

## 2021-10-06 RX ORDER — SODIUM CHLORIDE 0.9 % (FLUSH) 0.9 %
10 SYRINGE (ML) INJECTION AS NEEDED
Status: DISCONTINUED | OUTPATIENT
Start: 2021-10-06 | End: 2021-10-06 | Stop reason: HOSPADM

## 2021-10-06 RX ORDER — PROPOFOL 10 MG/ML
VIAL (ML) INTRAVENOUS AS NEEDED
Status: DISCONTINUED | OUTPATIENT
Start: 2021-10-06 | End: 2021-10-06 | Stop reason: SURG

## 2021-10-06 RX ORDER — LABETALOL HYDROCHLORIDE 5 MG/ML
5 INJECTION, SOLUTION INTRAVENOUS
Status: DISCONTINUED | OUTPATIENT
Start: 2021-10-06 | End: 2021-10-06 | Stop reason: HOSPADM

## 2021-10-06 RX ORDER — IBUPROFEN 600 MG/1
600 TABLET ORAL ONCE AS NEEDED
Status: DISCONTINUED | OUTPATIENT
Start: 2021-10-06 | End: 2021-10-06 | Stop reason: HOSPADM

## 2021-10-06 RX ADMIN — SUCCINYLCHOLINE CHLORIDE 120 MG: 20 INJECTION, SOLUTION INTRAMUSCULAR; INTRAVENOUS; PARENTERAL at 14:32

## 2021-10-06 RX ADMIN — FAMOTIDINE 20 MG: 10 INJECTION INTRAVENOUS at 11:43

## 2021-10-06 RX ADMIN — LIDOCAINE HYDROCHLORIDE 1 EACH: 40 SOLUTION TOPICAL at 14:32

## 2021-10-06 RX ADMIN — ONDANSETRON 4 MG: 2 INJECTION INTRAMUSCULAR; INTRAVENOUS at 14:56

## 2021-10-06 RX ADMIN — ROCURONIUM BROMIDE 10 MG: 50 INJECTION INTRAVENOUS at 14:32

## 2021-10-06 RX ADMIN — LIDOCAINE HYDROCHLORIDE 100 MG: 20 INJECTION, SOLUTION INFILTRATION; PERINEURAL at 14:32

## 2021-10-06 RX ADMIN — PROPOFOL 120 MG: 10 INJECTION, EMULSION INTRAVENOUS at 14:32

## 2021-10-06 RX ADMIN — DEXAMETHASONE SODIUM PHOSPHATE 8 MG: 10 INJECTION INTRAMUSCULAR; INTRAVENOUS at 14:43

## 2021-10-06 RX ADMIN — SODIUM CHLORIDE, POTASSIUM CHLORIDE, SODIUM LACTATE AND CALCIUM CHLORIDE 9 ML/HR: 600; 310; 30; 20 INJECTION, SOLUTION INTRAVENOUS at 11:24

## 2021-10-06 RX ADMIN — SODIUM CHLORIDE, POTASSIUM CHLORIDE, SODIUM LACTATE AND CALCIUM CHLORIDE: 600; 310; 30; 20 INJECTION, SOLUTION INTRAVENOUS at 13:46

## 2021-10-06 NOTE — H&P
[facility]   HISTORY AND PHYSICAL    Patient Name: Camilla Marcos  : 1945  MRN: [id number]  Primary Care Physician:  Homero Ro MD  Date of admission: 10/6/2021    Subjective   Subjective     Chief Complaint: gastropleural fistula    History of Present Illness  Ms. Marcos is a pleasant 76-year-old lady status post esophagectomy for GIST.  She presented with a gastropleural fistula just below her esophagogastric anastomosis.  She has undergone a stent and feeding tube placement.  She recently had COVID-19 and has recovered from that infection.  Review of Systems   Constitutional: Negative.    HENT: Negative.    Eyes: Negative.    Respiratory: Negative.    Cardiovascular: Negative.    Gastrointestinal: Negative.    Endocrine: Negative.    Genitourinary: Negative.    Musculoskeletal: Negative.    Skin: Negative.    Allergic/Immunologic: Negative.    Neurological: Negative.    Hematological: Negative.    Psychiatric/Behavioral: Negative.         Personal History     Past Medical History:   Diagnosis Date   • A-fib ([facility])     dr. G[place]  [place]   • Anxiety    • Arthritis    • Breast cancer ([facility])    • COVID-19 virus infection 2021   • Delayed emergence from anesthesia     history   • Depression     situational  gets upset when in hospital    • Diabetes ([facility])     diet controlled  has weight loss   • Dysphagia    • Esophageal cancer ([facility])    • GERD (gastroesophageal reflux disease)    • History of mononucleosis    • History of transfusion     no reaction   • HX: breast cancer     right   • Hypertension    • Leg swelling     left leg d/t chemo pill   • Osteoporosis    • Thyroid disorder     hypothyroid   • Thyroid nodule        Past Surgical History:   Procedure Laterality Date   • BREAST BIOPSY Right    • BREAST BIOPSY Left    • BREAST LUMPECTOMY Right    • COLONOSCOPY     • ENDOSCOPY  2019   • ENDOSCOPY  2018   • ENDOSCOPY N/A 2019    Procedure:  ESOPHAGOGASTRODUODENOSCOPY with DILATATION (12-15mm balloon);  Surgeon: Aldair Booker MD;  Location: [facility] ENDOSCOPY;  Service: Gastroenterology   • ENDOSCOPY N/A 12/16/2019    Procedure: ESOPHAGOGASTRODUODENOSCOPY with balloon dilitation 15-18mm) up to 16mm;  Surgeon: Aldair Booker MD;  Location: [facility] ENDOSCOPY;  Service: Gastroenterology   • ENDOSCOPY N/A 7/24/2020    Procedure: ESOPHAGOGASTRODUODENOSCOPY with balloon dilation(12mm-15mm up to 14.5mm) of esophageal anastomosis stricture;  Surgeon: Aldair Booker MD;  Location: [facility] ENDOSCOPY;  Service: Gastroenterology;  Laterality: N/A;  esophageal stricture   • ENDOSCOPY N/A 5/13/2021    Procedure: ESOPHAGOGASTRODUODENOSCOPY WITH BALLOON DILATION UP TO 19MM;  Surgeon: Lily Bettencourt MD;  Location: [facility] ENDOSCOPY;  Service: Gastroenterology;  Laterality: N/A;  ANASTAMOTIC STRICTURE   • ENDOSCOPY N/A 6/24/2021    Procedure: ESOPHAGOGASTRODUODENOSCOPY WITH  DILATATION WITH BALLOON (18-20MM, UP TO 20MM);  Surgeon: Lily Bettencourt MD;  Location: [facility] ENDOSCOPY;  Service: Gastroenterology;  Laterality: N/A;  ESOPHAGOGASTRO ANASTOMOTIC STRICTURE   • ENDOSCOPY N/A 7/22/2021    Procedure: ESOPHAGOGASTRODUodenoscopy;  Surgeon: Lily Bettencourt MD;  Location: [facility] ENDOSCOPY;  Service: Gastroenterology;  Laterality: N/A;  esophageal pleural fistula   • ENDOSCOPY N/A 7/23/2021    Procedure: ESOPHAGOGASTRODUODENOSCOPY with stent placement;  Surgeon: Lily Bettencourt MD;  Location: [facility] OR;  Service: Gastroenterology;  Laterality: N/A;   • ESOPHAGOGASTRECTOMY  03/04/2019    Royce Sunil   • ESOPHAGUS SURGERY      had a stent placed for hole in esophagus   • JEJUNOSTOMY N/A 7/26/2021    Procedure: OPEN JEJUNOSTOMY TUBE;  Surgeon: Bulmaro Coreas Jr., MD;  Location: [facility] OR;  Service: General;  Laterality: N/A;   • LAPAROSCOPIC TUBAL LIGATION     • THORACOTOMY Right 08/17/2018    right vats biopsy of mediastinal mass, right thoracotomy        Family History: family history includes Bone cancer in her mother; Diabetes in her daughter and maternal grandfather; Hypertension in her daughter and mother; Ulcerative colitis in her father. Otherwise pertinent FHx was reviewed and not pertinent to current issue.    Social History:  reports that she quit smoking about 21 years ago. Her smoking use included cigars and cigarettes. She has a 5.00 pack-year smoking history. She has never used smokeless tobacco. She reports previous alcohol use. She reports that she does not use drugs.    Home Medications:  Chlorhexidine Gluconate Cloth, Cyanocobalamin, apixaban, levothyroxine, and metoprolol tartrate    Allergies:  Allergies   Allergen Reactions   • Amoxicillin Rash   • Contrast Dye Rash   • Iodine Rash       Objective    Objective     Vitals:   Temp:  [97.6 °F (36.4 °C)] 97.6 °F (36.4 °C)  Heart Rate:  [78] 78  Resp:  [16] 16  BP: (147)/(66) 147/66    Physical Exam  Vitals and nursing note reviewed.   Constitutional:       Appearance: She is well-developed.   HENT:      Head: Normocephalic and atraumatic.      Nose: Nose normal.   Eyes:      Conjunctiva/sclera: Conjunctivae normal.   Cardiovascular:      Rate and Rhythm: Normal rate.   Pulmonary:      Effort: Pulmonary effort is normal.   Abdominal:      Palpations: Abdomen is soft.   Musculoskeletal:      Cervical back: Neck supple.   Skin:     General: Skin is warm and dry.   Neurological:      Mental Status: She is alert and oriented to person, place, and time.   Psychiatric:         Behavior: Behavior normal.         Thought Content: Thought content normal.         Judgment: Judgment normal.         Result Review    Result Review:  I have personally reviewed the results from the time of this admission to 10/6/2021 14:16 EDT and agree with these findings:  []  Laboratory  []  Microbiology  []  Radiology  []  EKG/Telemetry   []  Cardiology/Vascular   []  Pathology  []  Old records  []   Other:      Assessment/Plan   Assessment / Plan     Brief Patient Summary:  Camilla Marcos is a 76 y.o. female who has an esophageal stent for occlusion of a gastropleural fistula.    Active Hospital Problems:  Active Hospital Problems    Diagnosis    • **Gastric fistula      Added automatically from request for surgery [id number]       Plan: We will plan EGD with stent removal and examination of her esophagogastric anastomosis and gastric conduit.        Electronically signed by Lily Bettencourt MD, 10/06/21, 2:16 PM EDT.

## 2021-10-06 NOTE — ANESTHESIA PROCEDURE NOTES
Airway  Urgency: elective    Date/Time: 10/6/2021 2:32 PM  Airway not difficult    General Information and Staff    Patient location during procedure: OR  Anesthesiologist: Anuj Llamas MD  CRNA: David Dunlap CRNA    Indications and Patient Condition  Indications for airway management: airway protection    Preoxygenated: yes  MILS not maintained throughout  Mask difficulty assessment: 1 - vent by mask    Final Airway Details  Final airway type: endotracheal airway      Successful airway: ETT  Cuffed: yes   Successful intubation technique: direct laryngoscopy  Endotracheal tube insertion site: oral  Blade: Alexis  Blade size: 3  ETT size (mm): 7.0  Cormack-Lehane Classification: grade I - full view of glottis  Placement verified by: chest auscultation and capnometry   Cuff volume (mL): 6  Measured from: lips  ETT/EBT  to lips (cm): 20  Number of attempts at approach: 1  Assessment: lips, teeth, and gum same as pre-op and atraumatic intubation    Additional Comments  Pre O2, SIAI

## 2021-10-06 NOTE — ANESTHESIA POSTPROCEDURE EVALUATION
Patient: Camilla Marcos    Procedure Summary     Date: 10/06/21 Room / Location: [facility] OR 03 / [facility] MAIN OR    Anesthesia Start: 1420 Anesthesia Stop: 1517    Procedure: ESOPHAGOGASTRODUODENOSCOPY WITH STENT removal and possible replacement (N/A Esophagus) Diagnosis:       Gastric fistula      (Gastric fistula [K31.6])    Surgeons: Lily Bettencourt MD Provider: Anuj Llamas MD    Anesthesia Type: general ASA Status: 3          Anesthesia Type: general    Vitals  Vitals Value Taken Time   /82 10/06/21 1536   Temp 36.4 °C (97.5 °F) 10/06/21 1515   Pulse 68 10/06/21 1542   Resp 16 10/06/21 1535   SpO2 99 % 10/06/21 1542   Vitals shown include unvalidated device data.        Post Anesthesia Care and Evaluation    Patient location during evaluation: PACU  Patient participation: complete - patient participated  Level of consciousness: awake and alert  Pain management: adequate  Airway patency: patent  Anesthetic complications: No anesthetic complications    Cardiovascular status: acceptable  Respiratory status: acceptable  Hydration status: acceptable    Comments: --------------------            10/06/21               1535     --------------------   BP:       152/82     Pulse:      66       Resp:       16       Temp:                SpO2:      99%      --------------------

## 2021-10-06 NOTE — PERIOPERATIVE NURSING NOTE
Spoke with Dr. Bettencourt in regards to discharge orders. States she can have clear liquid but only ice chips and water and nothing else d/t pt having g-tube.

## 2021-10-06 NOTE — OP NOTE
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 Item Serial No.  Lot No. LRB No. Used Action   STNT ESOPH EVOLUTION DS FULL CVR [id number] 10CM - [id number] Stent STNT ESOPH EVOLUTION DS FULL CVR [id number] 10CM  [place] [id number] N/A 1 Implanted       Specimen:          None      Findings: Improving gastopleural fistula    Complications: None apparent    Description of Procedure: Ms. Marcos was identified in the preoperative holding area and again her consent for the procedure was verified.  She was transferred operating room placed on the operating table in supine position.  A general anesthetic was successfully administered and she was intubated without difficulty.  The esophagus scope was introduced in patient's esophagus and the esophagus was examined to the level of the anastomosis.  The stent was examined and was in good position, a rat toothed grasper was used to remove the stent without difficulty.  The esophagus was examined and there was a significant improvement in the gastropleural fistula with a very small residual space left over.  The stent was replaced without difficulty under fluoroscopic guidance, and 18 x 10 cm stent was used. Patient tolerated this procedure well, was extubated and transferred to recovery room.      Lily Bettencourt MD     Date: 10/6/2021  Time: 15:09 EDT

## 2021-10-06 NOTE — ANESTHESIA PREPROCEDURE EVALUATION
Anesthesia Evaluation     Patient summary reviewed   NPO Solid Status: > 8 hours             Airway   Mallampati: II  Neck ROM: full  No difficulty expected  Dental      Pulmonary    Cardiovascular     Rhythm: irregular    (+) hypertension, dysrhythmias Paroxysmal Atrial Fib,       Neuro/Psych  GI/Hepatic/Renal/Endo    (+)  GERD,  diabetes mellitus,     Musculoskeletal     Abdominal    Substance History      OB/GYN          Other      history of cancer active                  Anesthesia Plan    ASA 3     general   (GA with LMA    I have reviewed the patient's history with the patient and the chart, including all pertinent laboratory results and imaging. I have explained the risks of anesthesia including but not limited to dental damage, corneal abrasion, nerve injury, MI, stroke, and death. Questions asked and answered. Anesthetic plan discussed with patient and team as indicated. Patient expressed understanding of the above.  )  intravenous induction     Anesthetic plan, all risks, benefits, and alternatives have been provided, discussed and informed consent has been obtained with: patient.  Use of blood products discussed with patient .

## 2021-10-14 ENCOUNTER — PREP FOR SURGERY (OUTPATIENT)
Dept: OTHER | Facility: HOSPITAL | Age: 76
End: 2021-10-14

## 2021-10-14 DIAGNOSIS — R79.1 ABNORMAL COAGULATION PROFILE: ICD-10-CM

## 2021-10-14 DIAGNOSIS — K21.9 GASTRO-ESOPHAGEAL REFLUX DISEASE WITHOUT ESOPHAGITIS: Primary | ICD-10-CM

## 2021-10-14 RX ORDER — SODIUM CHLORIDE 0.9 % (FLUSH) 0.9 %
3-10 SYRINGE (ML) INJECTION AS NEEDED
Status: CANCELLED | OUTPATIENT
Start: 2021-11-17

## 2021-10-14 RX ORDER — SODIUM CHLORIDE 0.9 % (FLUSH) 0.9 %
3 SYRINGE (ML) INJECTION EVERY 12 HOURS SCHEDULED
Status: CANCELLED | OUTPATIENT
Start: 2021-11-17

## 2021-10-14 RX ORDER — CEFAZOLIN SODIUM 2 G/100ML
2 INJECTION, SOLUTION INTRAVENOUS ONCE
Status: CANCELLED | OUTPATIENT
Start: 2021-11-17 | End: 2021-10-14

## 2021-11-15 ENCOUNTER — PRE-ADMISSION TESTING (OUTPATIENT)
Dept: PREADMISSION TESTING | Facility: HOSPITAL | Age: 76
End: 2021-11-15

## 2021-11-15 VITALS
DIASTOLIC BLOOD PRESSURE: 81 MMHG | OXYGEN SATURATION: 100 % | TEMPERATURE: 97.3 F | SYSTOLIC BLOOD PRESSURE: 161 MMHG | WEIGHT: 122 LBS | HEIGHT: 68 IN | BODY MASS INDEX: 18.49 KG/M2 | RESPIRATION RATE: 16 BRPM | HEART RATE: 82 BPM

## 2021-11-15 DIAGNOSIS — R79.1 ABNORMAL COAGULATION PROFILE: ICD-10-CM

## 2021-11-15 DIAGNOSIS — K21.9 GASTRO-ESOPHAGEAL REFLUX DISEASE WITHOUT ESOPHAGITIS: ICD-10-CM

## 2021-11-15 LAB
ABO GROUP BLD: NORMAL
ANION GAP SERPL CALCULATED.3IONS-SCNC: 8.4 MMOL/L (ref 5–15)
APTT PPP: 33.2 SECONDS (ref 22.7–35.4)
BASOPHILS # BLD AUTO: 0.03 10*3/MM3 (ref 0–0.2)
BASOPHILS NFR BLD AUTO: 0.4 % (ref 0–1.5)
BLD GP AB SCN SERPL QL: NEGATIVE
BUN SERPL-MCNC: 37 MG/DL (ref 8–23)
BUN/CREAT SERPL: 56.9 (ref 7–25)
CALCIUM SPEC-SCNC: 10.2 MG/DL (ref 8.6–10.5)
CHLORIDE SERPL-SCNC: 102 MMOL/L (ref 98–107)
CO2 SERPL-SCNC: 28.6 MMOL/L (ref 22–29)
CREAT SERPL-MCNC: 0.65 MG/DL (ref 0.57–1)
DEPRECATED RDW RBC AUTO: 38 FL (ref 37–54)
EOSINOPHIL # BLD AUTO: 0.21 10*3/MM3 (ref 0–0.4)
EOSINOPHIL NFR BLD AUTO: 3 % (ref 0.3–6.2)
ERYTHROCYTE [DISTWIDTH] IN BLOOD BY AUTOMATED COUNT: 12.4 % (ref 12.3–15.4)
GFR SERPL CREATININE-BSD FRML MDRD: 89 ML/MIN/1.73
GLUCOSE SERPL-MCNC: 105 MG/DL (ref 65–99)
HCT VFR BLD AUTO: 38.5 % (ref 34–46.6)
HGB BLD-MCNC: 13.6 G/DL (ref 12–15.9)
IMM GRANULOCYTES # BLD AUTO: 0.03 10*3/MM3 (ref 0–0.05)
IMM GRANULOCYTES NFR BLD AUTO: 0.4 % (ref 0–0.5)
INR PPP: 1.05 (ref 0.9–1.1)
LYMPHOCYTES # BLD AUTO: 1.93 10*3/MM3 (ref 0.7–3.1)
LYMPHOCYTES NFR BLD AUTO: 27.8 % (ref 19.6–45.3)
MCH RBC QN AUTO: 30.2 PG (ref 26.6–33)
MCHC RBC AUTO-ENTMCNC: 35.3 G/DL (ref 31.5–35.7)
MCV RBC AUTO: 85.4 FL (ref 79–97)
MONOCYTES # BLD AUTO: 0.63 10*3/MM3 (ref 0.1–0.9)
MONOCYTES NFR BLD AUTO: 9.1 % (ref 5–12)
NEUTROPHILS NFR BLD AUTO: 4.11 10*3/MM3 (ref 1.7–7)
NEUTROPHILS NFR BLD AUTO: 59.3 % (ref 42.7–76)
NRBC BLD AUTO-RTO: 0 /100 WBC (ref 0–0.2)
PLATELET # BLD AUTO: 330 10*3/MM3 (ref 140–450)
PMV BLD AUTO: 9.8 FL (ref 6–12)
POTASSIUM SERPL-SCNC: 4.5 MMOL/L (ref 3.5–5.2)
PROTHROMBIN TIME: 13.5 SECONDS (ref 11.7–14.2)
RBC # BLD AUTO: 4.51 10*6/MM3 (ref 3.77–5.28)
RH BLD: NEGATIVE
SARS-COV-2 ORF1AB RESP QL NAA+PROBE: DETECTED
SODIUM SERPL-SCNC: 139 MMOL/L (ref 136–145)
T&S EXPIRATION DATE: NORMAL
WBC # BLD AUTO: 6.94 10*3/MM3 (ref 3.4–10.8)

## 2021-11-15 PROCEDURE — 36415 COLL VENOUS BLD VENIPUNCTURE: CPT

## 2021-11-15 PROCEDURE — U0005 INFEC AGEN DETEC AMPLI PROBE: HCPCS

## 2021-11-15 PROCEDURE — C9803 HOPD COVID-19 SPEC COLLECT: HCPCS

## 2021-11-15 PROCEDURE — 85730 THROMBOPLASTIN TIME PARTIAL: CPT

## 2021-11-15 PROCEDURE — U0004 COV-19 TEST NON-CDC HGH THRU: HCPCS

## 2021-11-15 PROCEDURE — 86901 BLOOD TYPING SEROLOGIC RH(D): CPT

## 2021-11-15 PROCEDURE — 86850 RBC ANTIBODY SCREEN: CPT

## 2021-11-15 PROCEDURE — 85610 PROTHROMBIN TIME: CPT

## 2021-11-15 PROCEDURE — 86900 BLOOD TYPING SEROLOGIC ABO: CPT

## 2021-11-15 PROCEDURE — 80048 BASIC METABOLIC PNL TOTAL CA: CPT

## 2021-11-15 PROCEDURE — 85025 COMPLETE CBC W/AUTO DIFF WBC: CPT

## 2021-11-17 ENCOUNTER — TRANSCRIBE ORDERS (OUTPATIENT)
Dept: SURGERY | Facility: CLINIC | Age: 76
End: 2021-11-17

## 2021-11-17 DIAGNOSIS — Z01.818 OTHER SPECIFIED PRE-OPERATIVE EXAMINATION: Primary | ICD-10-CM

## 2021-11-30 ENCOUNTER — LAB (OUTPATIENT)
Dept: LAB | Facility: HOSPITAL | Age: 76
End: 2021-11-30

## 2021-11-30 DIAGNOSIS — Z20.822 ENCOUNTER FOR PREPROCEDURE SCREENING LABORATORY TESTING FOR COVID-19: Primary | ICD-10-CM

## 2021-11-30 DIAGNOSIS — Z01.812 ENCOUNTER FOR PREPROCEDURE SCREENING LABORATORY TESTING FOR COVID-19: Primary | ICD-10-CM

## 2021-11-30 DIAGNOSIS — Z01.818 OTHER SPECIFIED PRE-OPERATIVE EXAMINATION: ICD-10-CM

## 2021-11-30 PROCEDURE — C9803 HOPD COVID-19 SPEC COLLECT: HCPCS

## 2021-11-30 PROCEDURE — U0004 COV-19 TEST NON-CDC HGH THRU: HCPCS

## 2021-11-30 PROCEDURE — U0005 INFEC AGEN DETEC AMPLI PROBE: HCPCS

## 2021-12-01 ENCOUNTER — APPOINTMENT (OUTPATIENT)
Dept: GENERAL RADIOLOGY | Facility: HOSPITAL | Age: 76
End: 2021-12-01

## 2021-12-01 ENCOUNTER — ANESTHESIA EVENT (OUTPATIENT)
Dept: PERIOP | Facility: HOSPITAL | Age: 76
End: 2021-12-01

## 2021-12-01 ENCOUNTER — HOSPITAL ENCOUNTER (OUTPATIENT)
Facility: HOSPITAL | Age: 76
Setting detail: HOSPITAL OUTPATIENT SURGERY
Discharge: HOME | End: 2021-12-01
Attending: THORACIC SURGERY (CARDIOTHORACIC VASCULAR SURGERY) | Admitting: THORACIC SURGERY (CARDIOTHORACIC VASCULAR SURGERY)

## 2021-12-01 ENCOUNTER — ANESTHESIA (OUTPATIENT)
Dept: PERIOP | Facility: HOSPITAL | Age: 76
End: 2021-12-01

## 2021-12-01 VITALS
RESPIRATION RATE: 18 BRPM | WEIGHT: 121.25 LBS | HEIGHT: 68 IN | BODY MASS INDEX: 18.38 KG/M2 | SYSTOLIC BLOOD PRESSURE: 128 MMHG | HEART RATE: 70 BPM | TEMPERATURE: 97.5 F | DIASTOLIC BLOOD PRESSURE: 75 MMHG | OXYGEN SATURATION: 98 %

## 2021-12-01 DIAGNOSIS — K21.9 GASTRO-ESOPHAGEAL REFLUX DISEASE WITHOUT ESOPHAGITIS: ICD-10-CM

## 2021-12-01 DIAGNOSIS — K91.89 GASTRIC LEAK: ICD-10-CM

## 2021-12-01 DIAGNOSIS — K31.6 GASTRIC FISTULA: Primary | ICD-10-CM

## 2021-12-01 LAB
ABO GROUP BLD: NORMAL
BLD GP AB SCN SERPL QL: NEGATIVE
GLUCOSE BLDC GLUCOMTR-MCNC: 109 MG/DL (ref 70–130)
GLUCOSE BLDC GLUCOMTR-MCNC: 112 MG/DL (ref 70–130)
RH BLD: NEGATIVE
SARS-COV-2 ORF1AB RESP QL NAA+PROBE: NOT DETECTED
T&S EXPIRATION DATE: NORMAL

## 2021-12-01 PROCEDURE — 86850 RBC ANTIBODY SCREEN: CPT | Performed by: THORACIC SURGERY (CARDIOTHORACIC VASCULAR SURGERY)

## 2021-12-01 PROCEDURE — 25010000002 PROPOFOL 10 MG/ML EMULSION: Performed by: NURSE ANESTHETIST, CERTIFIED REGISTERED

## 2021-12-01 PROCEDURE — 86901 BLOOD TYPING SEROLOGIC RH(D): CPT | Performed by: THORACIC SURGERY (CARDIOTHORACIC VASCULAR SURGERY)

## 2021-12-01 PROCEDURE — 74220 X-RAY XM ESOPHAGUS 1CNTRST: CPT

## 2021-12-01 PROCEDURE — S0260 H&P FOR SURGERY: HCPCS | Performed by: THORACIC SURGERY (CARDIOTHORACIC VASCULAR SURGERY)

## 2021-12-01 PROCEDURE — 82962 GLUCOSE BLOOD TEST: CPT

## 2021-12-01 PROCEDURE — 0 DIATRIZOATE MEGLUMINE & SODIUM PER 1 ML: Performed by: THORACIC SURGERY (CARDIOTHORACIC VASCULAR SURGERY)

## 2021-12-01 PROCEDURE — 86900 BLOOD TYPING SEROLOGIC ABO: CPT | Performed by: THORACIC SURGERY (CARDIOTHORACIC VASCULAR SURGERY)

## 2021-12-01 PROCEDURE — 25010000002 ONDANSETRON PER 1 MG: Performed by: NURSE ANESTHETIST, CERTIFIED REGISTERED

## 2021-12-01 PROCEDURE — C1713 ANCHOR/SCREW BN/BN,TIS/BN: HCPCS | Performed by: THORACIC SURGERY (CARDIOTHORACIC VASCULAR SURGERY)

## 2021-12-01 PROCEDURE — 43247 EGD REMOVE FOREIGN BODY: CPT | Performed by: THORACIC SURGERY (CARDIOTHORACIC VASCULAR SURGERY)

## 2021-12-01 RX ORDER — FLUMAZENIL 0.1 MG/ML
0.2 INJECTION INTRAVENOUS AS NEEDED
Status: DISCONTINUED | OUTPATIENT
Start: 2021-12-01 | End: 2021-12-01 | Stop reason: HOSPADM

## 2021-12-01 RX ORDER — DIPHENHYDRAMINE HCL 25 MG
25 CAPSULE ORAL
Status: DISCONTINUED | OUTPATIENT
Start: 2021-12-01 | End: 2021-12-01 | Stop reason: HOSPADM

## 2021-12-01 RX ORDER — ONDANSETRON 2 MG/ML
4 INJECTION INTRAMUSCULAR; INTRAVENOUS ONCE AS NEEDED
Status: DISCONTINUED | OUTPATIENT
Start: 2021-12-01 | End: 2021-12-01 | Stop reason: HOSPADM

## 2021-12-01 RX ORDER — SODIUM CHLORIDE, SODIUM LACTATE, POTASSIUM CHLORIDE, CALCIUM CHLORIDE 600; 310; 30; 20 MG/100ML; MG/100ML; MG/100ML; MG/100ML
9 INJECTION, SOLUTION INTRAVENOUS CONTINUOUS
Status: DISCONTINUED | OUTPATIENT
Start: 2021-12-01 | End: 2021-12-01 | Stop reason: HOSPADM

## 2021-12-01 RX ORDER — LABETALOL HYDROCHLORIDE 5 MG/ML
5 INJECTION, SOLUTION INTRAVENOUS
Status: DISCONTINUED | OUTPATIENT
Start: 2021-12-01 | End: 2021-12-01 | Stop reason: HOSPADM

## 2021-12-01 RX ORDER — HYDRALAZINE HYDROCHLORIDE 20 MG/ML
5 INJECTION INTRAMUSCULAR; INTRAVENOUS
Status: DISCONTINUED | OUTPATIENT
Start: 2021-12-01 | End: 2021-12-01 | Stop reason: HOSPADM

## 2021-12-01 RX ORDER — NALOXONE HCL 0.4 MG/ML
0.2 VIAL (ML) INJECTION AS NEEDED
Status: DISCONTINUED | OUTPATIENT
Start: 2021-12-01 | End: 2021-12-01 | Stop reason: HOSPADM

## 2021-12-01 RX ORDER — FAMOTIDINE 10 MG/ML
20 INJECTION, SOLUTION INTRAVENOUS ONCE
Status: COMPLETED | OUTPATIENT
Start: 2021-12-01 | End: 2021-12-01

## 2021-12-01 RX ORDER — FENTANYL CITRATE 50 UG/ML
50 INJECTION, SOLUTION INTRAMUSCULAR; INTRAVENOUS
Status: DISCONTINUED | OUTPATIENT
Start: 2021-12-01 | End: 2021-12-01 | Stop reason: HOSPADM

## 2021-12-01 RX ORDER — DIPHENHYDRAMINE HYDROCHLORIDE 50 MG/ML
12.5 INJECTION INTRAMUSCULAR; INTRAVENOUS
Status: DISCONTINUED | OUTPATIENT
Start: 2021-12-01 | End: 2021-12-01 | Stop reason: HOSPADM

## 2021-12-01 RX ORDER — SODIUM CHLORIDE 0.9 % (FLUSH) 0.9 %
3 SYRINGE (ML) INJECTION EVERY 12 HOURS SCHEDULED
Status: DISCONTINUED | OUTPATIENT
Start: 2021-12-01 | End: 2021-12-01 | Stop reason: HOSPADM

## 2021-12-01 RX ORDER — ONDANSETRON 2 MG/ML
INJECTION INTRAMUSCULAR; INTRAVENOUS AS NEEDED
Status: DISCONTINUED | OUTPATIENT
Start: 2021-12-01 | End: 2021-12-01 | Stop reason: SURG

## 2021-12-01 RX ORDER — ROCURONIUM BROMIDE 10 MG/ML
INJECTION, SOLUTION INTRAVENOUS AS NEEDED
Status: DISCONTINUED | OUTPATIENT
Start: 2021-12-01 | End: 2021-12-01 | Stop reason: SURG

## 2021-12-01 RX ORDER — LIDOCAINE HYDROCHLORIDE 20 MG/ML
INJECTION, SOLUTION INFILTRATION; PERINEURAL AS NEEDED
Status: DISCONTINUED | OUTPATIENT
Start: 2021-12-01 | End: 2021-12-01 | Stop reason: SURG

## 2021-12-01 RX ORDER — EPHEDRINE SULFATE 50 MG/ML
5 INJECTION, SOLUTION INTRAVENOUS ONCE AS NEEDED
Status: DISCONTINUED | OUTPATIENT
Start: 2021-12-01 | End: 2021-12-01 | Stop reason: HOSPADM

## 2021-12-01 RX ORDER — PROPOFOL 10 MG/ML
VIAL (ML) INTRAVENOUS AS NEEDED
Status: DISCONTINUED | OUTPATIENT
Start: 2021-12-01 | End: 2021-12-01 | Stop reason: SURG

## 2021-12-01 RX ORDER — SODIUM CHLORIDE 0.9 % (FLUSH) 0.9 %
3-10 SYRINGE (ML) INJECTION AS NEEDED
Status: DISCONTINUED | OUTPATIENT
Start: 2021-12-01 | End: 2021-12-01 | Stop reason: HOSPADM

## 2021-12-01 RX ORDER — LIDOCAINE HYDROCHLORIDE 10 MG/ML
0.5 INJECTION, SOLUTION EPIDURAL; INFILTRATION; INTRACAUDAL; PERINEURAL ONCE AS NEEDED
Status: DISCONTINUED | OUTPATIENT
Start: 2021-12-01 | End: 2021-12-01 | Stop reason: HOSPADM

## 2021-12-01 RX ORDER — MIDAZOLAM HYDROCHLORIDE 1 MG/ML
0.5 INJECTION INTRAMUSCULAR; INTRAVENOUS
Status: DISCONTINUED | OUTPATIENT
Start: 2021-12-01 | End: 2021-12-01 | Stop reason: HOSPADM

## 2021-12-01 RX ADMIN — SODIUM CHLORIDE, POTASSIUM CHLORIDE, SODIUM LACTATE AND CALCIUM CHLORIDE 9 ML/HR: 600; 310; 30; 20 INJECTION, SOLUTION INTRAVENOUS at 11:51

## 2021-12-01 RX ADMIN — PROPOFOL 100 MG: 10 INJECTION, EMULSION INTRAVENOUS at 12:09

## 2021-12-01 RX ADMIN — ONDANSETRON 4 MG: 2 INJECTION INTRAMUSCULAR; INTRAVENOUS at 12:17

## 2021-12-01 RX ADMIN — FAMOTIDINE 20 MG: 10 INJECTION INTRAVENOUS at 11:51

## 2021-12-01 RX ADMIN — ROCURONIUM BROMIDE 20 MG: 50 INJECTION INTRAVENOUS at 12:09

## 2021-12-01 RX ADMIN — SUGAMMADEX 100 MG: 100 INJECTION, SOLUTION INTRAVENOUS at 12:20

## 2021-12-01 RX ADMIN — LIDOCAINE HYDROCHLORIDE 100 MG: 20 INJECTION, SOLUTION INFILTRATION; PERINEURAL at 12:07

## 2021-12-01 RX ADMIN — DIATRIZOATE MEGLUMINE AND DIATRIZOATE SODIUM 120 ML: 660; 100 LIQUID ORAL; RECTAL at 13:59

## 2021-12-01 RX ADMIN — BARIUM SULFATE 183 ML: 960 POWDER, FOR SUSPENSION ORAL at 13:58

## 2021-12-01 NOTE — ANESTHESIA POSTPROCEDURE EVALUATION
"Patient: Camilla Marcos    Procedure Summary     Date: 12/01/21 Room / Location: [facility] OR [address] [place] [address] MAIN OR    Anesthesia Start: 1203 Anesthesia Stop: 1236    Procedure: ESOPHAGOGASTRODUODENOSCOPY WITH STENT REMOVAL (N/A Esophagus) Diagnosis:       Gastro-esophageal reflux disease without esophagitis      (Gastro-esophageal reflux disease without esophagitis [K21.9])    Surgeons: Lily Bettencourt MD Provider: Ej Palomino MD    Anesthesia Type: general ASA Status: 4          Anesthesia Type: general    Vitals  Vitals Value Taken Time   /65 12/01/21 1246   Temp 36.4 °C (97.5 °F) 12/01/21 1234   Pulse 67 12/01/21 1259   Resp 16 12/01/21 1245   SpO2 100 % 12/01/21 1259   Vitals shown include unvalidated device data.        Post Anesthesia Care and Evaluation    Patient location during evaluation: bedside  Patient participation: complete - patient participated  Level of consciousness: awake and alert  Pain management: adequate  Airway patency: patent  Anesthetic complications: No anesthetic complications  PONV Status: none  Cardiovascular status: acceptable  Respiratory status: acceptable  Hydration status: acceptable    Comments: /65   Pulse 66   Temp 36.4 °C (97.5 °F) (Oral)   Resp 16   Ht 171.5 cm (67.5\")   Wt 55 kg (121 lb 4.1 oz)   SpO2 100%   BMI 18.71 kg/m²         "

## 2021-12-01 NOTE — OP NOTE
ESOPHAGOGASTRODUODENOSCOPY WITH  DILATATION WITH FLUOROSCOPY  Procedure Report    Patient Name:  Camilla Marcos  YOB: 1945    Date of Surgery:  12/1/2021     Indications: Esophagogastric anastomotic leak    Pre-op Diagnosis:   Esophagogastric anastomotic leak  GIST    Postop diagnosis:  Procedure/CPT® Codes:  Esophagogastric anastomotic leak  GIST    Procedure(s):  ESOPHAGOGASTRODUODENOSCOPY WITH STENT REMOVAL    Staff:  Surgeon(s):  Lily Bettencourt MD      Anesthesia: General    Estimated Blood Loss: minimal    Implants:    Nothing was implanted during the procedure    Specimen:          None      Findings: Stent removed without difficulty, minimal residual defect in the esophagogastric anastomosis, consistent with a small diverticula    Complications: None apparent    Description of Procedure: Ms. Marcos was identified in the preoperative holding area and again her consent for the procedure was verified.  She was transported to the operating room placed on the operating table in supine position.  General anesthetic was successfully ministered she was intubated without difficulty.  A timeout was performed.    The Olympus videoendoscope was introduced in the patient's esophagus and the stent was encountered at approximately 20 cm from the lip.  The string at the proximal aspect of the stent was grasped and the stent was removed without difficulty.  On examination of the esophagus and gastric conduit, there was a small residual opening with what appeared to be a diverticulum, covered with mucosa.  No evidence of leak.  The remainder of the conduit was within normal limits, the pylorus was open and the duodenum was normal.  The scope was withdrawn.  The patient tolerated this procedure well, was extubated and transferred recovery room in stable condition.    Plan: Esophagram in the recovery room and if normal, patient will be discharged with clear liquids.      Lily Bettencourt MD     Date:  12/1/2021  Time: 12:30 EST

## 2021-12-01 NOTE — H&P
[facility]   HISTORY AND PHYSICAL    Patient Name: Camilla Marcos  : 1945  MRN: [id number]  Primary Care Physician:  Homero Ro MD  Date of admission: 2021    Subjective   Subjective     Chief Complaint: Esophagogastric anastomotic leak    History of Present Illness  Ms. Marcos is a pleasant 76-year-old lady status post esophagectomy for GIST.  She presented with a gastropleural fistula just below her esophagogastric anastomosis.  She has undergone a stent and feeding tube placement.  She is doing well without complaints.  She is hungry.  Review of Systems   Constitutional: Positive for fatigue. Negative for fever.   HENT: Negative.    Eyes: Negative.    Respiratory: Negative.    Cardiovascular: Negative.    Gastrointestinal: Negative.    Endocrine: Negative.    Genitourinary: Negative.    Musculoskeletal: Negative.    Skin: Negative.    Allergic/Immunologic: Negative.    Neurological: Negative.    Hematological: Negative.    Psychiatric/Behavioral: Negative.         Personal History     Past Medical History:   Diagnosis Date   • A-fib ([facility])     dr. Givens - town   • Anxiety    • Arthritis    • Breast cancer ([facility])    • COVID-19 virus infection 2021   • Delayed emergence from anesthesia     history   • Depression     situational  gets upset when in hospital    • Diabetes ([facility])     diet controlled  has weight loss   • Dysphagia    • Esophageal cancer ([facility])    • GERD (gastroesophageal reflux disease)    • History of mononucleosis    • History of transfusion     no reaction   • HX: breast cancer     right   • Hypertension    • Osteoporosis    • Thyroid disorder     hypothyroid   • Thyroid nodule        Past Surgical History:   Procedure Laterality Date   • BREAST BIOPSY Right    • BREAST BIOPSY Left    • BREAST LUMPECTOMY Right    • COLONOSCOPY     • ENDOSCOPY  2019   • ENDOSCOPY  2018   • ENDOSCOPY N/A 2019    Procedure: ESOPHAGOGASTRODUODENOSCOPY with  DILATATION (12-15mm balloon);  Surgeon: Aldair Booker MD;  Location: [facility] ENDOSCOPY;  Service: Gastroenterology   • ENDOSCOPY N/A 12/16/2019    Procedure: ESOPHAGOGASTRODUODENOSCOPY with balloon dilitation 15-18mm) up to 16mm;  Surgeon: Aldair Booker MD;  Location: [facility] ENDOSCOPY;  Service: Gastroenterology   • ENDOSCOPY N/A 7/24/2020    Procedure: ESOPHAGOGASTRODUODENOSCOPY with balloon dilation(12mm-15mm up to 14.5mm) of esophageal anastomosis stricture;  Surgeon: Aldair Booker MD;  Location: [facility] ENDOSCOPY;  Service: Gastroenterology;  Laterality: N/A;  esophageal stricture   • ENDOSCOPY N/A 5/13/2021    Procedure: ESOPHAGOGASTRODUODENOSCOPY WITH BALLOON DILATION UP TO 19MM;  Surgeon: Lily Bettencourt MD;  Location: [facility] ENDOSCOPY;  Service: Gastroenterology;  Laterality: N/A;  ANASTAMOTIC STRICTURE   • ENDOSCOPY N/A 6/24/2021    Procedure: ESOPHAGOGASTRODUODENOSCOPY WITH  DILATATION WITH BALLOON (18-20MM, UP TO 20MM);  Surgeon: Lily Bettencourt MD;  Location: [facility] ENDOSCOPY;  Service: Gastroenterology;  Laterality: N/A;  ESOPHAGOGASTRO ANASTOMOTIC STRICTURE   • ENDOSCOPY N/A 7/22/2021    Procedure: ESOPHAGOGASTRODUodenoscopy;  Surgeon: Lily Bettencourt MD;  Location: [facility] ENDOSCOPY;  Service: Gastroenterology;  Laterality: N/A;  esophageal pleural fistula   • ENDOSCOPY N/A 7/23/2021    Procedure: ESOPHAGOGASTRODUODENOSCOPY with stent placement;  Surgeon: Lily Bettencourt MD;  Location: [facility] OR;  Service: Gastroenterology;  Laterality: N/A;   • ENDOSCOPY W/ STENT PLACEMENT/REMOVAL N/A 10/6/2021    Procedure: ESOPHAGOGASTRODUODENOSCOPY WITH STENT removal and possible replacement;  Surgeon: Lily Bettencourt MD;  Location: [facility] OR;  Service: Gastroenterology;  Laterality: N/A;   • ESOPHAGOGASTRECTOMY  03/04/2019    [place] [facility]   • ESOPHAGUS SURGERY      had a stent placed for hole in esophagus   • JEJUNOSTOMY N/A 7/26/2021    Procedure: OPEN JEJUNOSTOMY TUBE;  Surgeon: Joss  Bulmaro Reed MD;  Location: [facility] OR;  Service: General;  Laterality: N/A;   • LAPAROSCOPIC TUBAL LIGATION     • THORACOTOMY Right 08/17/2018    right vats biopsy of mediastinal mass, right thoracotomy       Family History: family history includes Bone cancer in her mother; Diabetes in her daughter and maternal grandfather; Hypertension in her daughter and mother; Ulcerative colitis in her father. Otherwise pertinent FHx was reviewed and not pertinent to current issue.    Social History:  reports that she quit smoking about 21 years ago. Her smoking use included cigars and cigarettes. She has a 5.00 pack-year smoking history. She has never used smokeless tobacco. She reports previous alcohol use. She reports that she does not use drugs.    Home Medications:  Chlorhexidine Gluconate Cloth, Cyanocobalamin, apixaban, levothyroxine, and metoprolol tartrate    Allergies:  Allergies   Allergen Reactions   • Amoxicillin Rash   • Contrast Dye Rash   • Iodine Rash       Objective    Objective     Vitals:   Temp:  [98.4 °F (36.9 °C)] 98.4 °F (36.9 °C)  Heart Rate:  [80] 80  Resp:  [16] 16  BP: (133)/(72) 133/72    Physical Exam  Vitals and nursing note reviewed.   Constitutional:       Appearance: She is well-developed.   HENT:      Head: Normocephalic and atraumatic.      Nose: Nose normal.   Eyes:      Conjunctiva/sclera: Conjunctivae normal.   Cardiovascular:      Rate and Rhythm: Normal rate.   Pulmonary:      Effort: Pulmonary effort is normal.   Abdominal:      Palpations: Abdomen is soft.   Skin:     General: Skin is warm and dry.   Neurological:      Mental Status: She is alert and oriented to person, place, and time.   Psychiatric:         Behavior: Behavior normal.         Thought Content: Thought content normal.         Judgment: Judgment normal.         Result Review    Result Review:  I have personally reviewed the results from the time of this admission to 12/1/2021 11:48 EST and agree with these  findings:  []  Laboratory  []  Microbiology  []  Radiology  []  EKG/Telemetry   []  Cardiology/Vascular   []  Pathology  []  Old records  []  Other:      Assessment/Plan   Assessment / Plan     Brief Patient Summary:  Camilla Marcos is a 76 y.o. female who has an anastomotic leak treated conservatively with npo and esophageal stent.     Active Hospital Problems:  Active Hospital Problems    Diagnosis    • **Gastro-esophageal reflux disease without esophagitis      Added automatically from request for surgery [id number]       Plan: EGD with stent removal and possible replacement.          Lily Bettencourt MD

## 2021-12-01 NOTE — ANESTHESIA PROCEDURE NOTES
Airway  Urgency: elective    Date/Time: 12/1/2021 12:12 PM  Airway not difficult    General Information and Staff    Patient location during procedure: OR  Anesthesiologist: Ej Palomino MD  CRNA: Perez Miller CRNA    Indications and Patient Condition  Indications for airway management: airway protection    Preoxygenated: yes  Mask difficulty assessment: 1 - vent by mask    Final Airway Details  Final airway type: endotracheal airway      Successful airway: ETT  Cuffed: yes   Successful intubation technique: direct laryngoscopy  Endotracheal tube insertion site: oral  Blade: Giles  Blade size: 2  ETT size (mm): 7.0  Cormack-Lehane Classification: grade I - full view of glottis  Placement verified by: chest auscultation and capnometry   Measured from: lips  ETT/EBT  to lips (cm): 22  Number of attempts at approach: 1  Assessment: lips, teeth, and gum same as pre-op and atraumatic intubation    Additional Comments  Pre 02 100%, SIVI, DL x1, atraumatic intubation, BLBS, Positive ETC02.

## 2021-12-01 NOTE — PERIOPERATIVE NURSING NOTE
Dr. Bettencourt notified of patient's esophogram results. MD also notified of patient receiving oral contrast although she has a reported contrast allergy. She is currently stable and showing no signs of allergic reaction. Patient also has questions about whether she should continue with speech therapy, if she can eat and if she should continue to use her J-tube. MD states that patient should continue with speech therapy until they clear her. She can go home with a clear liquid diet for the next 2-3 days, and if tolerated may advance to full liquids until her follow-up in 2 weeks.

## 2021-12-01 NOTE — ANESTHESIA PREPROCEDURE EVALUATION
Anesthesia Evaluation     Patient summary reviewed and Nursing notes reviewed   NPO Solid Status: > 8 hours  NPO Liquid Status: > 8 hours           Airway   Mallampati: III  Neck ROM: full  No difficulty expected  Dental - normal exam     Pulmonary     breath sounds clear to auscultation  (+) a smoker Former,   Cardiovascular     Rhythm: regular    (+) hypertension, dysrhythmias Tachycardia, Atrial Fib, hyperlipidemia,       Neuro/Psych  (+) psychiatric history Depression and Anxiety,     GI/Hepatic/Renal/Endo    (+)  GERD,  diabetes mellitus,     Musculoskeletal     Abdominal    Substance History      OB/GYN          Other   arthritis,    history of cancer                    Anesthesia Plan    ASA 4     general     intravenous induction     Anesthetic plan, all risks, benefits, and alternatives have been provided, discussed and informed consent has been obtained with: patient.

## 2021-12-02 ENCOUNTER — HOSPITAL ENCOUNTER (OUTPATIENT)
Dept: MAMMOGRAPHY | Facility: HOSPITAL | Age: 76
Discharge: HOME | End: 2021-12-02
Admitting: INTERNAL MEDICINE

## 2021-12-02 DIAGNOSIS — Z12.31 SCREENING MAMMOGRAM, ENCOUNTER FOR: ICD-10-CM

## 2021-12-02 PROCEDURE — 77067 SCR MAMMO BI INCL CAD: CPT

## 2021-12-02 PROCEDURE — 77063 BREAST TOMOSYNTHESIS BI: CPT

## 2021-12-13 ENCOUNTER — OFFICE VISIT (OUTPATIENT)
Dept: SURGERY | Facility: CLINIC | Age: 76
End: 2021-12-13

## 2021-12-13 VITALS
OXYGEN SATURATION: 98 % | SYSTOLIC BLOOD PRESSURE: 158 MMHG | HEART RATE: 83 BPM | DIASTOLIC BLOOD PRESSURE: 82 MMHG | HEIGHT: 68 IN | WEIGHT: 121 LBS | BODY MASS INDEX: 18.34 KG/M2

## 2021-12-13 DIAGNOSIS — K31.6 GASTRIC FISTULA: Primary | ICD-10-CM

## 2021-12-13 DIAGNOSIS — C49.A1 GASTROINTESTINAL STROMAL TUMOR (GIST) OF ESOPHAGUS (HCC): ICD-10-CM

## 2021-12-13 PROCEDURE — 99213 OFFICE O/P EST LOW 20 MIN: CPT | Performed by: THORACIC SURGERY (CARDIOTHORACIC VASCULAR SURGERY)

## 2021-12-13 NOTE — PROGRESS NOTES
"Chief Complaint  GIST tumor, gastric leak  Subjective          Camilla Marcos presents to [facility] THORACIC SURGERY in follow-up.  History of Present Illness  Ms. Marcos is a very pleasant 76-year-old lady presents today in follow-up after undergoing stent removal for conservative management of her gastric conduit leak.  She is doing very well.  She is tolerating a full liquid diet without difficulty.  Objective   Vital Signs:   /82 (BP Location: Left arm, Patient Position: Sitting, Cuff Size: Adult)   Pulse 83   Ht 171.5 cm (67.5\")   Wt 54.9 kg (121 lb)   SpO2 98%   BMI 18.67 kg/m²     Physical Exam  Vitals and nursing note reviewed.   Constitutional:       Appearance: She is well-developed.   HENT:      Head: Normocephalic and atraumatic.      Nose: Nose normal.   Eyes:      Conjunctiva/sclera: Conjunctivae normal.   Cardiovascular:      Rate and Rhythm: Normal rate.   Pulmonary:      Effort: Pulmonary effort is normal.   Abdominal:      Palpations: Abdomen is soft.   Musculoskeletal:      Cervical back: Neck supple.   Skin:     General: Skin is warm and dry.   Neurological:      Mental Status: She is alert and oriented to person, place, and time.   Psychiatric:         Behavior: Behavior normal.         Thought Content: Thought content normal.         Judgment: Judgment normal.        Result Review :                 Assessment and Plan      Ms. Marcos is a very pleasant 76-year-old lady status post conservative treatment of a gastric conduit leak with a stent and feeding tube, n.p.o. for 3 months.  On her last EGD, this has healed without evidence of leak on a follow-up esophagram.  She has advanced to full liquid diet without difficulty.  She will plan to advance to a soft diet and continue her tube feeds for now.  I will plan to see her in 2 to 3 weeks in my [facility] for a weight check to determine if we can stop her tube feeds and remove her feeding tube.  Diagnoses " and all orders for this visit:    1. Gastric fistula (Primary)    2. 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 This time includes time spent by me in the following activities:preparing for the visit, reviewing tests, obtaining and/or reviewing a separately obtained history, performing a medically appropriate examination and/or evaluation , counseling and educating the patient/family/caregiver, ordering medications, tests, or procedures, referring and communicating with other health care professionals , documenting information in the medical record and independently interpreting results and communicating that information with the patient/family/caregiver  Follow Up   No follow-ups on file.  Patient was given instructions and counseling regarding her condition or for health maintenance advice. Please see specific information pulled into the AVS if appropriate.

## 2022-01-04 ENCOUNTER — LAB (OUTPATIENT)
Dept: LAB | Facility: HOSPITAL | Age: 77
End: 2022-01-04

## 2022-01-04 ENCOUNTER — OFFICE VISIT (OUTPATIENT)
Dept: OTHER | Facility: HOSPITAL | Age: 77
End: 2022-01-04

## 2022-01-04 VITALS
DIASTOLIC BLOOD PRESSURE: 96 MMHG | OXYGEN SATURATION: 100 % | SYSTOLIC BLOOD PRESSURE: 145 MMHG | TEMPERATURE: 97.8 F | BODY MASS INDEX: 20.99 KG/M2 | RESPIRATION RATE: 18 BRPM | HEART RATE: 109 BPM | WEIGHT: 136.02 LBS

## 2022-01-04 DIAGNOSIS — K31.6 GASTRIC FISTULA: Primary | ICD-10-CM

## 2022-01-04 DIAGNOSIS — R09.89 RUNNY NOSE: ICD-10-CM

## 2022-01-04 DIAGNOSIS — K21.9 GASTRO-ESOPHAGEAL REFLUX DISEASE WITHOUT ESOPHAGITIS: ICD-10-CM

## 2022-01-04 PROCEDURE — 99214 OFFICE O/P EST MOD 30 MIN: CPT | Performed by: THORACIC SURGERY (CARDIOTHORACIC VASCULAR SURGERY)

## 2022-01-04 PROCEDURE — U0004 COV-19 TEST NON-CDC HGH THRU: HCPCS

## 2022-01-04 PROCEDURE — U0005 INFEC AGEN DETEC AMPLI PROBE: HCPCS

## 2022-01-04 PROCEDURE — C9803 HOPD COVID-19 SPEC COLLECT: HCPCS

## 2022-01-04 NOTE — PROGRESS NOTES
The patient consents to recording with KIAN.    Chief Complaint  GASTRIC FISTULA    Subjective          Camilla Marcos presents to [facility]  History of Present Illness  Camilla Marcos is a pleasant 76-year-old lady status post esophagectomy for a gastrointestinal stromal tumor and subsequent conduit leak. She presents today in follow-up.     Ms. Marcos notes her glucose levels have been erratic since she started eating more recently and with the Christmas holiday. She relates that on 12/24/2021 at 5 p.m. her glucose decreased to 57 mg/dL 1 to 1.5 hours after eating a small [place] meal. She felt better after eating again. Yesterday at 8:15 a.m. her blood glucose level was 74 mg/dL. She continues to do tube feeds, giving herself 5 cans. She states she has not been feeling hungry enough to prepare large meals. She has tried a variety of foods except salad. She weighed 136 pounds this morning. She has a home health nurse. Ms. Marcos has noted some weight gain. She has 2 more visits with her speech therapist and is reportedly doing very well. She states she has not been choking recently. She endorses symptoms of acid reflux and takes omeprazole.    Ms. Marcos is concerned for COVID-19 infection versus allergies. She recently had COVID-19 in 11/2021. She endorses rhinorrhea, significant sneezing, and cough. She notes she is always around her dog and cat. She is interested in receiving the COVID-19 booster vaccine.   Objective   Vital Signs:   /96   Pulse 109   Temp 97.8 °F (36.6 °C)   Resp 18   Wt 61.7 kg (136 lb 0.4 oz)   SpO2 100%   BMI 20.99 kg/m²     Physical Exam  Vitals and nursing note reviewed.   Constitutional:       Appearance: She is well-developed.   HENT:      Head: Normocephalic and atraumatic.      Nose: Nose normal.   Eyes:      Conjunctiva/sclera: Conjunctivae normal.   Cardiovascular:      Rate and Rhythm: Normal rate.   Pulmonary:       Effort: Pulmonary effort is normal.   Abdominal:      Palpations: Abdomen is soft.   Musculoskeletal:      Cervical back: Neck supple.   Skin:     General: Skin is warm and dry.   Neurological:      Mental Status: She is alert and oriented to person, place, and time.   Psychiatric:         Behavior: Behavior normal.         Thought Content: Thought content normal.         Judgment: Judgment normal.          Result Review :                     Assessment and Plan      Camilla Marcos is a pleasant 76-year-old lady status post esophagectomy for a gastrointestinal stromal tumor and subsequent conduit leak. She is recommended to continue monitoring her blood glucose level and to eat if her glucose level is low.  She will need to follow-up with her primary care physician.  I believe we should reduce her tube feedings by half, decreasing from 5 cans to 2.5 or 2 cans at half speed for 2 weeks. In 2 weeks if she has not lost any weight, she can discontinue the tube feeds, and we will consider removing the feeding tube. If she is losing weight, she is to let me know. She is cleared to take her medications by mouth. We discussed she should not need omeprazole given the cutting of the vagus nerve, and that the sensation of reflux is to be expected on occasion given her anatomy. She is advised to monitor her diet and avoid lying flat after eating. She is counseled that she may take 100 mg of metoprolol extended release once per day by mouth. She is advised not to cut this in half and to discuss this prescription with her primary care provider for confirmation. For rhinorrhea, we will order COVID-19 testing. If it is negative, she can proceed with getting the booster vaccine. If it positive, she will need to self-quarantine.  I will plan to see her in 2 weeks for a weight check.    Diagnoses and all orders for this visit:    1. Gastric fistula (Primary)    2. Runny nose  -     COVID-19,APTIMA PANTHER(HELGA),[facility], NP/OP  SWAB IN UTM/VTM/SALINE TRANSPORT MEDIA,24 HR TAT - Swab, Nasal Cavity; Future    3. Gastro-esophageal reflux disease without esophagitis      I spent 30 minutes caring for Camilla on this date of service. This time includes time spent by me in the following activities:preparing for the visit, reviewing tests, obtaining and/or reviewing a separately obtained history, performing a medically appropriate examination and/or evaluation , counseling and educating the patient/family/caregiver, ordering medications, tests, or procedures, documenting information in the medical record and independently interpreting results and communicating that information with the patient/family/caregiver  Follow Up   The patient will follow up in 1 month.    No follow-ups on file.  Patient was given instructions and counseling regarding her condition or for health maintenance advice. Please see specific information pulled into the AVS if appropriate.       Transcribed from ambient dictation for Lily Bettencourt MD by Ursula Wiley.  01/04/22   12:29 EST    Patient verbalized consent to the visit recording.  I have personally performed the services described in this document as transcribed by the above individual, and it is both accurate and complete.  Lily Bettencourt MD  1/10/2022  17:59 EST

## 2022-01-05 LAB — SARS-COV-2 RNA PNL SPEC NAA+PROBE: NOT DETECTED

## 2022-01-20 ENCOUNTER — TRANSCRIBE ORDERS (OUTPATIENT)
Dept: ADMINISTRATIVE | Facility: HOSPITAL | Age: 77
End: 2022-01-20

## 2022-01-20 DIAGNOSIS — D13.1: ICD-10-CM

## 2022-01-20 DIAGNOSIS — J98.4 LUNG DISORDER: Primary | ICD-10-CM

## 2022-01-31 ENCOUNTER — TELEPHONE (OUTPATIENT)
Dept: OTHER | Facility: HOSPITAL | Age: 77
End: 2022-01-31

## 2022-01-31 NOTE — TELEPHONE ENCOUNTER
Dr. Bettencourt will not be in Clinic in [facility] on 02/01/22. Left voicemail for pt to call me back and gave her new appointment details.

## 2022-02-01 ENCOUNTER — APPOINTMENT (OUTPATIENT)
Dept: OTHER | Facility: HOSPITAL | Age: 77
End: 2022-02-01

## 2022-02-07 ENCOUNTER — HOSPITAL ENCOUNTER (OUTPATIENT)
Dept: CT IMAGING | Facility: HOSPITAL | Age: 77
Discharge: HOME | End: 2022-02-07

## 2022-02-07 VITALS
DIASTOLIC BLOOD PRESSURE: 96 MMHG | HEART RATE: 76 BPM | RESPIRATION RATE: 14 BRPM | SYSTOLIC BLOOD PRESSURE: 165 MMHG | OXYGEN SATURATION: 98 %

## 2022-02-07 DIAGNOSIS — J98.4 LUNG DISORDER: ICD-10-CM

## 2022-02-07 DIAGNOSIS — D13.1: ICD-10-CM

## 2022-02-07 LAB
CREAT BLDA-MCNC: 0.7 MG/DL
GLUCOSE BLDC GLUCOMTR-MCNC: 103 MG/DL (ref 70–99)

## 2022-02-07 PROCEDURE — 82565 ASSAY OF CREATININE: CPT

## 2022-02-07 PROCEDURE — 82962 GLUCOSE BLOOD TEST: CPT

## 2022-02-07 PROCEDURE — 71260 CT THORAX DX C+: CPT

## 2022-02-07 PROCEDURE — 0 IOPAMIDOL PER 1 ML: Performed by: INTERNAL MEDICINE

## 2022-02-07 PROCEDURE — 74177 CT ABD & PELVIS W/CONTRAST: CPT

## 2022-02-07 RX ADMIN — IOPAMIDOL 100 ML: 755 INJECTION, SOLUTION INTRAVENOUS at 14:29

## 2022-02-07 NOTE — NURSING NOTE
Radial pulse checked, HR 76. Pt in afibb. Per pt she has a hx of afibb and takes medication for her BP and afibb. Per pt she hasn't had her medicine today. Informed pt when she gets home she needs to take her medication. Pt verbally confirmed. Pt is diabetic. . Pt without complaints at this time. Educated on allergic reaction and anaphylaxis symptoms and to call 911 with any symptoms or trouble breathing. Pt aware. Pt eating granola bar at this time.

## 2022-02-15 ENCOUNTER — OFFICE VISIT (OUTPATIENT)
Dept: OTHER | Facility: HOSPITAL | Age: 77
End: 2022-02-15

## 2022-02-15 VITALS
OXYGEN SATURATION: 98 % | DIASTOLIC BLOOD PRESSURE: 85 MMHG | BODY MASS INDEX: 21.13 KG/M2 | HEART RATE: 79 BPM | RESPIRATION RATE: 18 BRPM | SYSTOLIC BLOOD PRESSURE: 165 MMHG | WEIGHT: 136.91 LBS | TEMPERATURE: 98 F

## 2022-02-15 DIAGNOSIS — C49.A9 MALIGNANT GASTROINTESTINAL STROMAL TUMOR (GIST) OF OTHER SITE: Primary | ICD-10-CM

## 2022-02-15 PROCEDURE — 99213 OFFICE O/P EST LOW 20 MIN: CPT | Performed by: THORACIC SURGERY (CARDIOTHORACIC VASCULAR SURGERY)

## 2022-02-15 NOTE — PROGRESS NOTES
Chief Complaint  Gastric Fistula    Subjective          Camillasamantha Marcos presents to [facility]  History of Present Illness    Miss Marcos is a very pleasant 76-year-old lady status post esophagectomy for GIST tumor with a subsequent esophageal gastric anastomotic leak. She is feeling better. Her stent has been removed and she has no evidence of leak. She is tolerating a regular diet. At her last visit her weight was 136 pounds. She is again 136 pounds today.    The patient reports that her tube feeds stopped approximately 2 weeks ago or longer. She reports that she had lost 2 to 3 pounds in-between her last visit and now.    She reports that they forgot to give her the prednisone. She reports that when she got off the table to go to the bathroom she felt like she had an atrial fibrillation attack. She denies any trouble breathing or breaking out in a rash. She reports that they tested her base and her heart was all over the place.    Objective   Vital Signs:   /85   Pulse 79   Temp 98 °F (36.7 °C)   Resp 18   Wt 62.1 kg (136 lb 14.5 oz)   SpO2 98%   BMI 21.13 kg/m²     Physical Exam  Vitals and nursing note reviewed.   Constitutional:       Appearance: She is well-developed.   HENT:      Head: Normocephalic and atraumatic.      Nose: Nose normal.   Eyes:      Conjunctiva/sclera: Conjunctivae normal.      Pupils: Pupils are equal, round, and reactive to light.   Cardiovascular:      Rate and Rhythm: Normal rate and regular rhythm.      Heart sounds: Normal heart sounds.   Pulmonary:      Effort: Pulmonary effort is normal.      Breath sounds: Normal breath sounds.   Abdominal:      General: Bowel sounds are normal.      Palpations: Abdomen is soft.   Musculoskeletal:         General: Normal range of motion.      Cervical back: Normal range of motion and neck supple.   Skin:     General: Skin is warm and dry.      Capillary Refill: Capillary refill takes less than  2 seconds.   Neurological:      Mental Status: She is alert and oriented to person, place, and time.   Psychiatric:         Behavior: Behavior normal.         Thought Content: Thought content normal.         Judgment: Judgment normal.        Result Review :            I have independently reviewed the CT of the chest, abdomen, and pelvis performed on 02/07/2022 which demonstrates scarring consistent with gastric pull through. There remains a small  portion of extraluminal loculated pneumothorax in the right apex that appears less dense and consolidated. Her anastomosis is patent. There is no evidence of leak.           Assessment and Plan      Miss Marcos is a very pleasant 76-year-old lady status post esophagectomy for GIST tumor with a subsequent esophageal gastric anastomotic leak. I went over the imaging with the patient. She will have her tube removed today in office.  She is doing much better at this time.  She will need continued surveillance which will be ordered by her oncologist.  I will plan to see her in 3 months to check her progress.    Diagnoses and all orders for this visit:    1. 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 This time includes time spent by me in the following activities:preparing for the visit, reviewing tests, obtaining and/or reviewing a separately obtained history, performing a medically appropriate examination and/or evaluation , counseling and educating the patient/family/caregiver, ordering medications, tests, or procedures, documenting information in the medical record and independently interpreting results and communicating that information with the patient/family/caregiver  Follow Up   No follow-ups on file.  Patient was given instructions and counseling regarding her condition or for health maintenance advice. Please see specific information pulled into the AVS if  appropriate.     Transcribed from ambient dictation for Lily Bettencourt MD by Soledad Tarango.  02/15/22   15:04 EST    Patient verbalized consent to the visit recording.  I have personally performed the services described in this document as transcribed by the above individual, and it is both accurate and complete.  Lily Bettencourt MD  2/16/2022  17:58 EST

## 2022-04-24 ENCOUNTER — APPOINTMENT (OUTPATIENT)
Dept: CT IMAGING | Facility: HOSPITAL | Age: 77
End: 2022-04-24

## 2022-04-24 ENCOUNTER — HOSPITAL ENCOUNTER (EMERGENCY)
Facility: HOSPITAL | Age: 77
Discharge: HOME | End: 2022-04-24
Attending: EMERGENCY MEDICINE | Admitting: EMERGENCY MEDICINE

## 2022-04-24 VITALS
RESPIRATION RATE: 18 BRPM | DIASTOLIC BLOOD PRESSURE: 82 MMHG | SYSTOLIC BLOOD PRESSURE: 160 MMHG | HEIGHT: 67 IN | BODY MASS INDEX: 21.94 KG/M2 | TEMPERATURE: 97.6 F | HEART RATE: 75 BPM | WEIGHT: 139.77 LBS | OXYGEN SATURATION: 100 %

## 2022-04-24 DIAGNOSIS — E87.6 HYPOKALEMIA: ICD-10-CM

## 2022-04-24 DIAGNOSIS — N20.1 URETEROLITHIASIS: Primary | ICD-10-CM

## 2022-04-24 LAB
ALBUMIN SERPL-MCNC: 3.9 G/DL (ref 3.5–5.2)
ALBUMIN/GLOB SERPL: 1.3 G/DL
ALP SERPL-CCNC: 85 U/L (ref 39–117)
ALT SERPL W P-5'-P-CCNC: 8 U/L (ref 1–33)
ANION GAP SERPL CALCULATED.3IONS-SCNC: 11.3 MMOL/L (ref 5–15)
AST SERPL-CCNC: 14 U/L (ref 1–32)
BACTERIA UR QL AUTO: ABNORMAL /HPF
BASOPHILS # BLD AUTO: 0.03 10*3/MM3 (ref 0–0.2)
BASOPHILS NFR BLD AUTO: 0.4 % (ref 0–1.5)
BILIRUB SERPL-MCNC: 0.7 MG/DL (ref 0–1.2)
BILIRUB UR QL STRIP: NEGATIVE
BUN SERPL-MCNC: 12 MG/DL (ref 8–23)
BUN/CREAT SERPL: 11.4 (ref 7–25)
CALCIUM SPEC-SCNC: 9.4 MG/DL (ref 8.6–10.5)
CHLORIDE SERPL-SCNC: 101 MMOL/L (ref 98–107)
CLARITY UR: CLEAR
CO2 SERPL-SCNC: 26.7 MMOL/L (ref 22–29)
COLOR UR: YELLOW
CREAT SERPL-MCNC: 1.05 MG/DL (ref 0.57–1)
DEPRECATED RDW RBC AUTO: 42.1 FL (ref 37–54)
EGFRCR SERPLBLD CKD-EPI 2021: 55.2 ML/MIN/1.73
EOSINOPHIL # BLD AUTO: 0.16 10*3/MM3 (ref 0–0.4)
EOSINOPHIL NFR BLD AUTO: 2.2 % (ref 0.3–6.2)
ERYTHROCYTE [DISTWIDTH] IN BLOOD BY AUTOMATED COUNT: 13.9 % (ref 12.3–15.4)
GLOBULIN UR ELPH-MCNC: 3.1 GM/DL
GLUCOSE SERPL-MCNC: 154 MG/DL (ref 65–99)
GLUCOSE UR STRIP-MCNC: NEGATIVE MG/DL
HCT VFR BLD AUTO: 36.7 % (ref 34–46.6)
HGB BLD-MCNC: 11.9 G/DL (ref 12–15.9)
HGB UR QL STRIP.AUTO: ABNORMAL
HOLD SPECIMEN: NORMAL
HOLD SPECIMEN: NORMAL
HYALINE CASTS UR QL AUTO: ABNORMAL /LPF
IMM GRANULOCYTES # BLD AUTO: 0.02 10*3/MM3 (ref 0–0.05)
IMM GRANULOCYTES NFR BLD AUTO: 0.3 % (ref 0–0.5)
KETONES UR QL STRIP: NEGATIVE
LEUKOCYTE ESTERASE UR QL STRIP.AUTO: NEGATIVE
LIPASE SERPL-CCNC: 15 U/L (ref 13–60)
LYMPHOCYTES # BLD AUTO: 1.38 10*3/MM3 (ref 0.7–3.1)
LYMPHOCYTES NFR BLD AUTO: 18.6 % (ref 19.6–45.3)
MCH RBC QN AUTO: 27 PG (ref 26.6–33)
MCHC RBC AUTO-ENTMCNC: 32.4 G/DL (ref 31.5–35.7)
MCV RBC AUTO: 83.2 FL (ref 79–97)
MONOCYTES # BLD AUTO: 0.74 10*3/MM3 (ref 0.1–0.9)
MONOCYTES NFR BLD AUTO: 10 % (ref 5–12)
NEUTROPHILS NFR BLD AUTO: 5.1 10*3/MM3 (ref 1.7–7)
NEUTROPHILS NFR BLD AUTO: 68.5 % (ref 42.7–76)
NITRITE UR QL STRIP: NEGATIVE
NRBC BLD AUTO-RTO: 0 /100 WBC (ref 0–0.2)
PH UR STRIP.AUTO: 6.5 [PH] (ref 5–8)
PLATELET # BLD AUTO: 308 10*3/MM3 (ref 140–450)
PMV BLD AUTO: 10.1 FL (ref 6–12)
POTASSIUM SERPL-SCNC: 3 MMOL/L (ref 3.5–5.2)
PROT SERPL-MCNC: 7 G/DL (ref 6–8.5)
PROT UR QL STRIP: NEGATIVE
RBC # BLD AUTO: 4.41 10*6/MM3 (ref 3.77–5.28)
RBC # UR STRIP: ABNORMAL /HPF
REF LAB TEST METHOD: ABNORMAL
SODIUM SERPL-SCNC: 139 MMOL/L (ref 136–145)
SP GR UR STRIP: 1.01 (ref 1–1.03)
SQUAMOUS #/AREA URNS HPF: ABNORMAL /HPF
UROBILINOGEN UR QL STRIP: ABNORMAL
WBC # UR STRIP: ABNORMAL /HPF
WBC NRBC COR # BLD: 7.43 10*3/MM3 (ref 3.4–10.8)
WHOLE BLOOD HOLD SPECIMEN: NORMAL
WHOLE BLOOD HOLD SPECIMEN: NORMAL

## 2022-04-24 PROCEDURE — 87086 URINE CULTURE/COLONY COUNT: CPT | Performed by: PHYSICIAN ASSISTANT

## 2022-04-24 PROCEDURE — 74176 CT ABD & PELVIS W/O CONTRAST: CPT

## 2022-04-24 PROCEDURE — 83690 ASSAY OF LIPASE: CPT | Performed by: EMERGENCY MEDICINE

## 2022-04-24 PROCEDURE — 36415 COLL VENOUS BLD VENIPUNCTURE: CPT

## 2022-04-24 PROCEDURE — 80053 COMPREHEN METABOLIC PANEL: CPT | Performed by: EMERGENCY MEDICINE

## 2022-04-24 PROCEDURE — 81001 URINALYSIS AUTO W/SCOPE: CPT | Performed by: EMERGENCY MEDICINE

## 2022-04-24 PROCEDURE — 99283 EMERGENCY DEPT VISIT LOW MDM: CPT

## 2022-04-24 PROCEDURE — 85025 COMPLETE CBC W/AUTO DIFF WBC: CPT | Performed by: EMERGENCY MEDICINE

## 2022-04-24 RX ORDER — POTASSIUM CHLORIDE 750 MG/1
10 TABLET, FILM COATED, EXTENDED RELEASE ORAL DAILY
Qty: 3 TABLET | Refills: 0 | Status: SHIPPED | OUTPATIENT
Start: 2022-04-24 | End: 2022-04-27

## 2022-04-24 RX ORDER — HYDROCODONE BITARTRATE AND ACETAMINOPHEN 10; 325 MG/1; MG/1
1 TABLET ORAL EVERY 4 HOURS PRN
Qty: 10 TABLET | Refills: 0 | Status: SHIPPED | OUTPATIENT
Start: 2022-04-24 | End: 2022-06-03

## 2022-04-24 RX ORDER — ONDANSETRON 4 MG/1
4 TABLET, ORALLY DISINTEGRATING ORAL EVERY 8 HOURS PRN
Qty: 12 TABLET | Refills: 0 | Status: SHIPPED | OUTPATIENT
Start: 2022-04-24 | End: 2022-06-03

## 2022-04-24 RX ORDER — SODIUM CHLORIDE 0.9 % (FLUSH) 0.9 %
10 SYRINGE (ML) INJECTION AS NEEDED
Status: DISCONTINUED | OUTPATIENT
Start: 2022-04-24 | End: 2022-04-24 | Stop reason: HOSPADM

## 2022-04-24 RX ORDER — HYDROCODONE BITARTRATE AND ACETAMINOPHEN 10; 325 MG/1; MG/1
1 TABLET ORAL EVERY 6 HOURS PRN
Status: DISCONTINUED | OUTPATIENT
Start: 2022-04-24 | End: 2022-04-24 | Stop reason: HOSPADM

## 2022-04-24 RX ADMIN — HYDROCODONE BITARTRATE AND ACETAMINOPHEN 1 TABLET: 10; 325 TABLET ORAL at 09:23

## 2022-04-24 NOTE — ED PROVIDER NOTES
Subjective   Pt reports left sided flank pain off and on for a few weeks. Seen at  recently and told she had a 10 mm kidney stone. States she has been okay since this time but as of last night pain worsened and she became nauseous. Pain wraps around to front of left abdomen. No hematuria or dysuria. No fever or chills.       History provided by:  Patient  Flank Pain  Pain location:  L flank  Pain quality: aching    Pain radiates to:  Suprapubic region  Pain severity:  Moderate  Onset quality:  Sudden  Duration:  1 day  Timing:  Intermittent  Progression:  Waxing and waning  Chronicity:  New  Associated symptoms: no chills, no fatigue and no fever        Review of Systems   Constitutional: Negative for appetite change, chills, fatigue and fever.   HENT: Negative.    Eyes: Negative.  Negative for photophobia.   Respiratory: Negative.    Gastrointestinal: Negative.    Endocrine: Negative.    Genitourinary: Positive for flank pain.   Skin: Negative.    Allergic/Immunologic: Negative.  Negative for immunocompromised state.   Neurological: Negative.    Hematological: Negative.    Psychiatric/Behavioral: Negative.    All other systems reviewed and are negative.      Past Medical History:   Diagnosis Date   • A-fib ([facility])     dr. Givens - [place]   • Anxiety    • Arthritis    • Breast cancer ([facility])    • COVID-19 virus infection 09/2021   • Delayed emergence from anesthesia     history   • Depression     situational  gets upset when in hospital    • Diabetes ([facility])     diet controlled  has weight loss   • Dysphagia    • Esophageal cancer ([facility]) 2019   • GERD (gastroesophageal reflux disease)    • History of mononucleosis    • History of transfusion     no reaction   • HX: breast cancer 2001    right   • Hypertension    • Osteoporosis    • Thyroid disorder     hypothyroid   • Thyroid nodule        Allergies   Allergen Reactions   • Amoxicillin Rash   • Contrast Dye Rash   • Iodine Rash       Past Surgical History:   Procedure  Laterality Date   • BREAST BIOPSY Right 2001   • BREAST BIOPSY Left 1990   • BREAST LUMPECTOMY Right    • COLONOSCOPY     • ENDOSCOPY  02/06/2019   • ENDOSCOPY  08/06/2018   • ENDOSCOPY N/A 9/16/2019    Procedure: ESOPHAGOGASTRODUODENOSCOPY with DILATATION (12-15mm balloon);  Surgeon: Aldair Booker MD;  Location: [facility] ENDOSCOPY;  Service: Gastroenterology   • ENDOSCOPY N/A 12/16/2019    Procedure: ESOPHAGOGASTRODUODENOSCOPY with balloon dilitation 15-18mm) up to 16mm;  Surgeon: Aldair Booker MD;  Location: [facility] ENDOSCOPY;  Service: Gastroenterology   • ENDOSCOPY N/A 7/24/2020    Procedure: ESOPHAGOGASTRODUODENOSCOPY with balloon dilation(12mm-15mm up to 14.5mm) of esophageal anastomosis stricture;  Surgeon: Aldair Booker MD;  Location: [facility] ENDOSCOPY;  Service: Gastroenterology;  Laterality: N/A;  esophageal stricture   • ENDOSCOPY N/A 5/13/2021    Procedure: ESOPHAGOGASTRODUODENOSCOPY WITH BALLOON DILATION UP TO 19MM;  Surgeon: Lily Bettencourt MD;  Location: [facility] ENDOSCOPY;  Service: Gastroenterology;  Laterality: N/A;  ANASTAMOTIC STRICTURE   • ENDOSCOPY N/A 6/24/2021    Procedure: ESOPHAGOGASTRODUODENOSCOPY WITH  DILATATION WITH BALLOON (18-20MM, UP TO 20MM);  Surgeon: Lily Bettencourt MD;  Location: [facility] ENDOSCOPY;  Service: Gastroenterology;  Laterality: N/A;  ESOPHAGOGASTRO ANASTOMOTIC STRICTURE   • ENDOSCOPY N/A 7/22/2021    Procedure: ESOPHAGOGASTRODUodenoscopy;  Surgeon: Lily Bettencourt MD;  Location: [facility] ENDOSCOPY;  Service: Gastroenterology;  Laterality: N/A;  esophageal pleural fistula   • ENDOSCOPY N/A 7/23/2021    Procedure: ESOPHAGOGASTRODUODENOSCOPY with stent placement;  Surgeon: Lily Bettencourt MD;  Location: [facility] MAIN OR;  Service: Gastroenterology;  Laterality: N/A;   • ENDOSCOPY N/A 12/1/2021    Procedure: ESOPHAGOGASTRODUODENOSCOPY WITH STENT REMOVAL;  Surgeon: Lily Bettencourt MD;  Location: [facility] MAIN OR;  Service: Gastroenterology;  Laterality: N/A;   •  ENDOSCOPY W/ STENT PLACEMENT/REMOVAL N/A 10/6/2021    Procedure: ESOPHAGOGASTRODUODENOSCOPY WITH STENT removal and possible replacement;  Surgeon: Lily Bettencourt MD;  Location: [facility];  Service: Gastroenterology;  Laterality: N/A;   • ESOPHAGOGASTRECTOMY  2019    Royce Sunil   • ESOPHAGUS SURGERY      had a stent placed for hole in esophagus   • JEJUNOSTOMY N/A 2021    Procedure: OPEN JEJUNOSTOMY TUBE;  Surgeon: Bulmaro Coreas Jr., MD;  Location: [facility];  Service: General;  Laterality: N/A;   • LAPAROSCOPIC TUBAL LIGATION     • THORACOTOMY Right 2018    right vats biopsy of mediastinal mass, right thoracotomy       Family History   Problem Relation Age of Onset   • Bone cancer Mother    • Hypertension Mother    • Ulcerative colitis Father    • Diabetes Maternal Grandfather    • Hypertension Daughter    • Diabetes Daughter    • Malig Hyperthermia Neg Hx        Social History     Socioeconomic History   • Marital status:    Tobacco Use   • Smoking status: Former Smoker     Packs/day: 0.25     Years: 20.00     Pack years: 5.00     Types: Cigars, Cigarettes     Quit date:      Years since quittin.3   • Smokeless tobacco: Never Used   Vaping Use   • Vaping Use: Never used   Substance and Sexual Activity   • Alcohol use: Not Currently   • Drug use: Never   • Sexual activity: Defer           Objective   Physical Exam  Vitals and nursing note reviewed.   Constitutional:       General: She is not in acute distress.     Appearance: Normal appearance. She is normal weight. She is not ill-appearing or toxic-appearing.   HENT:      Head: Normocephalic and atraumatic.   Cardiovascular:      Rate and Rhythm: Normal rate and regular rhythm.      Heart sounds: Normal heart sounds.   Pulmonary:      Effort: Pulmonary effort is normal.      Breath sounds: Normal breath sounds.   Abdominal:      General: Abdomen is flat. Bowel sounds are normal.      Palpations: Abdomen is soft.       Tenderness: There is left CVA tenderness.   Musculoskeletal:         General: Normal range of motion.      Cervical back: Normal range of motion.   Neurological:      Mental Status: She is alert and oriented to person, place, and time. Mental status is at baseline.   Psychiatric:         Mood and Affect: Mood normal.         Behavior: Behavior normal.         Thought Content: Thought content normal.         Judgment: Judgment normal.           ED Course  ED Course as of 04/24/22 0941   Sun Apr 24, 2022   0851 Spoke with Dr. Grajeda and would like patient to see him tomorrow in office first thing to set up removal.  [BE]      ED Course User Index  [BE] Poncho Tucker PA          MDM  Number of Diagnoses or Management Options  Hypokalemia  Ureterolithiasis  Diagnosis management comments: Pt is a 76 y.o. female that presents today with Patient presents with:  Flank Pain       Work up today:  Lab Results (last 24 hours)     Procedure Component Value Units Date/Time    CBC & Differential ([id number])  (Abnormal) Collected: 04/24/22 0701    Specimen: Blood Updated: 04/24/22 0708    Narrative:      The following orders were created for panel order CBC & Differential.  Procedure                               Abnormality         Status                       ---------                               -----------         ------                       CBC Auto Differential([id number])        Abnormal            Final result                   Please view results for these tests on the individual orders.    Comprehensive Metabolic Panel ([id number])  (Abnormal) Collected: 04/24/22 0701    Specimen: Blood Updated: 04/24/22 0734     Glucose 154 mg/dL      BUN 12 mg/dL      Creatinine 1.05 mg/dL      Sodium 139 mmol/L      Potassium 3.0 mmol/L      Chloride 101 mmol/L      CO2 26.7 mmol/L      Calcium 9.4 mg/dL      Total Protein 7.0 g/dL      Albumin 3.90 g/dL      ALT (SGPT) 8 U/L      AST (SGOT) 14 U/L      Alkaline Phosphatase 85 U/L       Total Bilirubin 0.7 mg/dL      Globulin 3.1 gm/dL      A/G Ratio 1.3 g/dL      BUN/Creatinine Ratio 11.4     Anion Gap 11.3 mmol/L      eGFR 55.2 mL/min/1.73      Comment: National Kidney Foundation and American Society of Nephrology   (ASN) Task Force recommended calculation based on the Chronic Kidney   Disease Epidemiology Collaboration (CKD-EPI) equation refit without   adjustment for race.       Narrative:      GFR Normal >60  Chronic Kidney Disease <60  Kidney Failure <15      Lipase ([id number])  (Normal) Collected: 04/24/22 0701    Specimen: Blood Updated: 04/24/22 0734     Lipase 15 U/L     CBC Auto Differential ([id number])  (Abnormal) Collected: 04/24/22 0701    Specimen: Blood Updated: 04/24/22 0708     WBC 7.43 10*3/mm3      RBC 4.41 10*6/mm3      Hemoglobin 11.9 g/dL      Hematocrit 36.7 %      MCV 83.2 fL      MCH 27.0 pg      MCHC 32.4 g/dL      RDW 13.9 %      RDW-SD 42.1 fl      MPV 10.1 fL      Platelets 308 10*3/mm3      Neutrophil % 68.5 %      Lymphocyte % 18.6 %      Monocyte % 10.0 %      Eosinophil % 2.2 %      Basophil % 0.4 %      Immature Grans % 0.3 %      Neutrophils, Absolute 5.10 10*3/mm3      Lymphocytes, Absolute 1.38 10*3/mm3      Monocytes, Absolute 0.74 10*3/mm3      Eosinophils, Absolute 0.16 10*3/mm3      Basophils, Absolute 0.03 10*3/mm3      Immature Grans, Absolute 0.02 10*3/mm3      nRBC 0.0 /100 WBC     Urinalysis With Microscopic If Indicated (No Culture) - Urine, Clean   Catch ([id number])  (Abnormal) Collected: 04/24/22 0721    Specimen: Urine, Clean Catch Updated: 04/24/22 0830     Color, UA Yellow     Appearance, UA Clear     pH, UA 6.5     Specific Gravity, UA 1.008     Glucose, UA Negative     Ketones, UA Negative     Bilirubin, UA Negative     Blood, UA Small (1+)     Protein, UA Negative     Leuk Esterase, UA Negative     Nitrite, UA Negative     Urobilinogen, UA 0.2 E.U./dL    Urinalysis, Microscopic Only - Urine, Clean Catch ([id number])  (Abnormal)    Collected: 04/24/22 0721    Specimen: Urine, Clean Catch Updated: 04/24/22 0830     RBC, UA 3-5 /HPF      WBC, UA 0-2 /HPF      Bacteria, UA None Seen /HPF      Squamous Epithelial Cells, UA 0-2 /HPF      Hyaline Casts, UA None Seen /LPF      Methodology Automated Microscopy      CT Abdomen Pelvis Without Contrast    Result Date: 4/24/2022  PROCEDURE: CT ABDOMEN PELVIS WO CONTRAST  COMPARISON: [facility], CT, CT CHEST W CONTRAST DIAGNOSTIC, 2/07/2022, 13:07.  INDICATIONS: flank pain  TECHNIQUE: CT images were created without intravenous contrast.   PROTOCOL:   Standard imaging protocol performed    RADIATION:   DLP: 382.6 mGy*cm   Automated exposure control was utilized to minimize radiation dose.  FINDINGS:  The visualized portions of the lung bases demonstrate no acute process.  There is a small to moderate hiatal hernia.  The liver, pancreas and spleen demonstrate no acute process.  The gallbladder appears unremarkable.  No evidence of biliary dilatation. The adrenal glands appear unremarkable.  The kidneys appear normal in size.  An obstructing calculus is present within the proximal left ureter measuring up to 9 mm in craniocaudal dimension (series 2, image 66).  There is proximal significant hydronephrosis.  Stranding is seen surrounding the left-sided collecting system likely related to increased back pressure.  No additional obstructing calculus identified.  No additional stones present.  Atherosclerotic calcifications are present.  No dilated  loops of bowel identified.  No evidence of obstruction.  No free air.  No mesenteric fluid collections identified.  The appendix appears unremarkable.  A moderate stool burden is present.  No suspicious adenopathy identified.  No significant free fluid.  The pelvic organs demonstrate no acute process.  No acute osseous abnormality is identified.  There is a levo scoliotic curvature present.  Degenerative changes are present within the bilateral hip  joints.         1. Obstructing calculus present within the proximal left ureter measuring up to 9 mm significant proximal hydronephrosis.  Stranding surrounding the left kidney and the left ureter is likely related to increased back pressure.  Superimposed infection cannot be excluded.  No focal parenchymal abnormalities identified.  No additional stones.. 2. Ancillary findings as described above.    KARLA MENJIVAR MD       Electronically Signed and Approved By: KARLA MENJIVAR MD on 4/24/2022 at 7:39              @No orders to display       Pt presents with left sided intermittent flank pain x a few weeks. Work up today shows 9 mm stone. Pain very minimal and only required 1 [place] on d/c.   UA stable and CBC unremarkable. CMP shows mild hypokalemia which pt states she has had issues with in past. Denies CP/palpitations/muscle fatigue. Discussed case with Taras who looked at CT images/diagnostic results and since pain controlled at this time and she does not appear in distress/toxic agreed to see her tomorrow at 8 am to be worked in office schedule and then to get on books for outpatient procedure. Pt agreed with this.   She is able to walk around without much issue and pain minimal during her 2.5 hour stay in ER. Sending home with Norco/Zofran and return precautions.         The patient will pursue further outpatient evaluation with the primary care physician or other designated or consulting physician as outlined in the discharge instructions. They are agreeable to this plan of care and follow-up instructions have been explained in detail. The patient has received these instructions in written format and have expressed an understanding of the discharge instructions. The patient is aware that any significant change in condition or worsening of symptoms should prompt an immediate return to this or the closest emergency department or call to 911.  Pt is otherwise non toxic and non ill appearing and stable for d/c  home.  Pt is in agreement with this.  All questions answered at bedside.          Amount and/or Complexity of Data Reviewed  Clinical lab tests: reviewed  Tests in the radiology section of CPT®: reviewed  Decide to obtain previous medical records or to obtain history from someone other than the patient: yes    Risk of Complications, Morbidity, and/or Mortality  Presenting problems: moderate  Diagnostic procedures: moderate  Management options: moderate    Patient Progress  Patient progress: stable      Final diagnoses:   Ureterolithiasis   Hypokalemia       ED Disposition  ED Disposition     ED Disposition   Discharge    Condition   Stable    Comment   --             Jason Grajeda MD  [address] [facility] [address]  [phone]      please walk in to office to be worked in at 8 am         Medication List      New Prescriptions    HYDROcodone-acetaminophen  MG per tablet  Commonly known as: NORCO  Take 1 tablet by mouth Every 4 (Four) Hours As Needed for Moderate Pain .     ondansetron ODT 4 MG disintegrating tablet  Commonly known as: ZOFRAN-ODT  Place 1 tablet on the tongue Every 8 (Eight) Hours As Needed for Nausea or Vomiting.     potassium chloride 10 MEQ CR tablet  Take 1 tablet by mouth Daily for 3 days.           Where to Get Your Medications      These medications were sent to [facility] [address] [place] [address], [place] - [address] [facility] AT [facility] [address] - [phone]  - [phone] [address] [address]    Phone: [phone]   · HYDROcodone-acetaminophen  MG per tablet  · ondansetron ODT 4 MG disintegrating tablet  · potassium chloride 10 MEQ CR tablet          Poncho Tucker PA  04/24/22 0921

## 2022-04-24 NOTE — DISCHARGE INSTRUCTIONS
Return for uncontrolled pain, fever or chills, or any other symptoms you feel needs to be reevaluated.

## 2022-04-25 ENCOUNTER — PREP FOR SURGERY (OUTPATIENT)
Dept: OTHER | Facility: HOSPITAL | Age: 77
End: 2022-04-25

## 2022-04-25 ENCOUNTER — OFFICE VISIT (OUTPATIENT)
Dept: UROLOGY | Facility: CLINIC | Age: 77
End: 2022-04-25

## 2022-04-25 VITALS — HEIGHT: 67 IN | RESPIRATION RATE: 17 BRPM | WEIGHT: 139 LBS | BODY MASS INDEX: 21.82 KG/M2

## 2022-04-25 DIAGNOSIS — N20.0 NEPHROLITHIASIS: Primary | ICD-10-CM

## 2022-04-25 DIAGNOSIS — N20.1 URETERAL STONE: Primary | ICD-10-CM

## 2022-04-25 LAB — BACTERIA SPEC AEROBE CULT: NORMAL

## 2022-04-25 PROCEDURE — 99203 OFFICE O/P NEW LOW 30 MIN: CPT | Performed by: UROLOGY

## 2022-04-25 RX ORDER — SODIUM CHLORIDE 0.9 % (FLUSH) 0.9 %
10 SYRINGE (ML) INJECTION AS NEEDED
Status: CANCELLED | OUTPATIENT
Start: 2022-04-25

## 2022-04-25 RX ORDER — SODIUM CHLORIDE 9 MG/ML
100 INJECTION, SOLUTION INTRAVENOUS CONTINUOUS
Status: CANCELLED | OUTPATIENT
Start: 2022-04-25

## 2022-04-25 RX ORDER — LEVOFLOXACIN 5 MG/ML
500 INJECTION, SOLUTION INTRAVENOUS ONCE
Status: CANCELLED | OUTPATIENT
Start: 2022-04-25 | End: 2022-04-25

## 2022-04-25 RX ORDER — SODIUM CHLORIDE 0.9 % (FLUSH) 0.9 %
3 SYRINGE (ML) INJECTION EVERY 12 HOURS SCHEDULED
Status: CANCELLED | OUTPATIENT
Start: 2022-04-25

## 2022-04-25 RX ORDER — HYDROCODONE BITARTRATE AND ACETAMINOPHEN 7.5; 325 MG/1; MG/1
1 TABLET ORAL EVERY 6 HOURS PRN
Qty: 20 TABLET | Refills: 0 | Status: SHIPPED | OUTPATIENT
Start: 2022-04-25 | End: 2022-06-03

## 2022-04-25 NOTE — PROGRESS NOTES
Chief Complaint    Urologic complaint    Subjective          Camilla Marcos presents to [facility]  History of Present Illness    76 female      Nephrolithiasis  9 mm proximal left ureteral stone      Patient's been having intermittent severe pain.  9 out of 10 at times.  Also having some nausea.  Left flank pain    Patient did have lower potassium sent out of the ER with potassium prescription she is taking this..     4/24/2022 CT abdomen/pelvis without - 9 mm proximal left stone.  No other stones, images reviewed  4/24/2022 UA-small blood, negative otherwise.   1.0, GFR  55       No history of kidney  stone.    No urologic family history,   no history of urologic surgery.    No CAD.  Atrial fibrillation non-smoker.   Eliquis.    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   Surgical History: has a past surgical history that includes Esophagogastrectomy (03/04/2019); Thoracotomy (Right, 08/17/2018); Breast biopsy (Right, 2001); Laparoscopic tubal ligation; Breast biopsy (Left, 1990); Esophagogastroduodenoscopy (02/06/2019); Esophagogastroduodenoscopy (08/06/2018); Esophagogastroduodenoscopy (N/A, 9/16/2019); Esophagogastroduodenoscopy (N/A, 12/16/2019); Esophagogastroduodenoscopy (N/A, 7/24/2020); Esophagogastroduodenoscopy (N/A, 5/13/2021); Esophagogastroduodenoscopy (N/A, 6/24/2021); Breast lumpectomy (Right); Esophagogastroduodenoscopy (N/A, 7/22/2021); Esophagogastroduodenoscopy (N/A, 7/23/2021); Jejunostomy (N/A, 7/26/2021);  Esophagus surgery; Colonoscopy; ENDOSCOPY W/ STENT PLACEMENT/REMOVAL (N/A, 10/6/2021); and Esophagogastroduodenoscopy (N/A, 12/1/2021).   Family History: family history includes Bone cancer in her mother; Diabetes in her daughter and maternal grandfather; Hypertension in her daughter and mother; Ulcerative colitis in her father.   Social History: reports that she quit smoking about 22 years ago. Her smoking use included cigars and cigarettes. She has a 5.00 pack-year smoking history. She has never used smokeless tobacco. She reports previous alcohol use. She reports that she does not use drugs.  Allergies: Amoxicillin, Contrast dye, and Iodine       Current Outpatient Medications:   •  cetirizine (zyrTEC) 10 MG tablet, Take 10 mg by mouth Daily., Disp: , Rfl:   •  Chlorhexidine Gluconate Cloth 2 % pads, Apply 1 application topically 1 (One) Time. USE AS DIRECTED PREOP, Disp: , Rfl:   •  Cyanocobalamin (VITAMIN B-12 IJ), Inject 1 ampule under the skin into the appropriate area as directed Every 30 (Thirty) Days., Disp: , Rfl:   •  Eliquis 5 MG tablet tablet, Administer 5 mg per J tube Every 12 (Twelve) Hours. PT HOLDING FOR SURGERY, Disp: , Rfl:   •  HYDROcodone-acetaminophen (NORCO)  MG per tablet, Take 1 tablet by mouth Every 4 (Four) Hours As Needed for Moderate Pain ., Disp: 10 tablet, Rfl: 0  •  levothyroxine (SYNTHROID, LEVOTHROID) 25 MCG tablet, Administer 25 mcg per J tube Daily., Disp: , Rfl:   •  metoprolol tartrate (LOPRESSOR) 50 MG tablet, Administer 1 tablet per J tube Every 12 (Twelve) Hours., Disp: 60 tablet, Rfl: 0  •  ondansetron ODT (ZOFRAN-ODT) 4 MG disintegrating tablet, Place 1 tablet on the tongue Every 8 (Eight) Hours As Needed for Nausea or Vomiting., Disp: 12 tablet, Rfl: 0  •  potassium chloride 10 MEQ CR tablet, Take 1 tablet by mouth Daily for 3 days., Disp: 3 tablet, Rfl: 0  No current facility-administered medications for this visit.     Physical exam       Alert and orient  x3  Well appearing, well developed, in no acute distress   Unlabored respirations  Nontender/nondistended      Grossly oriented to person, place and time, judgment is intact, normal mood and affect         Objective     Vital Signs:   There were no vitals taken for this visit.             Assessment and Plan    Diagnoses and all orders for this visit:    1. Nephrolithiasis (Primary)      CT images and read reviewed and discussed with patient      Repeat BMP    Hold Eliquis    Records reviewed and summarized in chart    We will plan on cystoscopy left ureteroscopy with laser and left ureteral stent placement.  Risks and benefits were discussed including bleeding, infection and damage to the urinary system.  We also discussed the risk of anesthesia up to and including death.  Patient voiced understanding and would like to proceed.

## 2022-04-25 NOTE — H&P
[facility]   UROLOGY HISTORY AND PHYSICAL    Patient Name: Camilla Marcos  : 1945  MRN: [id number]  Primary Care Physician:  Homero Ro MD  Date of admission: (Not on file)    Subjective   Subjective     Chief Complaint: Left urolith    HPI:    Camilla Marcos is a 76 y.o. female left ureterolith    Review of Systems     10 systems reviewed and are negative other than what is listed in HPI    Personal History     Past Medical History:   Diagnosis Date   • A-fib (HCC)     dr. Chon Pope   • Anxiety    • Arthritis    • Breast cancer (HCC)    • COVID-19 virus infection 2021   • Delayed emergence from anesthesia     history   • Depression     situational  gets upset when in hospital    • Diabetes (HCC)     diet controlled  has weight loss   • Dysphagia    • Esophageal cancer (HCC)    • GERD (gastroesophageal reflux disease)    • History of mononucleosis    • History of transfusion     no reaction   • HX: breast cancer     right   • Hypertension    • Osteoporosis    • Thyroid disorder     hypothyroid   • Thyroid nodule        Past Surgical History:   Procedure Laterality Date   • BREAST BIOPSY Right    • BREAST BIOPSY Left    • BREAST LUMPECTOMY Right    • COLONOSCOPY     • ENDOSCOPY  2019   • ENDOSCOPY  2018   • ENDOSCOPY N/A 2019    Procedure: ESOPHAGOGASTRODUODENOSCOPY with DILATATION (12-15mm balloon);  Surgeon: Aldair Booker MD;  Location: [facility] ENDOSCOPY;  Service: Gastroenterology   • ENDOSCOPY N/A 2019    Procedure: ESOPHAGOGASTRODUODENOSCOPY with balloon dilitation 15-18mm) up to 16mm;  Surgeon: Aldair Booker MD;  Location: [facility] ENDOSCOPY;  Service: Gastroenterology   • ENDOSCOPY N/A 2020    Procedure: ESOPHAGOGASTRODUODENOSCOPY with balloon dilation(12mm-15mm up to 14.5mm) of esophageal anastomosis stricture;  Surgeon: Aldair Booker MD;  Location: [facility] ENDOSCOPY;  Service: Gastroenterology;  Laterality: N/A;   esophageal stricture   • ENDOSCOPY N/A 5/13/2021    Procedure: ESOPHAGOGASTRODUODENOSCOPY WITH BALLOON DILATION UP TO 19MM;  Surgeon: Lily Bettencourt MD;  Location: [facility] ENDOSCOPY;  Service: Gastroenterology;  Laterality: N/A;  ANASTAMOTIC STRICTURE   • ENDOSCOPY N/A 6/24/2021    Procedure: ESOPHAGOGASTRODUODENOSCOPY WITH  DILATATION WITH BALLOON (18-20MM, UP TO 20MM);  Surgeon: Lily Bettencourt MD;  Location: [facility] ENDOSCOPY;  Service: Gastroenterology;  Laterality: N/A;  ESOPHAGOGASTRO ANASTOMOTIC STRICTURE   • ENDOSCOPY N/A 7/22/2021    Procedure: ESOPHAGOGASTRODUodenoscopy;  Surgeon: Lily Bettencourt MD;  Location: [facility] ENDOSCOPY;  Service: Gastroenterology;  Laterality: N/A;  esophageal pleural fistula   • ENDOSCOPY N/A 7/23/2021    Procedure: ESOPHAGOGASTRODUODENOSCOPY with stent placement;  Surgeon: Lily Bettencourt MD;  Location: [facility] MAIN OR;  Service: Gastroenterology;  Laterality: N/A;   • ENDOSCOPY N/A 12/1/2021    Procedure: ESOPHAGOGASTRODUODENOSCOPY WITH STENT REMOVAL;  Surgeon: Lily Bettencourt MD;  Location: [facility] MAIN OR;  Service: Gastroenterology;  Laterality: N/A;   • ENDOSCOPY W/ STENT PLACEMENT/REMOVAL N/A 10/6/2021    Procedure: ESOPHAGOGASTRODUODENOSCOPY WITH STENT removal and possible replacement;  Surgeon: Lily Bettencourt MD;  Location: [facility] OR;  Service: Gastroenterology;  Laterality: N/A;   • ESOPHAGOGASTRECTOMY  03/04/2019    [place]   • ESOPHAGUS SURGERY      had a stent placed for hole in esophagus   • JEJUNOSTOMY N/A 7/26/2021    Procedure: OPEN JEJUNOSTOMY TUBE;  Surgeon: Bulmaro Coreas Jr., MD;  Location: [facility] MAIN OR;  Service: General;  Laterality: N/A;   • LAPAROSCOPIC TUBAL LIGATION     • THORACOTOMY Right 08/17/2018    right vats biopsy of mediastinal mass, right thoracotomy       Family History: family history includes Bone cancer in her mother; Diabetes in her daughter and maternal grandfather; Hypertension in her daughter and mother; Ulcerative  colitis in her father. Otherwise pertinent FHx was reviewed and not pertinent to current issue.    Social History:  reports that she quit smoking about 22 years ago. Her smoking use included cigars and cigarettes. She has a 5.00 pack-year smoking history. She has never used smokeless tobacco. She reports previous alcohol use. She reports that she does not use drugs.    Home Medications:  Chlorhexidine Gluconate Cloth, Cyanocobalamin, HYDROcodone-acetaminophen, apixaban, cetirizine, levothyroxine, metoprolol tartrate, ondansetron ODT, and potassium chloride      Allergies:  Allergies   Allergen Reactions   • Amoxicillin Rash   • Contrast Dye Rash   • Iodine Rash       Objective   Objective     Vitals:   Heart Rate:  [75] 75  Resp:  [17-18] 17  BP: (160)/(82) 160/82  Physical Exam    Constitutional: Awake, alert    Respiratory: Clear to auscultation bilaterally, nonlabored respirations    Cardiovascular: RRR, no murmurs, rubs, or gallops, palpable pedal pulses bilaterally   Gastrointestinal: Positive bowel sounds, soft, nontender, nondistended    Skin: No rashes     Result Review    Result Review:  I have personally reviewed the results from the time of this admission to 4/25/2022 08:18 EDT and agree with these findings:  []  Laboratory  []  Microbiology  []  Radiology  []  EKG/Telemetry   []  Cardiology/Vascular   []  Pathology  []  Old records  []  Other:    Assessment/Plan   Assessment / Plan     Brief Patient Summary:  Camilla Marcos is a 76 y.o. female     Active Hospital Problems:  There are no active hospital problems to display for this patient.    Left ureteral left    Plan:        cystoscopy left ureteroscopy with laser and left ureteral stent placement.  Risks and benefits were discussed including bleeding, infection and damage to the urinary system.  We also discussed the risk of anesthesia up to and including death.  Patient voiced understanding and would like to proceed.    Electronically signed by  Jason Grajeda MD, 04/25/22, 8:18 AM EDT.

## 2022-04-26 ENCOUNTER — LAB (OUTPATIENT)
Dept: LAB | Facility: HOSPITAL | Age: 77
End: 2022-04-26

## 2022-04-26 ENCOUNTER — TELEPHONE (OUTPATIENT)
Dept: ONCOLOGY | Facility: OTHER | Age: 77
End: 2022-04-26

## 2022-04-26 DIAGNOSIS — N20.0 NEPHROLITHIASIS: ICD-10-CM

## 2022-04-26 LAB
ANION GAP SERPL CALCULATED.3IONS-SCNC: 11.2 MMOL/L (ref 5–15)
BUN SERPL-MCNC: 14 MG/DL (ref 8–23)
BUN/CREAT SERPL: 12.3 (ref 7–25)
CALCIUM SPEC-SCNC: 9.4 MG/DL (ref 8.6–10.5)
CHLORIDE SERPL-SCNC: 100 MMOL/L (ref 98–107)
CO2 SERPL-SCNC: 25.8 MMOL/L (ref 22–29)
CREAT SERPL-MCNC: 1.14 MG/DL (ref 0.57–1)
EGFRCR SERPLBLD CKD-EPI 2021: 50 ML/MIN/1.73
GLUCOSE SERPL-MCNC: 183 MG/DL (ref 65–99)
POTASSIUM SERPL-SCNC: 4.6 MMOL/L (ref 3.5–5.2)
SODIUM SERPL-SCNC: 137 MMOL/L (ref 136–145)

## 2022-04-26 PROCEDURE — 80048 BASIC METABOLIC PNL TOTAL CA: CPT

## 2022-04-26 PROCEDURE — 36415 COLL VENOUS BLD VENIPUNCTURE: CPT

## 2022-04-26 NOTE — TELEPHONE ENCOUNTER
PATIENT CALLED WRONG NUMBER & I TRIED TO GIVE HER DR. HUYNH NUMBER AS THEY WERE NOT OPENED YET BUT SHE STATED SHE HAS & WILL WAIT TO CALL THEM.

## 2022-04-27 ENCOUNTER — APPOINTMENT (OUTPATIENT)
Dept: GENERAL RADIOLOGY | Facility: HOSPITAL | Age: 77
End: 2022-04-27

## 2022-04-27 ENCOUNTER — ANESTHESIA EVENT (OUTPATIENT)
Dept: PERIOP | Facility: HOSPITAL | Age: 77
End: 2022-04-27

## 2022-04-27 ENCOUNTER — ANESTHESIA (OUTPATIENT)
Dept: PERIOP | Facility: HOSPITAL | Age: 77
End: 2022-04-27

## 2022-04-27 ENCOUNTER — HOSPITAL ENCOUNTER (OUTPATIENT)
Facility: HOSPITAL | Age: 77
Discharge: HOME | End: 2022-04-27
Attending: UROLOGY | Admitting: UROLOGY

## 2022-04-27 VITALS
OXYGEN SATURATION: 95 % | TEMPERATURE: 97.7 F | SYSTOLIC BLOOD PRESSURE: 170 MMHG | RESPIRATION RATE: 16 BRPM | HEART RATE: 60 BPM | WEIGHT: 139.33 LBS | BODY MASS INDEX: 21.87 KG/M2 | HEIGHT: 67 IN | DIASTOLIC BLOOD PRESSURE: 64 MMHG

## 2022-04-27 DIAGNOSIS — N20.0 NEPHROLITHIASIS: Primary | ICD-10-CM

## 2022-04-27 DIAGNOSIS — N20.1 URETERAL STONE: ICD-10-CM

## 2022-04-27 LAB
GLUCOSE BLDC GLUCOMTR-MCNC: 120 MG/DL (ref 70–99)
GLUCOSE BLDC GLUCOMTR-MCNC: 124 MG/DL (ref 70–99)
QT INTERVAL: 379 MS

## 2022-04-27 PROCEDURE — C1769 GUIDE WIRE: HCPCS | Performed by: UROLOGY

## 2022-04-27 PROCEDURE — 25010000002 FENTANYL CITRATE (PF) 50 MCG/ML SOLUTION: Performed by: NURSE ANESTHETIST, CERTIFIED REGISTERED

## 2022-04-27 PROCEDURE — 82962 GLUCOSE BLOOD TEST: CPT

## 2022-04-27 PROCEDURE — C2617 STENT, NON-COR, TEM W/O DEL: HCPCS | Performed by: UROLOGY

## 2022-04-27 PROCEDURE — 74018 RADEX ABDOMEN 1 VIEW: CPT

## 2022-04-27 PROCEDURE — 76000 FLUOROSCOPY <1 HR PHYS/QHP: CPT

## 2022-04-27 PROCEDURE — C1758 CATHETER, URETERAL: HCPCS | Performed by: UROLOGY

## 2022-04-27 PROCEDURE — 25010000002 LEVOFLOXACIN PER 250 MG: Performed by: UROLOGY

## 2022-04-27 PROCEDURE — 93010 ELECTROCARDIOGRAM REPORT: CPT | Performed by: SPECIALIST

## 2022-04-27 PROCEDURE — 52356 CYSTO/URETERO W/LITHOTRIPSY: CPT | Performed by: UROLOGY

## 2022-04-27 PROCEDURE — 93005 ELECTROCARDIOGRAM TRACING: CPT | Performed by: ANESTHESIOLOGY

## 2022-04-27 PROCEDURE — 88300 SURGICAL PATH GROSS: CPT | Performed by: UROLOGY

## 2022-04-27 PROCEDURE — 25010000002 PROPOFOL 10 MG/ML EMULSION: Performed by: NURSE ANESTHETIST, CERTIFIED REGISTERED

## 2022-04-27 PROCEDURE — C1894 INTRO/SHEATH, NON-LASER: HCPCS | Performed by: UROLOGY

## 2022-04-27 PROCEDURE — 82365 CALCULUS SPECTROSCOPY: CPT | Performed by: UROLOGY

## 2022-04-27 PROCEDURE — 25010000002 MIDAZOLAM PER 1 MG: Performed by: ANESTHESIOLOGY

## 2022-04-27 DEVICE — STNT URETRL CLASSC DBL PIG 6F 26CM: Type: IMPLANTABLE DEVICE | Site: URETER | Status: FUNCTIONAL

## 2022-04-27 RX ORDER — HYDROCODONE BITARTRATE AND ACETAMINOPHEN 5; 325 MG/1; MG/1
1 TABLET ORAL ONCE AS NEEDED
Status: DISCONTINUED | OUTPATIENT
Start: 2022-04-27 | End: 2022-04-27 | Stop reason: HOSPADM

## 2022-04-27 RX ORDER — MAGNESIUM HYDROXIDE 1200 MG/15ML
LIQUID ORAL AS NEEDED
Status: DISCONTINUED | OUTPATIENT
Start: 2022-04-27 | End: 2022-04-27 | Stop reason: HOSPADM

## 2022-04-27 RX ORDER — ACETAMINOPHEN 325 MG/1
650 TABLET ORAL ONCE
Status: DISCONTINUED | OUTPATIENT
Start: 2022-04-27 | End: 2022-04-27 | Stop reason: HOSPADM

## 2022-04-27 RX ORDER — OXYCODONE HYDROCHLORIDE 5 MG/1
5 TABLET ORAL
Status: DISCONTINUED | OUTPATIENT
Start: 2022-04-27 | End: 2022-04-27 | Stop reason: HOSPADM

## 2022-04-27 RX ORDER — LEVOFLOXACIN 5 MG/ML
500 INJECTION, SOLUTION INTRAVENOUS ONCE
Status: COMPLETED | OUTPATIENT
Start: 2022-04-27 | End: 2022-04-27

## 2022-04-27 RX ORDER — ACETAMINOPHEN 500 MG
1000 TABLET ORAL ONCE
Status: COMPLETED | OUTPATIENT
Start: 2022-04-27 | End: 2022-04-27

## 2022-04-27 RX ORDER — SODIUM CHLORIDE 0.9 % (FLUSH) 0.9 %
10 SYRINGE (ML) INJECTION AS NEEDED
Status: DISCONTINUED | OUTPATIENT
Start: 2022-04-27 | End: 2022-04-27 | Stop reason: HOSPADM

## 2022-04-27 RX ORDER — PHENYLEPHRINE HCL IN 0.9% NACL 1 MG/10 ML
SYRINGE (ML) INTRAVENOUS AS NEEDED
Status: DISCONTINUED | OUTPATIENT
Start: 2022-04-27 | End: 2022-04-27 | Stop reason: SURG

## 2022-04-27 RX ORDER — PROMETHAZINE HYDROCHLORIDE 25 MG/1
25 SUPPOSITORY RECTAL ONCE AS NEEDED
Status: DISCONTINUED | OUTPATIENT
Start: 2022-04-27 | End: 2022-04-27 | Stop reason: HOSPADM

## 2022-04-27 RX ORDER — SODIUM CHLORIDE 0.9 % (FLUSH) 0.9 %
3 SYRINGE (ML) INJECTION EVERY 12 HOURS SCHEDULED
Status: DISCONTINUED | OUTPATIENT
Start: 2022-04-27 | End: 2022-04-27 | Stop reason: HOSPADM

## 2022-04-27 RX ORDER — ONDANSETRON 2 MG/ML
4 INJECTION INTRAMUSCULAR; INTRAVENOUS ONCE AS NEEDED
Status: DISCONTINUED | OUTPATIENT
Start: 2022-04-27 | End: 2022-04-27 | Stop reason: HOSPADM

## 2022-04-27 RX ORDER — SODIUM CHLORIDE, SODIUM LACTATE, POTASSIUM CHLORIDE, CALCIUM CHLORIDE 600; 310; 30; 20 MG/100ML; MG/100ML; MG/100ML; MG/100ML
9 INJECTION, SOLUTION INTRAVENOUS CONTINUOUS PRN
Status: DISCONTINUED | OUTPATIENT
Start: 2022-04-27 | End: 2022-04-27 | Stop reason: HOSPADM

## 2022-04-27 RX ORDER — LIDOCAINE HYDROCHLORIDE 20 MG/ML
INJECTION, SOLUTION EPIDURAL; INFILTRATION; INTRACAUDAL; PERINEURAL AS NEEDED
Status: DISCONTINUED | OUTPATIENT
Start: 2022-04-27 | End: 2022-04-27 | Stop reason: SURG

## 2022-04-27 RX ORDER — PROMETHAZINE HYDROCHLORIDE 12.5 MG/1
25 TABLET ORAL ONCE AS NEEDED
Status: DISCONTINUED | OUTPATIENT
Start: 2022-04-27 | End: 2022-04-27 | Stop reason: HOSPADM

## 2022-04-27 RX ORDER — MIDAZOLAM HYDROCHLORIDE 1 MG/ML
0.5 INJECTION INTRAMUSCULAR; INTRAVENOUS ONCE
Status: COMPLETED | OUTPATIENT
Start: 2022-04-27 | End: 2022-04-27

## 2022-04-27 RX ORDER — FENTANYL CITRATE 50 UG/ML
INJECTION, SOLUTION INTRAMUSCULAR; INTRAVENOUS AS NEEDED
Status: DISCONTINUED | OUTPATIENT
Start: 2022-04-27 | End: 2022-04-27 | Stop reason: SURG

## 2022-04-27 RX ORDER — ONDANSETRON 4 MG/1
4 TABLET, FILM COATED ORAL ONCE AS NEEDED
Status: DISCONTINUED | OUTPATIENT
Start: 2022-04-27 | End: 2022-04-27 | Stop reason: HOSPADM

## 2022-04-27 RX ORDER — PROPOFOL 10 MG/ML
VIAL (ML) INTRAVENOUS AS NEEDED
Status: DISCONTINUED | OUTPATIENT
Start: 2022-04-27 | End: 2022-04-27 | Stop reason: SURG

## 2022-04-27 RX ORDER — PROMETHAZINE HYDROCHLORIDE 12.5 MG/1
12.5 TABLET ORAL ONCE AS NEEDED
Status: DISCONTINUED | OUTPATIENT
Start: 2022-04-27 | End: 2022-04-27 | Stop reason: HOSPADM

## 2022-04-27 RX ORDER — SODIUM CHLORIDE 9 MG/ML
100 INJECTION, SOLUTION INTRAVENOUS CONTINUOUS
Status: DISCONTINUED | OUTPATIENT
Start: 2022-04-27 | End: 2022-04-27 | Stop reason: HOSPADM

## 2022-04-27 RX ADMIN — LEVOFLOXACIN 500 MG: 5 INJECTION, SOLUTION INTRAVENOUS at 13:48

## 2022-04-27 RX ADMIN — LIDOCAINE HYDROCHLORIDE 30 MG: 20 INJECTION, SOLUTION EPIDURAL; INFILTRATION; INTRACAUDAL; PERINEURAL at 13:41

## 2022-04-27 RX ADMIN — SODIUM CHLORIDE, POTASSIUM CHLORIDE, SODIUM LACTATE AND CALCIUM CHLORIDE 9 ML/HR: 600; 310; 30; 20 INJECTION, SOLUTION INTRAVENOUS at 12:16

## 2022-04-27 RX ADMIN — FENTANYL CITRATE 50 MCG: 50 INJECTION, SOLUTION INTRAMUSCULAR; INTRAVENOUS at 13:50

## 2022-04-27 RX ADMIN — Medication 50 MCG: at 13:55

## 2022-04-27 RX ADMIN — FENTANYL CITRATE 25 MCG: 50 INJECTION, SOLUTION INTRAMUSCULAR; INTRAVENOUS at 14:21

## 2022-04-27 RX ADMIN — FENTANYL CITRATE 25 MCG: 50 INJECTION, SOLUTION INTRAMUSCULAR; INTRAVENOUS at 14:00

## 2022-04-27 RX ADMIN — MIDAZOLAM HYDROCHLORIDE 0.5 MG: 1 INJECTION, SOLUTION INTRAMUSCULAR; INTRAVENOUS at 13:02

## 2022-04-27 RX ADMIN — PROPOFOL 100 MG: 10 INJECTION, EMULSION INTRAVENOUS at 13:41

## 2022-04-27 RX ADMIN — ACETAMINOPHEN 1000 MG: 500 TABLET ORAL at 12:16

## 2022-04-27 NOTE — ANESTHESIA PREPROCEDURE EVALUATION
Anesthesia Evaluation     Patient summary reviewed and Nursing notes reviewed                Airway   Mallampati: I  TM distance: >3 FB  Neck ROM: full  No difficulty expected  Dental      Pulmonary - negative pulmonary ROS and normal exam    breath sounds clear to auscultation  Cardiovascular     Rhythm: irregular  Rate: normal    (+) hypertension, dysrhythmias Paroxysmal Atrial Fib, hyperlipidemia,       Neuro/Psych  (+) psychiatric history,    GI/Hepatic/Renal/Endo    (+)  GERD,  renal disease, diabetes mellitus, thyroid problem hypothyroidism    Musculoskeletal     Abdominal    Substance History - negative use     OB/GYN negative ob/gyn ROS         Other   arthritis,    history of cancer                  Anesthesia Plan    ASA 3     general     intravenous induction     Anesthetic plan, all risks, benefits, and alternatives have been provided, discussed and informed consent has been obtained with: patient.        CODE STATUS:

## 2022-04-27 NOTE — ANESTHESIA POSTPROCEDURE EVALUATION
Patient: Camilla Marcos    Procedure Summary     Date: 04/27/22 Room / Location: [facility] OR 07 / [facility] MAIN OR    Anesthesia Start: 1339 Anesthesia Stop: 1428    Procedure: CYSTOSCOPY, LEFT URETEROSCOPY, LASERTRIPSY, STENT INSERTION (Left ) Diagnosis:       Ureteral stone      (Ureteral stone [N20.1])    Surgeons: Jason Grajeda MD Provider: Miguel Steele MD    Anesthesia Type: general ASA Status: 3          Anesthesia Type: general    Vitals  Vitals Value Taken Time   /65 04/27/22 1443   Temp 36.3 °C (97.4 °F) 04/27/22 1425   Pulse 64 04/27/22 1444   Resp 20 04/27/22 1435   SpO2 98 % 04/27/22 1444   Vitals shown include unvalidated device data.        Post Anesthesia Care and Evaluation    Patient location during evaluation: bedside  Patient participation: complete - patient participated  Level of consciousness: awake  Pain management: adequate  Airway patency: patent  Anesthetic complications: No anesthetic complications  PONV Status: none  Cardiovascular status: acceptable and stable  Respiratory status: acceptable  Hydration status: acceptable    Comments: An Anesthesiologist personally participated in the most demanding procedures (including induction and emergence if applicable) in the anesthesia plan, monitored the course of anesthesia administration at frequent intervals and remained physically present and available for immediate diagnosis and treatment of emergencies.

## 2022-04-29 LAB
LAB AP CASE REPORT: NORMAL
LAB AP CLINICAL INFORMATION: NORMAL
PATH REPORT.FINAL DX SPEC: NORMAL
PATH REPORT.GROSS SPEC: NORMAL

## 2022-05-05 LAB
CALCIUM OXALATE DIHYDRATE MFR STONE IR: 10 %
COLOR STONE: NORMAL
COM MFR STONE: 90 %
COMPN STONE: NORMAL
LABORATORY COMMENT REPORT: NORMAL
LABORATORY COMMENT REPORT: NORMAL
Lab: NORMAL
Lab: NORMAL
PHOTO: NORMAL
SIZE STONE: NORMAL MM
SPEC SOURCE SUBJ: NORMAL
WT STONE: 88 MG

## 2022-05-17 ENCOUNTER — OFFICE VISIT (OUTPATIENT)
Dept: OTHER | Facility: HOSPITAL | Age: 77
End: 2022-05-17

## 2022-05-17 VITALS
WEIGHT: 139 LBS | HEIGHT: 67 IN | HEART RATE: 75 BPM | SYSTOLIC BLOOD PRESSURE: 130 MMHG | BODY MASS INDEX: 21.82 KG/M2 | OXYGEN SATURATION: 98 % | DIASTOLIC BLOOD PRESSURE: 82 MMHG

## 2022-05-17 DIAGNOSIS — C49.A9 MALIGNANT GASTROINTESTINAL STROMAL TUMOR (GIST) OF OTHER SITE: Primary | ICD-10-CM

## 2022-05-17 PROCEDURE — 99213 OFFICE O/P EST LOW 20 MIN: CPT | Performed by: THORACIC SURGERY (CARDIOTHORACIC VASCULAR SURGERY)

## 2022-05-19 ENCOUNTER — PREP FOR SURGERY (OUTPATIENT)
Dept: OTHER | Facility: HOSPITAL | Age: 77
End: 2022-05-19

## 2022-05-19 DIAGNOSIS — R22.1 NECK MASS: ICD-10-CM

## 2022-05-19 DIAGNOSIS — R79.1 ABNORMAL COAGULATION PROFILE: ICD-10-CM

## 2022-05-19 DIAGNOSIS — C49.A9 MALIGNANT GASTROINTESTINAL STROMAL TUMOR (GIST) OF OTHER SITE: Primary | ICD-10-CM

## 2022-05-19 RX ORDER — CEFAZOLIN SODIUM 2 G/100ML
2 INJECTION, SOLUTION INTRAVENOUS ONCE
Status: CANCELLED | OUTPATIENT
Start: 2022-05-31 | End: 2022-05-19

## 2022-05-19 RX ORDER — SODIUM CHLORIDE 0.9 % (FLUSH) 0.9 %
3-10 SYRINGE (ML) INJECTION AS NEEDED
Status: CANCELLED | OUTPATIENT
Start: 2022-05-31

## 2022-05-19 RX ORDER — SODIUM CHLORIDE 0.9 % (FLUSH) 0.9 %
3 SYRINGE (ML) INJECTION EVERY 12 HOURS SCHEDULED
Status: CANCELLED | OUTPATIENT
Start: 2022-05-31

## 2022-05-20 PROBLEM — R22.1 NECK MASS: Status: ACTIVE | Noted: 2022-05-20

## 2022-05-31 ENCOUNTER — HOSPITAL ENCOUNTER (OUTPATIENT)
Dept: ULTRASOUND IMAGING | Facility: HOSPITAL | Age: 77
Discharge: HOME | End: 2022-05-31
Admitting: UROLOGY

## 2022-05-31 DIAGNOSIS — N20.0 NEPHROLITHIASIS: ICD-10-CM

## 2022-05-31 PROCEDURE — 76775 US EXAM ABDO BACK WALL LIM: CPT

## 2022-06-02 PROBLEM — E27.8 ADRENAL MASS (HCC): Status: ACTIVE | Noted: 2022-06-02

## 2022-06-02 NOTE — PROGRESS NOTES
Chief Complaint    Urologic complaint    Subjective          Camilla Marcos presents to [facility] UROLOG[facility]  History of Present Illness    76 female      Nephrolithiasis  Adrenal lesion    Stent is out  Doing well, no pain, no gross hematuria no dysuria      5/22 renal ultrasound-3.1 cm right adrenal mass.  No hydronephrosis      4/27/2022 left ureteroscopy-stent with string - all stones removed  4/22 calcium oxalate monohydrate 90, calcium oxalate dihydrate 10        Previous      4/24/2022 CT abdomen/pelvis without - 9 mm proximal left stone.  No other stones, images reviewed.   4/24/2022 UA-small blood, negative otherwise.   1.0, GFR  55       No history of kidney  stone.    No urologic family history,   no history of urologic surgery.    No CAD.  Atrial fibrillation non-smoker.   Eliquis.    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   Surgical History: has a past surgical history that includes Esophagogastrectomy (03/04/2019); Thoracotomy (Right, 08/17/2018); Breast biopsy (Right, 2001); Laparoscopic tubal ligation; Breast biopsy (Left, 1990); Esophagogastroduodenoscopy (02/06/2019); Esophagogastroduodenoscopy (08/06/2018); Esophagogastroduodenoscopy (N/A, 09/16/2019); Esophagogastroduodenoscopy (N/A, 12/16/2019); Esophagogastroduodenoscopy (N/A, 07/24/2020); Esophagogastroduodenoscopy (N/A, 05/13/2021); Esophagogastroduodenoscopy (N/A, 06/24/2021); Breast lumpectomy (Right); Esophagogastroduodenoscopy (N/A, 07/22/2021);  Esophagogastroduodenoscopy (N/A, 07/23/2021); Jejunostomy (N/A, 07/26/2021); Esophagus surgery; Colonoscopy; ENDOSCOPY W/ STENT PLACEMENT/REMOVAL (N/A, 10/06/2021); Esophagogastroduodenoscopy (N/A, 12/01/2021); and cystoscopy ureteroscopy (Left, 4/27/2022).   Family History: family history includes Bone cancer in her mother; Diabetes in her daughter and maternal grandfather; Hypertension in her daughter and mother; Ulcerative colitis in her father.   Social History: reports that she quit smoking about 22 years ago. Her smoking use included cigars and cigarettes. She has a 5.00 pack-year smoking history. She has never used smokeless tobacco. She reports previous alcohol use. She reports that she does not use drugs.  Allergies: Amoxicillin, Contrast dye, and Iodine       Current Outpatient Medications:   •  cetirizine (zyrTEC) 10 MG tablet, Take 10 mg by mouth Daily., Disp: , Rfl:   •  Cyanocobalamin (VITAMIN B-12 IJ), Inject 1 ampule under the skin into the appropriate area as directed Every 30 (Thirty) Days., Disp: , Rfl:   •  Eliquis 5 MG tablet tablet, Administer 5 mg per J tube Every 12 (Twelve) Hours. PT HOLDING FOR SURGERY, Disp: , Rfl:   •  HYDROcodone-acetaminophen (NORCO)  MG per tablet, Take 1 tablet by mouth Every 4 (Four) Hours As Needed for Moderate Pain ., Disp: 10 tablet, Rfl: 0  •  HYDROcodone-acetaminophen (NORCO) 7.5-325 MG per tablet, Take 1 tablet by mouth Every 6 (Six) Hours As Needed for Moderate Pain ., Disp: 20 tablet, Rfl: 0  •  levothyroxine (SYNTHROID, LEVOTHROID) 25 MCG tablet, Administer 25 mcg per J tube Daily., Disp: , Rfl:   •  metoprolol tartrate (LOPRESSOR) 50 MG tablet, Administer 1 tablet per J tube Every 12 (Twelve) Hours., Disp: 60 tablet, Rfl: 0  •  ondansetron ODT (ZOFRAN-ODT) 4 MG disintegrating tablet, Place 1 tablet on the tongue Every 8 (Eight) Hours As Needed for Nausea or Vomiting., Disp: 12 tablet, Rfl: 0     Physical exam       Alert and orient x3  Well appearing,  well developed, in no acute distress   Unlabored respirations      Grossly oriented to person, place and time, judgment is intact, normal mood and affect         Objective     Vital Signs:   There were no vitals taken for this visit.             Assessment and Plan    Diagnoses and all orders for this visit:    1. Nephrolithiasis (Primary)    2. Adrenal mass (HCC)        Nephrolithiasis      The patient was counseled on the preventative measures of kidney stones today.  This included increasing fluid intake to make at least 1.5 ml daily, decreasing meat intake, decreasing salt intake and taking in a normal amount of calcium (1000 mg daily).  Information handout given on this today.    We also discussed the DASH diet today for stone prevention and handout was given        Adrenal mass      Patient with an adrenal adenoma, this has been stable and worked up by medical oncology in the past.  No further work-up needed.    Follow-up as needed.

## 2022-06-03 ENCOUNTER — OFFICE VISIT (OUTPATIENT)
Dept: UROLOGY | Facility: CLINIC | Age: 77
End: 2022-06-03

## 2022-06-03 VITALS — HEIGHT: 67 IN | RESPIRATION RATE: 19 BRPM | WEIGHT: 139 LBS | BODY MASS INDEX: 21.82 KG/M2

## 2022-06-03 DIAGNOSIS — N20.0 NEPHROLITHIASIS: Primary | ICD-10-CM

## 2022-06-03 DIAGNOSIS — E27.8 ADRENAL MASS: ICD-10-CM

## 2022-06-03 PROCEDURE — 99212 OFFICE O/P EST SF 10 MIN: CPT | Performed by: UROLOGY

## 2022-06-27 ENCOUNTER — LAB (OUTPATIENT)
Dept: LAB | Facility: HOSPITAL | Age: 77
End: 2022-06-27

## 2022-06-27 DIAGNOSIS — R22.1 NECK MASS: ICD-10-CM

## 2022-06-27 DIAGNOSIS — R79.1 ABNORMAL COAGULATION PROFILE: ICD-10-CM

## 2022-06-27 LAB
ANION GAP SERPL CALCULATED.3IONS-SCNC: 10.1 MMOL/L (ref 5–15)
APTT PPP: 30.9 SECONDS (ref 24.2–34.2)
BASOPHILS # BLD AUTO: 0.06 10*3/MM3 (ref 0–0.2)
BASOPHILS NFR BLD AUTO: 0.9 % (ref 0–1.5)
BUN SERPL-MCNC: 8 MG/DL (ref 8–23)
BUN/CREAT SERPL: 11.6 (ref 7–25)
CALCIUM SPEC-SCNC: 9.9 MG/DL (ref 8.6–10.5)
CHLORIDE SERPL-SCNC: 105 MMOL/L (ref 98–107)
CO2 SERPL-SCNC: 26.9 MMOL/L (ref 22–29)
CREAT SERPL-MCNC: 0.69 MG/DL (ref 0.57–1)
DEPRECATED RDW RBC AUTO: 40.2 FL (ref 37–54)
EGFRCR SERPLBLD CKD-EPI 2021: 90.1 ML/MIN/1.73
EOSINOPHIL # BLD AUTO: 0.15 10*3/MM3 (ref 0–0.4)
EOSINOPHIL NFR BLD AUTO: 2.3 % (ref 0.3–6.2)
ERYTHROCYTE [DISTWIDTH] IN BLOOD BY AUTOMATED COUNT: 13.5 % (ref 12.3–15.4)
GLUCOSE SERPL-MCNC: 218 MG/DL (ref 65–99)
HCT VFR BLD AUTO: 38.2 % (ref 34–46.6)
HGB BLD-MCNC: 12.3 G/DL (ref 12–15.9)
IMM GRANULOCYTES # BLD AUTO: 0.02 10*3/MM3 (ref 0–0.05)
IMM GRANULOCYTES NFR BLD AUTO: 0.3 % (ref 0–0.5)
INR PPP: 1 (ref 0.86–1.15)
LYMPHOCYTES # BLD AUTO: 1.92 10*3/MM3 (ref 0.7–3.1)
LYMPHOCYTES NFR BLD AUTO: 29.8 % (ref 19.6–45.3)
MCH RBC QN AUTO: 26.8 PG (ref 26.6–33)
MCHC RBC AUTO-ENTMCNC: 32.2 G/DL (ref 31.5–35.7)
MCV RBC AUTO: 83.2 FL (ref 79–97)
MONOCYTES # BLD AUTO: 0.63 10*3/MM3 (ref 0.1–0.9)
MONOCYTES NFR BLD AUTO: 9.8 % (ref 5–12)
NEUTROPHILS NFR BLD AUTO: 3.67 10*3/MM3 (ref 1.7–7)
NEUTROPHILS NFR BLD AUTO: 56.9 % (ref 42.7–76)
NRBC BLD AUTO-RTO: 0 /100 WBC (ref 0–0.2)
PLATELET # BLD AUTO: 340 10*3/MM3 (ref 140–450)
PMV BLD AUTO: 11.1 FL (ref 6–12)
POTASSIUM SERPL-SCNC: 3.3 MMOL/L (ref 3.5–5.2)
PROTHROMBIN TIME: 13.3 SECONDS (ref 11.8–14.9)
RBC # BLD AUTO: 4.59 10*6/MM3 (ref 3.77–5.28)
SARS-COV-2 RNA PNL SPEC NAA+PROBE: NOT DETECTED
SODIUM SERPL-SCNC: 142 MMOL/L (ref 136–145)
WBC NRBC COR # BLD: 6.45 10*3/MM3 (ref 3.4–10.8)

## 2022-06-27 PROCEDURE — C9803 HOPD COVID-19 SPEC COLLECT: HCPCS

## 2022-06-27 PROCEDURE — U0004 COV-19 TEST NON-CDC HGH THRU: HCPCS

## 2022-06-27 PROCEDURE — 85730 THROMBOPLASTIN TIME PARTIAL: CPT

## 2022-06-27 PROCEDURE — 80048 BASIC METABOLIC PNL TOTAL CA: CPT

## 2022-06-27 PROCEDURE — 36415 COLL VENOUS BLD VENIPUNCTURE: CPT

## 2022-06-27 PROCEDURE — U0005 INFEC AGEN DETEC AMPLI PROBE: HCPCS

## 2022-06-27 PROCEDURE — 85025 COMPLETE CBC W/AUTO DIFF WBC: CPT

## 2022-06-27 PROCEDURE — 85610 PROTHROMBIN TIME: CPT

## 2022-06-28 ENCOUNTER — ANESTHESIA (OUTPATIENT)
Dept: PERIOP | Facility: HOSPITAL | Age: 77
End: 2022-06-28

## 2022-06-28 ENCOUNTER — ANESTHESIA EVENT (OUTPATIENT)
Dept: PERIOP | Facility: HOSPITAL | Age: 77
End: 2022-06-28

## 2022-06-28 ENCOUNTER — HOSPITAL ENCOUNTER (OUTPATIENT)
Facility: HOSPITAL | Age: 77
Setting detail: HOSPITAL OUTPATIENT SURGERY
Discharge: HOME | End: 2022-06-28
Attending: THORACIC SURGERY (CARDIOTHORACIC VASCULAR SURGERY) | Admitting: THORACIC SURGERY (CARDIOTHORACIC VASCULAR SURGERY)

## 2022-06-28 VITALS
DIASTOLIC BLOOD PRESSURE: 64 MMHG | HEART RATE: 69 BPM | WEIGHT: 139.99 LBS | RESPIRATION RATE: 18 BRPM | BODY MASS INDEX: 21.93 KG/M2 | SYSTOLIC BLOOD PRESSURE: 148 MMHG | TEMPERATURE: 97.8 F | OXYGEN SATURATION: 100 %

## 2022-06-28 DIAGNOSIS — R22.1 NECK MASS: ICD-10-CM

## 2022-06-28 LAB — GLUCOSE BLDC GLUCOMTR-MCNC: 134 MG/DL (ref 70–130)

## 2022-06-28 PROCEDURE — 87185 SC STD ENZYME DETCJ PER NZM: CPT | Performed by: THORACIC SURGERY (CARDIOTHORACIC VASCULAR SURGERY)

## 2022-06-28 PROCEDURE — 87075 CULTR BACTERIA EXCEPT BLOOD: CPT | Performed by: THORACIC SURGERY (CARDIOTHORACIC VASCULAR SURGERY)

## 2022-06-28 PROCEDURE — 87070 CULTURE OTHR SPECIMN AEROBIC: CPT | Performed by: THORACIC SURGERY (CARDIOTHORACIC VASCULAR SURGERY)

## 2022-06-28 PROCEDURE — 87176 TISSUE HOMOGENIZATION CULTR: CPT | Performed by: THORACIC SURGERY (CARDIOTHORACIC VASCULAR SURGERY)

## 2022-06-28 PROCEDURE — S0260 H&P FOR SURGERY: HCPCS | Performed by: THORACIC SURGERY (CARDIOTHORACIC VASCULAR SURGERY)

## 2022-06-28 PROCEDURE — 25010000002 HYDRALAZINE PER 20 MG: Performed by: NURSE ANESTHETIST, CERTIFIED REGISTERED

## 2022-06-28 PROCEDURE — 87205 SMEAR GRAM STAIN: CPT | Performed by: THORACIC SURGERY (CARDIOTHORACIC VASCULAR SURGERY)

## 2022-06-28 PROCEDURE — 87102 FUNGUS ISOLATION CULTURE: CPT | Performed by: THORACIC SURGERY (CARDIOTHORACIC VASCULAR SURGERY)

## 2022-06-28 PROCEDURE — 25010000002 DEXAMETHASONE PER 1 MG: Performed by: NURSE ANESTHETIST, CERTIFIED REGISTERED

## 2022-06-28 PROCEDURE — 87206 SMEAR FLUORESCENT/ACID STAI: CPT | Performed by: THORACIC SURGERY (CARDIOTHORACIC VASCULAR SURGERY)

## 2022-06-28 PROCEDURE — 87076 CULTURE ANAEROBE IDENT EACH: CPT | Performed by: THORACIC SURGERY (CARDIOTHORACIC VASCULAR SURGERY)

## 2022-06-28 PROCEDURE — 25010000002 ONDANSETRON PER 1 MG: Performed by: NURSE ANESTHETIST, CERTIFIED REGISTERED

## 2022-06-28 PROCEDURE — 25010000002 PROPOFOL 10 MG/ML EMULSION: Performed by: NURSE ANESTHETIST, CERTIFIED REGISTERED

## 2022-06-28 PROCEDURE — 25010000002 CEFAZOLIN IN DEXTROSE 2-4 GM/100ML-% SOLUTION: Performed by: THORACIC SURGERY (CARDIOTHORACIC VASCULAR SURGERY)

## 2022-06-28 PROCEDURE — 25010000002 FENTANYL CITRATE (PF) 50 MCG/ML SOLUTION: Performed by: NURSE ANESTHETIST, CERTIFIED REGISTERED

## 2022-06-28 PROCEDURE — 87116 MYCOBACTERIA CULTURE: CPT | Performed by: THORACIC SURGERY (CARDIOTHORACIC VASCULAR SURGERY)

## 2022-06-28 PROCEDURE — 82962 GLUCOSE BLOOD TEST: CPT

## 2022-06-28 PROCEDURE — 88304 TISSUE EXAM BY PATHOLOGIST: CPT | Performed by: THORACIC SURGERY (CARDIOTHORACIC VASCULAR SURGERY)

## 2022-06-28 RX ORDER — OXYCODONE HYDROCHLORIDE 5 MG/1
5-10 TABLET ORAL EVERY 4 HOURS PRN
Qty: 5 TABLET | Refills: 0 | Status: SHIPPED | OUTPATIENT
Start: 2022-06-28 | End: 2022-08-20

## 2022-06-28 RX ORDER — EPHEDRINE SULFATE 50 MG/ML
5 INJECTION, SOLUTION INTRAVENOUS ONCE AS NEEDED
Status: DISCONTINUED | OUTPATIENT
Start: 2022-06-28 | End: 2022-06-28 | Stop reason: HOSPADM

## 2022-06-28 RX ORDER — FAMOTIDINE 10 MG/ML
20 INJECTION, SOLUTION INTRAVENOUS ONCE
Status: COMPLETED | OUTPATIENT
Start: 2022-06-28 | End: 2022-06-28

## 2022-06-28 RX ORDER — SODIUM CHLORIDE 0.9 % (FLUSH) 0.9 %
3 SYRINGE (ML) INJECTION EVERY 12 HOURS SCHEDULED
Status: DISCONTINUED | OUTPATIENT
Start: 2022-06-28 | End: 2022-06-28 | Stop reason: HOSPADM

## 2022-06-28 RX ORDER — EPHEDRINE SULFATE 50 MG/ML
INJECTION, SOLUTION INTRAVENOUS AS NEEDED
Status: DISCONTINUED | OUTPATIENT
Start: 2022-06-28 | End: 2022-06-28 | Stop reason: SURG

## 2022-06-28 RX ORDER — HYDROCODONE BITARTRATE AND ACETAMINOPHEN 7.5; 325 MG/1; MG/1
1 TABLET ORAL ONCE AS NEEDED
Status: DISCONTINUED | OUTPATIENT
Start: 2022-06-28 | End: 2022-06-28 | Stop reason: HOSPADM

## 2022-06-28 RX ORDER — PROPOFOL 10 MG/ML
VIAL (ML) INTRAVENOUS AS NEEDED
Status: DISCONTINUED | OUTPATIENT
Start: 2022-06-28 | End: 2022-06-28 | Stop reason: SURG

## 2022-06-28 RX ORDER — SODIUM CHLORIDE, SODIUM LACTATE, POTASSIUM CHLORIDE, CALCIUM CHLORIDE 600; 310; 30; 20 MG/100ML; MG/100ML; MG/100ML; MG/100ML
9 INJECTION, SOLUTION INTRAVENOUS CONTINUOUS
Status: DISCONTINUED | OUTPATIENT
Start: 2022-06-28 | End: 2022-06-28 | Stop reason: HOSPADM

## 2022-06-28 RX ORDER — APIXABAN 5 MG/1
5 TABLET, FILM COATED ORAL EVERY 12 HOURS SCHEDULED
Qty: 60 TABLET | Refills: 6 | Status: SHIPPED | OUTPATIENT
Start: 2022-06-28

## 2022-06-28 RX ORDER — SODIUM CHLORIDE 0.9 % (FLUSH) 0.9 %
3-10 SYRINGE (ML) INJECTION AS NEEDED
Status: DISCONTINUED | OUTPATIENT
Start: 2022-06-28 | End: 2022-06-28 | Stop reason: HOSPADM

## 2022-06-28 RX ORDER — CEFAZOLIN SODIUM 2 G/100ML
2 INJECTION, SOLUTION INTRAVENOUS ONCE
Status: COMPLETED | OUTPATIENT
Start: 2022-06-28 | End: 2022-06-28

## 2022-06-28 RX ORDER — OXYCODONE AND ACETAMINOPHEN 7.5; 325 MG/1; MG/1
1 TABLET ORAL EVERY 4 HOURS PRN
Status: DISCONTINUED | OUTPATIENT
Start: 2022-06-28 | End: 2022-06-28 | Stop reason: HOSPADM

## 2022-06-28 RX ORDER — MIDAZOLAM HYDROCHLORIDE 1 MG/ML
0.5 INJECTION INTRAMUSCULAR; INTRAVENOUS
Status: DISCONTINUED | OUTPATIENT
Start: 2022-06-28 | End: 2022-06-28 | Stop reason: HOSPADM

## 2022-06-28 RX ORDER — LIDOCAINE HYDROCHLORIDE 20 MG/ML
INJECTION, SOLUTION INFILTRATION; PERINEURAL AS NEEDED
Status: DISCONTINUED | OUTPATIENT
Start: 2022-06-28 | End: 2022-06-28 | Stop reason: SURG

## 2022-06-28 RX ORDER — ONDANSETRON 2 MG/ML
4 INJECTION INTRAMUSCULAR; INTRAVENOUS ONCE AS NEEDED
Status: COMPLETED | OUTPATIENT
Start: 2022-06-28 | End: 2022-06-28

## 2022-06-28 RX ORDER — LIDOCAINE HYDROCHLORIDE 10 MG/ML
INJECTION, SOLUTION EPIDURAL; INFILTRATION; INTRACAUDAL; PERINEURAL AS NEEDED
Status: DISCONTINUED | OUTPATIENT
Start: 2022-06-28 | End: 2022-06-28 | Stop reason: HOSPADM

## 2022-06-28 RX ORDER — PROMETHAZINE HYDROCHLORIDE 25 MG/1
25 SUPPOSITORY RECTAL ONCE AS NEEDED
Status: DISCONTINUED | OUTPATIENT
Start: 2022-06-28 | End: 2022-06-28 | Stop reason: HOSPADM

## 2022-06-28 RX ORDER — DIPHENHYDRAMINE HCL 25 MG
25 CAPSULE ORAL
Status: DISCONTINUED | OUTPATIENT
Start: 2022-06-28 | End: 2022-06-28 | Stop reason: HOSPADM

## 2022-06-28 RX ORDER — MAGNESIUM HYDROXIDE 1200 MG/15ML
LIQUID ORAL AS NEEDED
Status: DISCONTINUED | OUTPATIENT
Start: 2022-06-28 | End: 2022-06-28 | Stop reason: HOSPADM

## 2022-06-28 RX ORDER — SODIUM CHLORIDE 0.9 % (FLUSH) 0.9 %
10 SYRINGE (ML) INJECTION AS NEEDED
Status: DISCONTINUED | OUTPATIENT
Start: 2022-06-28 | End: 2022-06-28 | Stop reason: HOSPADM

## 2022-06-28 RX ORDER — HYDRALAZINE HYDROCHLORIDE 20 MG/ML
5 INJECTION INTRAMUSCULAR; INTRAVENOUS
Status: DISCONTINUED | OUTPATIENT
Start: 2022-06-28 | End: 2022-06-28 | Stop reason: HOSPADM

## 2022-06-28 RX ORDER — IBUPROFEN 600 MG/1
600 TABLET ORAL ONCE AS NEEDED
Status: DISCONTINUED | OUTPATIENT
Start: 2022-06-28 | End: 2022-06-28 | Stop reason: HOSPADM

## 2022-06-28 RX ORDER — HYDROMORPHONE HYDROCHLORIDE 1 MG/ML
0.5 INJECTION, SOLUTION INTRAMUSCULAR; INTRAVENOUS; SUBCUTANEOUS
Status: DISCONTINUED | OUTPATIENT
Start: 2022-06-28 | End: 2022-06-28 | Stop reason: HOSPADM

## 2022-06-28 RX ORDER — PROMETHAZINE HYDROCHLORIDE 25 MG/1
25 TABLET ORAL ONCE AS NEEDED
Status: DISCONTINUED | OUTPATIENT
Start: 2022-06-28 | End: 2022-06-28 | Stop reason: HOSPADM

## 2022-06-28 RX ORDER — LABETALOL HYDROCHLORIDE 5 MG/ML
5 INJECTION, SOLUTION INTRAVENOUS
Status: DISCONTINUED | OUTPATIENT
Start: 2022-06-28 | End: 2022-06-28 | Stop reason: HOSPADM

## 2022-06-28 RX ORDER — FLUMAZENIL 0.1 MG/ML
0.2 INJECTION INTRAVENOUS AS NEEDED
Status: DISCONTINUED | OUTPATIENT
Start: 2022-06-28 | End: 2022-06-28 | Stop reason: HOSPADM

## 2022-06-28 RX ORDER — SODIUM CHLORIDE 0.9 % (FLUSH) 0.9 %
10 SYRINGE (ML) INJECTION EVERY 12 HOURS SCHEDULED
Status: DISCONTINUED | OUTPATIENT
Start: 2022-06-28 | End: 2022-06-28 | Stop reason: HOSPADM

## 2022-06-28 RX ORDER — ONDANSETRON 2 MG/ML
INJECTION INTRAMUSCULAR; INTRAVENOUS AS NEEDED
Status: DISCONTINUED | OUTPATIENT
Start: 2022-06-28 | End: 2022-06-28 | Stop reason: SURG

## 2022-06-28 RX ORDER — FENTANYL CITRATE 50 UG/ML
INJECTION, SOLUTION INTRAMUSCULAR; INTRAVENOUS AS NEEDED
Status: DISCONTINUED | OUTPATIENT
Start: 2022-06-28 | End: 2022-06-28 | Stop reason: SURG

## 2022-06-28 RX ORDER — NALOXONE HCL 0.4 MG/ML
0.2 VIAL (ML) INJECTION AS NEEDED
Status: DISCONTINUED | OUTPATIENT
Start: 2022-06-28 | End: 2022-06-28 | Stop reason: HOSPADM

## 2022-06-28 RX ORDER — DIPHENHYDRAMINE HYDROCHLORIDE 50 MG/ML
12.5 INJECTION INTRAMUSCULAR; INTRAVENOUS
Status: DISCONTINUED | OUTPATIENT
Start: 2022-06-28 | End: 2022-06-28 | Stop reason: HOSPADM

## 2022-06-28 RX ORDER — FENTANYL CITRATE 50 UG/ML
50 INJECTION, SOLUTION INTRAMUSCULAR; INTRAVENOUS
Status: DISCONTINUED | OUTPATIENT
Start: 2022-06-28 | End: 2022-06-28 | Stop reason: HOSPADM

## 2022-06-28 RX ORDER — DEXAMETHASONE SODIUM PHOSPHATE 10 MG/ML
INJECTION INTRAMUSCULAR; INTRAVENOUS AS NEEDED
Status: DISCONTINUED | OUTPATIENT
Start: 2022-06-28 | End: 2022-06-28 | Stop reason: SURG

## 2022-06-28 RX ORDER — ROCURONIUM BROMIDE 10 MG/ML
INJECTION, SOLUTION INTRAVENOUS AS NEEDED
Status: DISCONTINUED | OUTPATIENT
Start: 2022-06-28 | End: 2022-06-28 | Stop reason: SURG

## 2022-06-28 RX ADMIN — DEXAMETHASONE SODIUM PHOSPHATE 6 MG: 10 INJECTION INTRAMUSCULAR; INTRAVENOUS at 08:05

## 2022-06-28 RX ADMIN — FENTANYL CITRATE 50 MCG: 50 INJECTION INTRAMUSCULAR; INTRAVENOUS at 09:00

## 2022-06-28 RX ADMIN — LABETALOL HYDROCHLORIDE 5 MG: 5 INJECTION, SOLUTION INTRAVENOUS at 08:35

## 2022-06-28 RX ADMIN — ROCURONIUM BROMIDE 30 MG: 50 INJECTION INTRAVENOUS at 07:42

## 2022-06-28 RX ADMIN — FAMOTIDINE 20 MG: 10 INJECTION, SOLUTION INTRAVENOUS at 06:54

## 2022-06-28 RX ADMIN — PROPOFOL 150 MG: 10 INJECTION, EMULSION INTRAVENOUS at 07:42

## 2022-06-28 RX ADMIN — SODIUM CHLORIDE, POTASSIUM CHLORIDE, SODIUM LACTATE AND CALCIUM CHLORIDE 9 ML/HR: 600; 310; 30; 20 INJECTION, SOLUTION INTRAVENOUS at 06:54

## 2022-06-28 RX ADMIN — ONDANSETRON 4 MG: 2 INJECTION INTRAMUSCULAR; INTRAVENOUS at 08:05

## 2022-06-28 RX ADMIN — LABETALOL HYDROCHLORIDE 5 MG: 5 INJECTION, SOLUTION INTRAVENOUS at 08:42

## 2022-06-28 RX ADMIN — ONDANSETRON 4 MG: 2 INJECTION INTRAMUSCULAR; INTRAVENOUS at 10:04

## 2022-06-28 RX ADMIN — FENTANYL CITRATE 50 MCG: 50 INJECTION INTRAMUSCULAR; INTRAVENOUS at 07:42

## 2022-06-28 RX ADMIN — SUGAMMADEX 200 MG: 100 INJECTION, SOLUTION INTRAVENOUS at 08:17

## 2022-06-28 RX ADMIN — FENTANYL CITRATE 50 MCG: 50 INJECTION INTRAMUSCULAR; INTRAVENOUS at 07:54

## 2022-06-28 RX ADMIN — LIDOCAINE HYDROCHLORIDE 60 MG: 20 INJECTION, SOLUTION INFILTRATION; PERINEURAL at 07:42

## 2022-06-28 RX ADMIN — CEFAZOLIN SODIUM 2 G: 2 INJECTION, SOLUTION INTRAVENOUS at 07:27

## 2022-06-28 RX ADMIN — EPHEDRINE SULFATE 10 MG: 50 INJECTION INTRAVENOUS at 07:58

## 2022-06-28 RX ADMIN — HYDRALAZINE HYDROCHLORIDE 5 MG: 20 INJECTION INTRAMUSCULAR; INTRAVENOUS at 09:32

## 2022-06-28 NOTE — OP NOTE
HEAD NECK ABSCESS INCISION AND DRAINAGE  Procedure Report    Patient Name:  Camilla Marcos  YOB: 1945    Date of Surgery:  6/28/2022     Indications:  Neck mass    Pre-op Diagnosis:   Neck mass [R22.1]       Post-Op Diagnosis Codes:     * Neck mass [R22.1]   Cystic lesion, likely sebaceous cyst    Procedure/CPT® Codes:      Procedure(s):  Excision of a superficial neck mass    Staff:  Surgeon(s):  Lily Bettencourt MD    Anesthesia: General    Estimated Blood Loss: 5 mL    Implants:    Nothing was implanted during the procedure    Specimen:          Specimens     ID Source Type Tests Collected By Collected At Frozen?    1 Neck Tissue · ANAEROBIC CULTURE  · FUNGAL CULTURE  · TISSUE / BONE CULTURE  · AFB CULTURE   Lily Bettencourt MD 6/28/22 0805     Description: neck mass culture for anaerobic, aerobic, fungal and afb    A Neck Tissue · TISSUE PATHOLOGY EXAM   Lily Bettencourt MD 6/28/22 0809     Description: NECK MASS    This specimen was not marked as sent.            Findings: cystic lesion in the anterior neck consistent with sebaceous cyst    Complications: None apparent    Description of Procedure: Ms. Marcos was identified in the preoperative holding area and her consent was obtained.  She was transported to the operating room and placed on the operating table in supine position.  A general anesthetic was successfully administered and she was prepped and draped in standard sterile fashion.  A timeout was performed per protocol.      A 3 cm incision was made over the mass and dissection was carried down to the mass and a copious amount of white keratinous debris was removed from the cyst. The cyst wall was excised from the surrounding tissue sharply.  The entirety of the cyst wall was removed.     The cavity was irrigated.  The incision was closed with deep vicryl sutures and a subcuticular Monocryl.  The patient tolerated this procedure well and was transported to the recovery room in  stable condition.         Lily Bettencourt MD     Date: 6/28/2022  Time: 08:54 EDT

## 2022-06-28 NOTE — ANESTHESIA PROCEDURE NOTES
Airway  Urgency: elective    Date/Time: 6/28/2022 7:43 AM  Airway not difficult    General Information and Staff    Patient location during procedure: OR  Anesthesiologist: Ej Palomino MD  CRNA/CAA: Liang Mcdowell CRNA    Indications and Patient Condition  Indications for airway management: airway protection    Preoxygenated: yes  MILS maintained throughout  Mask difficulty assessment: 1 - vent by mask    Final Airway Details  Final airway type: endotracheal airway      Successful airway: ETT  Cuffed: yes   Successful intubation technique: direct laryngoscopy  Facilitating devices/methods: cricoid pressure  Endotracheal tube insertion site: oral  Blade: Alexis  Blade size: 3  ETT size (mm): 7.0  Cormack-Lehane Classification: grade I - full view of glottis  Placement verified by: chest auscultation and capnometry   Measured from: teeth  ETT/EBT  to teeth (cm): 21  Number of attempts at approach: 1  Assessment: lips, teeth, and gum same as pre-op and atraumatic intubation

## 2022-06-28 NOTE — H&P
[facility]   PREOPERATIVE HISTORY AND PHYSICAL    Patient Name:Camilla Marcos  : 1945  MRN: [id number]  Primary Care Physician: Homero Ro MD  Date of admission: 2022    Subjective   Subjective     Chief Complaint: neck mass    History of Present Illness  Camilla Marcos is a 76 y.o. female who presents for preoperative evaluation. She is scheduled for Excision of a superficial neck mass (N/A)    Review of Systems   Constitutional: Negative.    HENT: Negative.    Eyes: Negative.    Respiratory: Negative.    Cardiovascular: Negative.    Gastrointestinal: Negative.    Endocrine: Negative.    Genitourinary: Negative.    Musculoskeletal: Negative.    Skin: Negative.    Allergic/Immunologic: Negative.    Neurological: Negative.    Hematological: Negative.    Psychiatric/Behavioral: Negative.         Personal History     Past Medical History:   Diagnosis Date   • A-fib ([facility])     dr. Giv[place] - [place]   • Anxiety    • Arthritis    • Breast cancer ([facility])    • COVID-19 virus infection 2021   • Delayed emergence from anesthesia     history   • Depression     situational  gets upset when in hospital    • Diabetes ([facility])     diet controlled  has weight loss   • Dysphagia    • Esophageal cancer ([facility])    • GERD (gastroesophageal reflux disease)    • History of mononucleosis    • History of transfusion     no reaction   • HX: breast cancer     right   • Hypertension    • Osteoporosis    • Thyroid disorder     hypothyroid   • Thyroid nodule        Past Surgical History:   Procedure Laterality Date   • BREAST BIOPSY Right    • BREAST BIOPSY Left    • BREAST LUMPECTOMY Right    • COLONOSCOPY     • CYSTOSCOPY URETEROSCOPY Left 2022    Procedure: CYSTOSCOPY, LEFT URETEROSCOPY, LASERTRIPSY, STENT INSERTION;  Surgeon: Jason Grajeda MD;  Location: [facility];  Service: Urology;  Laterality: Left;   • ENDOSCOPY  2019   • ENDOSCOPY  2018   • ENDOSCOPY N/A 2019     Procedure: ESOPHAGOGASTRODUODENOSCOPY with DILATATION (12-15mm balloon);  Surgeon: Aldair Booker MD;  Location: [facility] ENDOSCOPY;  Service: Gastroenterology   • ENDOSCOPY N/A 12/16/2019    Procedure: ESOPHAGOGASTRODUODENOSCOPY with balloon dilitation 15-18mm) up to 16mm;  Surgeon: Aldair Booker MD;  Location: [facility] ENDOSCOPY;  Service: Gastroenterology   • ENDOSCOPY N/A 07/24/2020    Procedure: ESOPHAGOGASTRODUODENOSCOPY with balloon dilation(12mm-15mm up to 14.5mm) of esophageal anastomosis stricture;  Surgeon: Aldair Booker MD;  Location: [facility] ENDOSCOPY;  Service: Gastroenterology;  Laterality: N/A;  esophageal stricture   • ENDOSCOPY N/A 05/13/2021    Procedure: ESOPHAGOGASTRODUODENOSCOPY WITH BALLOON DILATION UP TO 19MM;  Surgeon: Lily Bettencourt MD;  Location: [facility] ENDOSCOPY;  Service: Gastroenterology;  Laterality: N/A;  ANASTAMOTIC STRICTURE   • ENDOSCOPY N/A 06/24/2021    Procedure: ESOPHAGOGASTRODUODENOSCOPY WITH  DILATATION WITH BALLOON (18-20MM, UP TO 20MM);  Surgeon: Lily Bettencourt MD;  Location: [facility] ENDOSCOPY;  Service: Gastroenterology;  Laterality: N/A;  ESOPHAGOGASTRO ANASTOMOTIC STRICTURE   • ENDOSCOPY N/A 07/22/2021    Procedure: ESOPHAGOGASTRODUodenoscopy;  Surgeon: Lily Bettencourt MD;  Location: [facility] ENDOSCOPY;  Service: Gastroenterology;  Laterality: N/A;  esophageal pleural fistula   • ENDOSCOPY N/A 07/23/2021    Procedure: ESOPHAGOGASTRODUODENOSCOPY with stent placement;  Surgeon: Lily Bettencourt MD;  Location: [facility] MAIN OR;  Service: Gastroenterology;  Laterality: N/A;   • ENDOSCOPY N/A 12/01/2021    Procedure: ESOPHAGOGASTRODUODENOSCOPY WITH STENT REMOVAL;  Surgeon: Lily Bettencourt MD;  Location: [facility] MAIN OR;  Service: Gastroenterology;  Laterality: N/A;   • ENDOSCOPY W/ STENT PLACEMENT/REMOVAL N/A 10/06/2021    Procedure: ESOPHAGOGASTRODUODENOSCOPY WITH STENT removal and possible replacement;  Surgeon: Lily Bettencourt MD;  Location: [facility] MAIN OR;   Service: Gastroenterology;  Laterality: N/A;   • ESOPHAGOGASTRECTOMY  03/04/2019    Royce Sunil   • ESOPHAGUS SURGERY      had a stent placed for hole in esophagus   • JEJUNOSTOMY N/A 07/26/2021    Procedure: OPEN JEJUNOSTOMY TUBE;  Surgeon: Bulmaro Coreas Jr., MD;  Location: [facility] OR;  Service: General;  Laterality: N/A;   • LAPAROSCOPIC TUBAL LIGATION     • THORACOTOMY Right 08/17/2018    right vats biopsy of mediastinal mass, right thoracotomy       Family History: Her family history includes Bone cancer in her mother; Diabetes in her daughter and maternal grandfather; Hypertension in her daughter and mother; Ulcerative colitis in her father.     Social History: She  reports that she quit smoking about 22 years ago. Her smoking use included cigars and cigarettes. She has a 5.00 pack-year smoking history. She has never used smokeless tobacco. She reports previous alcohol use. She reports that she does not use drugs.    Home Medications:  Cyanocobalamin, apixaban, cetirizine, levothyroxine, and metoprolol tartrate    Allergies:  She is allergic to amoxicillin, contrast dye, and iodine.    Objective    Objective     Vitals:    Temp:  [98.1 °F (36.7 °C)] 98.1 °F (36.7 °C)  Heart Rate:  [66] 66  Resp:  [18] 18  BP: (212)/(84) 212/84    Physical Exam  Vitals and nursing note reviewed.   Constitutional:       Appearance: She is well-developed.   HENT:      Head: Normocephalic and atraumatic.      Nose: Nose normal.   Eyes:      Conjunctiva/sclera: Conjunctivae normal.   Neck:      Comments: 3 cm lesion in the midline of the neck, mobile  Cardiovascular:      Rate and Rhythm: Normal rate.   Pulmonary:      Effort: Pulmonary effort is normal.   Abdominal:      Palpations: Abdomen is soft.   Musculoskeletal:      Cervical back: Neck supple.   Skin:     General: Skin is warm and dry.   Neurological:      Mental Status: She is alert and oriented to person, place, and time.   Psychiatric:         Behavior: Behavior  normal.         Thought Content: Thought content normal.         Judgment: Judgment normal.         Assessment & Plan   Assessment / Plan     Brief Patient Summary:  Camilla Marcos is a 76 y.o. female who presents with a neck mass.    Pre-Op Diagnosis Codes:     * Neck mass [R22.1]    Active Hospital Problems:  Active Hospital Problems    Diagnosis    • **Neck mass      Added automatically from request for surgery [id number]       Plan:   Procedure(s):  Excision of a superficial neck mass    The risks, benefits, and alternatives of the procedure including but not limited to recurrence, injury to surrounding structures and risks of the anesthesia were discussed in detail with the patient and questions were answered. No guarantees were made or implied. Informed consent was obtained.    Lily Bettencourt MD

## 2022-06-28 NOTE — ANESTHESIA PREPROCEDURE EVALUATION
Anesthesia Evaluation     Patient summary reviewed and Nursing notes reviewed   no history of anesthetic complications:  NPO Solid Status: > 6 hours  NPO Liquid Status: > 6 hours           Airway   Mallampati: II  TM distance: >3 FB  Neck ROM: full  no difficulty expected and No difficulty expected  Dental - normal exam     Pulmonary - negative pulmonary ROS and normal exam    breath sounds clear to auscultation  (-) rhonchi, decreased breath sounds, wheezes, rales, stridor  Cardiovascular - normal exam    NYHA Classification: I  ECG reviewed  Patient on routine beta blocker and Beta blocker given within 24 hours of surgery  Rhythm: regular  Rate: normal    (+) hypertension, dysrhythmias Paroxysmal Atrial Fib, hyperlipidemia,   (-) murmur, weak pulses, friction rub, systolic click, carotid bruits, JVD, peripheral edema      Neuro/Psych- negative ROS  GI/Hepatic/Renal/Endo    (+)  GERD,  renal disease stones, diabetes mellitus type 2 poorly controlled, thyroid problem hypothyroidism    ROS Comment: Esophageal CA/   S/P partial esophagectomy 4 years ago  Dysphagia    Musculoskeletal     Abdominal  - normal exam    Abdomen: soft.   Substance History - negative use     OB/GYN negative ob/gyn ROS         Other   arthritis,    history of cancer                    Anesthesia Plan    ASA 3     general             CODE STATUS:

## 2022-06-28 NOTE — ANESTHESIA POSTPROCEDURE EVALUATION
Patient: Camilla Marcos    Procedure Summary     Date: 06/28/22 Room / Location: [facility] OR  / [facility] MAIN OR    Anesthesia Start: 0735 Anesthesia Stop: 0831    Procedure: Excision of a superficial neck mass (N/A ) Diagnosis:       Neck mass      (Neck mass [R22.1])    Surgeons: Lily Bettencourt MD Provider: Ej Palomino MD    Anesthesia Type: general ASA Status: 3          Anesthesia Type: general    Vitals  Vitals Value Taken Time   /69 06/28/22 0946   Temp 36.6 °C (97.8 °F) 06/28/22 0830   Pulse 66 06/28/22 0954   Resp 16 06/28/22 0945   SpO2 97 % 06/28/22 0954   Vitals shown include unvalidated device data.        Post Anesthesia Care and Evaluation    Patient location during evaluation: bedside  Patient participation: complete - patient participated  Level of consciousness: awake  Pain management: adequate    Airway patency: patent  Anesthetic complications: No anesthetic complications    Cardiovascular status: acceptable  Respiratory status: acceptable  Hydration status: acceptable    Comments: /64   Pulse 69   Temp 36.6 °C (97.8 °F) (Oral)   Resp 18   Wt 63.5 kg (139 lb 15.9 oz)   SpO2 100%   BMI 21.93 kg/m²

## 2022-06-29 LAB
LAB AP CASE REPORT: NORMAL
PATH REPORT.FINAL DX SPEC: NORMAL
PATH REPORT.GROSS SPEC: NORMAL

## 2022-07-01 LAB
BACTERIA SPEC AEROBE CULT: ABNORMAL
GRAM STN SPEC: ABNORMAL
GRAM STN SPEC: ABNORMAL

## 2022-07-03 LAB — BACTERIA SPEC ANAEROBE CULT: ABNORMAL

## 2022-07-19 ENCOUNTER — OFFICE VISIT (OUTPATIENT)
Dept: OTHER | Facility: HOSPITAL | Age: 77
End: 2022-07-19

## 2022-07-19 ENCOUNTER — LAB (OUTPATIENT)
Dept: LAB | Facility: HOSPITAL | Age: 77
End: 2022-07-19

## 2022-07-19 ENCOUNTER — TRANSCRIBE ORDERS (OUTPATIENT)
Dept: LAB | Facility: HOSPITAL | Age: 77
End: 2022-07-19

## 2022-07-19 VITALS
HEART RATE: 59 BPM | SYSTOLIC BLOOD PRESSURE: 142 MMHG | OXYGEN SATURATION: 94 % | BODY MASS INDEX: 21.82 KG/M2 | DIASTOLIC BLOOD PRESSURE: 72 MMHG | WEIGHT: 139 LBS | HEIGHT: 67 IN

## 2022-07-19 DIAGNOSIS — Z20.822 ENCOUNTER FOR LABORATORY TESTING FOR COVID-19 VIRUS: Primary | ICD-10-CM

## 2022-07-19 DIAGNOSIS — Z20.822 ENCOUNTER FOR LABORATORY TESTING FOR COVID-19 VIRUS: ICD-10-CM

## 2022-07-19 DIAGNOSIS — C49.A9 MALIGNANT GASTROINTESTINAL STROMAL TUMOR (GIST) OF OTHER SITE: Primary | ICD-10-CM

## 2022-07-19 LAB — SARS-COV-2 RNA PNL SPEC NAA+PROBE: NOT DETECTED

## 2022-07-19 PROCEDURE — U0004 COV-19 TEST NON-CDC HGH THRU: HCPCS

## 2022-07-19 PROCEDURE — 99213 OFFICE O/P EST LOW 20 MIN: CPT | Performed by: THORACIC SURGERY (CARDIOTHORACIC VASCULAR SURGERY)

## 2022-07-19 PROCEDURE — U0005 INFEC AGEN DETEC AMPLI PROBE: HCPCS

## 2022-07-19 NOTE — PROGRESS NOTES
"Chief Complaint  GIST, sebaceous cyst  Subjective        Camilla Marcos presents to [facility]  History of Present Illness  Ms. Ansley Marcos is a pleasant 76-year-old lady status post excision of a sebaceous cyst of the neck. She presents today in follow-up. She also has a history of esophagectomy for a gist tumor and esophagogastric anastomotic leak. Her recent CT scan was performed in 2022 and she is due for a another CT of the chest, abdomen, and pelvis in 10/2022.    The patient reports that she read the pathology reports, but did not understand what was documented. She was relieved to know it was only a sebaceous cyst; however, she inquired if the bacteria from the cyst could be causing her symptoms she is experiencing now that are making her feel \"bad.\"  The patient states that she has no energy and that her appetite is gone. She reports to waking up this morning at 4:00 AM as a result of going to bed really early the night before because she is so tired. The patient states she has taken a home COVID-19 test and states she did not have any luck with the test as it was due to  next week. The home test was either invalid or negative, the patient states she could not tell what it was reading.  She states that she received her fourth COVID-19 vaccine last week, which she reports the muscle aches were terrible after that recent injection. The patient reports that she was experiencing shortness of breath and today her oxygen levels were reading 94 percent. She states she cannot walk across the room without feeling like she has not strength.  She inquired if she should of received the COVID-19 vaccine and if this could attribute to her most recent symptoms. The patient states she is not currently coughing, but woke up this morning with a cough that she decided to take an allergy pill which she states helped a little.    Objective   Vital Signs:  There were no vitals " "taken for this visit.  Estimated body mass index is 21.93 kg/m² as calculated from the following:    Height as of 6/3/22: 170.2 cm (67\").    Weight as of 6/28/22: 63.5 kg (139 lb 15.9 oz).    BMI is within normal parameters. No other follow-up for BMI required.      Physical Exam  Vitals and nursing note reviewed.   Constitutional:       Appearance: She is well-developed.   HENT:      Head: Normocephalic and atraumatic.      Nose: Nose normal.   Eyes:      Conjunctiva/sclera: Conjunctivae normal.   Cardiovascular:      Rate and Rhythm: Normal rate.   Pulmonary:      Effort: Pulmonary effort is normal.   Abdominal:      Palpations: Abdomen is soft.   Musculoskeletal:      Cervical back: Neck supple.   Skin:     General: Skin is warm and dry.   Neurological:      Mental Status: She is alert and oriented to person, place, and time.   Psychiatric:         Behavior: Behavior normal.         Thought Content: Thought content normal.         Judgment: Judgment normal.        Result Review :               Pathology consistent with an epidermal inclusion cyst. Cultures consistent with skin charis.  Assessment and Plan   There are no diagnoses linked to this encounter.        Ms. 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  We will plan to see her back after her CT chest abdomen pelvis.    For her acute illness, I suspect she may have COVID-19 pneumonia.  She will need to follow-up with her primary care physician regarding her symptoms.    The lesion on her neck was consistent with a sebaceous cyst.  No further action needed.    I spent 20 minutes caring for Camilla on this date of service. This time includes time spent by me in the following activities:preparing for the visit, reviewing tests, obtaining and/or reviewing a " separately obtained history, performing a medically appropriate examination and/or evaluation , counseling and educating the patient/family/caregiver, ordering medications, tests, or procedures, documenting information in the medical record and independently interpreting results and communicating that information with the patient/family/caregiver  Follow Up   No follow-ups on file.  Patient was given instructions and counseling regarding her condition or for health maintenance advice. Please see specific information pulled into the AVS if appropriate.     Transcribed from ambient dictation for Lily Bettencourt MD by Stacey Alonzo.  07/19/22   21:07 EDT    Patient verbalized consent to the visit recording.  I have personally performed the services described in this document as transcribed by the above individual, and it is both accurate and complete.  Lily Bettencourt MD  7/29/2022  13:07 EDT

## 2022-07-25 LAB — FUNGUS WND CULT: NORMAL

## 2022-08-09 LAB
MYCOBACTERIUM SPEC CULT: NORMAL
NIGHT BLUE STAIN TISS: NORMAL

## 2022-08-20 ENCOUNTER — APPOINTMENT (OUTPATIENT)
Dept: CT IMAGING | Facility: HOSPITAL | Age: 77
End: 2022-08-20

## 2022-08-20 ENCOUNTER — HOSPITAL ENCOUNTER (INPATIENT)
Facility: HOSPITAL | Age: 77
LOS: 4 days | Discharge: HOME | End: 2022-08-24
Attending: EMERGENCY MEDICINE | Admitting: INTERNAL MEDICINE

## 2022-08-20 ENCOUNTER — APPOINTMENT (OUTPATIENT)
Dept: GENERAL RADIOLOGY | Facility: HOSPITAL | Age: 77
End: 2022-08-20

## 2022-08-20 DIAGNOSIS — R06.09 DYSPNEA ON MINIMAL EXERTION: ICD-10-CM

## 2022-08-20 DIAGNOSIS — I50.9 ACUTE ON CHRONIC CONGESTIVE HEART FAILURE, UNSPECIFIED HEART FAILURE TYPE: ICD-10-CM

## 2022-08-20 DIAGNOSIS — D64.9 ACUTE ANEMIA: ICD-10-CM

## 2022-08-20 DIAGNOSIS — R10.13 ABDOMINAL PAIN, ACUTE, EPIGASTRIC: ICD-10-CM

## 2022-08-20 DIAGNOSIS — R26.2 DIFFICULTY WALKING: ICD-10-CM

## 2022-08-20 DIAGNOSIS — Z78.9 DECREASED ACTIVITIES OF DAILY LIVING (ADL): ICD-10-CM

## 2022-08-20 DIAGNOSIS — I48.91 ATRIAL FIBRILLATION WITH RVR: Primary | ICD-10-CM

## 2022-08-20 LAB
ALBUMIN SERPL-MCNC: 4 G/DL (ref 3.5–5.2)
ALBUMIN/GLOB SERPL: 1.4 G/DL
ALP SERPL-CCNC: 87 U/L (ref 39–117)
ALT SERPL W P-5'-P-CCNC: 20 U/L (ref 1–33)
ANION GAP SERPL CALCULATED.3IONS-SCNC: 12.9 MMOL/L (ref 5–15)
AST SERPL-CCNC: 17 U/L (ref 1–32)
BASOPHILS # BLD AUTO: 0.08 10*3/MM3 (ref 0–0.2)
BASOPHILS NFR BLD AUTO: 0.8 % (ref 0–1.5)
BILIRUB SERPL-MCNC: 0.8 MG/DL (ref 0–1.2)
BUN SERPL-MCNC: 21 MG/DL (ref 8–23)
BUN/CREAT SERPL: 25.9 (ref 7–25)
CALCIUM SPEC-SCNC: 9.1 MG/DL (ref 8.6–10.5)
CHLORIDE SERPL-SCNC: 101 MMOL/L (ref 98–107)
CO2 SERPL-SCNC: 23.1 MMOL/L (ref 22–29)
CREAT SERPL-MCNC: 0.81 MG/DL (ref 0.57–1)
D-LACTATE SERPL-SCNC: 1.8 MMOL/L (ref 0.5–2)
DEPRECATED RDW RBC AUTO: 43.7 FL (ref 37–54)
EGFRCR SERPLBLD CKD-EPI 2021: 74.9 ML/MIN/1.73
EOSINOPHIL # BLD AUTO: 0.17 10*3/MM3 (ref 0–0.4)
EOSINOPHIL NFR BLD AUTO: 1.8 % (ref 0.3–6.2)
ERYTHROCYTE [DISTWIDTH] IN BLOOD BY AUTOMATED COUNT: 15 % (ref 12.3–15.4)
GLOBULIN UR ELPH-MCNC: 2.9 GM/DL
GLUCOSE BLDC GLUCOMTR-MCNC: 115 MG/DL (ref 70–99)
GLUCOSE BLDC GLUCOMTR-MCNC: 289 MG/DL (ref 70–99)
GLUCOSE SERPL-MCNC: 207 MG/DL (ref 65–99)
HCT VFR BLD AUTO: 33.8 % (ref 34–46.6)
HEMOCCULT STL QL IA: NEGATIVE
HGB BLD-MCNC: 10.3 G/DL (ref 12–15.9)
HOLD SPECIMEN: NORMAL
HOLD SPECIMEN: NORMAL
IMM GRANULOCYTES # BLD AUTO: 0.03 10*3/MM3 (ref 0–0.05)
IMM GRANULOCYTES NFR BLD AUTO: 0.3 % (ref 0–0.5)
LIPASE SERPL-CCNC: 22 U/L (ref 13–60)
LYMPHOCYTES # BLD AUTO: 2.2 10*3/MM3 (ref 0.7–3.1)
LYMPHOCYTES NFR BLD AUTO: 23.1 % (ref 19.6–45.3)
MCH RBC QN AUTO: 24.3 PG (ref 26.6–33)
MCHC RBC AUTO-ENTMCNC: 30.5 G/DL (ref 31.5–35.7)
MCV RBC AUTO: 79.7 FL (ref 79–97)
MONOCYTES # BLD AUTO: 1.18 10*3/MM3 (ref 0.1–0.9)
MONOCYTES NFR BLD AUTO: 12.4 % (ref 5–12)
NEUTROPHILS NFR BLD AUTO: 5.87 10*3/MM3 (ref 1.7–7)
NEUTROPHILS NFR BLD AUTO: 61.6 % (ref 42.7–76)
NRBC BLD AUTO-RTO: 0 /100 WBC (ref 0–0.2)
NT-PROBNP SERPL-MCNC: 5053 PG/ML (ref 0–1800)
PLATELET # BLD AUTO: 382 10*3/MM3 (ref 140–450)
PMV BLD AUTO: 11.2 FL (ref 6–12)
POTASSIUM SERPL-SCNC: 4.1 MMOL/L (ref 3.5–5.2)
PROT SERPL-MCNC: 6.9 G/DL (ref 6–8.5)
QT INTERVAL: 322 MS
RBC # BLD AUTO: 4.24 10*6/MM3 (ref 3.77–5.28)
SODIUM SERPL-SCNC: 137 MMOL/L (ref 136–145)
TROPONIN T SERPL-MCNC: <0.01 NG/ML (ref 0–0.03)
WBC NRBC COR # BLD: 9.53 10*3/MM3 (ref 3.4–10.8)
WHOLE BLOOD HOLD COAG: NORMAL
WHOLE BLOOD HOLD SPECIMEN: NORMAL

## 2022-08-20 PROCEDURE — 74176 CT ABD & PELVIS W/O CONTRAST: CPT

## 2022-08-20 PROCEDURE — 25010000002 FUROSEMIDE PER 20 MG: Performed by: INTERNAL MEDICINE

## 2022-08-20 PROCEDURE — 82274 ASSAY TEST FOR BLOOD FECAL: CPT | Performed by: EMERGENCY MEDICINE

## 2022-08-20 PROCEDURE — 99284 EMERGENCY DEPT VISIT MOD MDM: CPT

## 2022-08-20 PROCEDURE — 83880 ASSAY OF NATRIURETIC PEPTIDE: CPT | Performed by: EMERGENCY MEDICINE

## 2022-08-20 PROCEDURE — 84484 ASSAY OF TROPONIN QUANT: CPT | Performed by: EMERGENCY MEDICINE

## 2022-08-20 PROCEDURE — 80053 COMPREHEN METABOLIC PANEL: CPT | Performed by: EMERGENCY MEDICINE

## 2022-08-20 PROCEDURE — 83690 ASSAY OF LIPASE: CPT | Performed by: EMERGENCY MEDICINE

## 2022-08-20 PROCEDURE — 93005 ELECTROCARDIOGRAM TRACING: CPT | Performed by: EMERGENCY MEDICINE

## 2022-08-20 PROCEDURE — 63710000001 INSULIN LISPRO (HUMAN) PER 5 UNITS: Performed by: INTERNAL MEDICINE

## 2022-08-20 PROCEDURE — 71045 X-RAY EXAM CHEST 1 VIEW: CPT

## 2022-08-20 PROCEDURE — 93010 ELECTROCARDIOGRAM REPORT: CPT | Performed by: INTERNAL MEDICINE

## 2022-08-20 PROCEDURE — 83605 ASSAY OF LACTIC ACID: CPT | Performed by: EMERGENCY MEDICINE

## 2022-08-20 PROCEDURE — 85025 COMPLETE CBC W/AUTO DIFF WBC: CPT | Performed by: EMERGENCY MEDICINE

## 2022-08-20 PROCEDURE — 25010000002 FUROSEMIDE PER 20 MG: Performed by: EMERGENCY MEDICINE

## 2022-08-20 PROCEDURE — 94799 UNLISTED PULMONARY SVC/PX: CPT

## 2022-08-20 PROCEDURE — 82962 GLUCOSE BLOOD TEST: CPT

## 2022-08-20 RX ORDER — ALBUTEROL SULFATE 2.5 MG/3ML
2.5 SOLUTION RESPIRATORY (INHALATION) EVERY 4 HOURS PRN
Status: DISCONTINUED | OUTPATIENT
Start: 2022-08-20 | End: 2022-08-24 | Stop reason: HOSPADM

## 2022-08-20 RX ORDER — METOPROLOL SUCCINATE 100 MG/1
100 TABLET, EXTENDED RELEASE ORAL 2 TIMES DAILY
COMMUNITY

## 2022-08-20 RX ORDER — ACETAMINOPHEN 325 MG/1
650 TABLET ORAL EVERY 4 HOURS PRN
Status: DISCONTINUED | OUTPATIENT
Start: 2022-08-20 | End: 2022-08-24 | Stop reason: HOSPADM

## 2022-08-20 RX ORDER — METOPROLOL SUCCINATE 50 MG/1
100 TABLET, EXTENDED RELEASE ORAL 2 TIMES DAILY
Status: DISCONTINUED | OUTPATIENT
Start: 2022-08-20 | End: 2022-08-24 | Stop reason: HOSPADM

## 2022-08-20 RX ORDER — ONDANSETRON 2 MG/ML
4 INJECTION INTRAMUSCULAR; INTRAVENOUS EVERY 6 HOURS PRN
Status: DISCONTINUED | OUTPATIENT
Start: 2022-08-20 | End: 2022-08-24 | Stop reason: HOSPADM

## 2022-08-20 RX ORDER — ALBUTEROL SULFATE 90 UG/1
2 AEROSOL, METERED RESPIRATORY (INHALATION) EVERY 4 HOURS PRN
COMMUNITY

## 2022-08-20 RX ORDER — BUDESONIDE AND FORMOTEROL FUMARATE DIHYDRATE 160; 4.5 UG/1; UG/1
2 AEROSOL RESPIRATORY (INHALATION)
Status: DISPENSED | OUTPATIENT
Start: 2022-08-20 | End: 2022-08-24

## 2022-08-20 RX ORDER — SODIUM CHLORIDE 0.9 % (FLUSH) 0.9 %
10 SYRINGE (ML) INJECTION AS NEEDED
Status: DISCONTINUED | OUTPATIENT
Start: 2022-08-20 | End: 2022-08-24 | Stop reason: HOSPADM

## 2022-08-20 RX ORDER — ALUMINA, MAGNESIA, AND SIMETHICONE 2400; 2400; 240 MG/30ML; MG/30ML; MG/30ML
15 SUSPENSION ORAL EVERY 6 HOURS PRN
Status: DISCONTINUED | OUTPATIENT
Start: 2022-08-20 | End: 2022-08-24 | Stop reason: HOSPADM

## 2022-08-20 RX ORDER — CETIRIZINE HYDROCHLORIDE 10 MG/1
10 TABLET ORAL DAILY
COMMUNITY

## 2022-08-20 RX ORDER — FUROSEMIDE 10 MG/ML
40 INJECTION INTRAMUSCULAR; INTRAVENOUS
Status: DISCONTINUED | OUTPATIENT
Start: 2022-08-20 | End: 2022-08-24 | Stop reason: HOSPADM

## 2022-08-20 RX ORDER — INSULIN LISPRO 100 [IU]/ML
0-7 INJECTION, SOLUTION INTRAVENOUS; SUBCUTANEOUS
Status: DISCONTINUED | OUTPATIENT
Start: 2022-08-20 | End: 2022-08-24 | Stop reason: HOSPADM

## 2022-08-20 RX ORDER — TIOTROPIUM BROMIDE INHALATION SPRAY 3.12 UG/1
2 SPRAY, METERED RESPIRATORY (INHALATION)
COMMUNITY
End: 2022-08-24 | Stop reason: HOSPADM

## 2022-08-20 RX ORDER — FUROSEMIDE 10 MG/ML
40 INJECTION INTRAMUSCULAR; INTRAVENOUS ONCE
Status: COMPLETED | OUTPATIENT
Start: 2022-08-20 | End: 2022-08-20

## 2022-08-20 RX ORDER — CETIRIZINE HYDROCHLORIDE 10 MG/1
10 TABLET ORAL DAILY
Status: DISCONTINUED | OUTPATIENT
Start: 2022-08-20 | End: 2022-08-20

## 2022-08-20 RX ORDER — DILTIAZEM HCL IN NACL,ISO-OSM 125 MG/125
5-15 PLASTIC BAG, INJECTION (ML) INTRAVENOUS
Status: DISCONTINUED | OUTPATIENT
Start: 2022-08-20 | End: 2022-08-21

## 2022-08-20 RX ORDER — NICOTINE POLACRILEX 4 MG
15 LOZENGE BUCCAL
Status: DISCONTINUED | OUTPATIENT
Start: 2022-08-20 | End: 2022-08-24 | Stop reason: HOSPADM

## 2022-08-20 RX ORDER — NITROGLYCERIN 0.4 MG/1
0.4 TABLET SUBLINGUAL
Status: DISCONTINUED | OUTPATIENT
Start: 2022-08-20 | End: 2022-08-24 | Stop reason: HOSPADM

## 2022-08-20 RX ORDER — BUDESONIDE AND FORMOTEROL FUMARATE DIHYDRATE 160; 4.5 UG/1; UG/1
2 AEROSOL RESPIRATORY (INHALATION) 2 TIMES DAILY PRN
COMMUNITY

## 2022-08-20 RX ORDER — DEXTROSE MONOHYDRATE 25 G/50ML
25 INJECTION, SOLUTION INTRAVENOUS
Status: DISCONTINUED | OUTPATIENT
Start: 2022-08-20 | End: 2022-08-24 | Stop reason: HOSPADM

## 2022-08-20 RX ORDER — CETIRIZINE HYDROCHLORIDE 10 MG/1
10 TABLET ORAL DAILY
Status: DISCONTINUED | OUTPATIENT
Start: 2022-08-21 | End: 2022-08-24 | Stop reason: HOSPADM

## 2022-08-20 RX ORDER — FUROSEMIDE 10 MG/ML
20 INJECTION INTRAMUSCULAR; INTRAVENOUS ONCE
Status: DISCONTINUED | OUTPATIENT
Start: 2022-08-20 | End: 2022-08-20

## 2022-08-20 RX ORDER — LEVOTHYROXINE SODIUM 0.03 MG/1
25 TABLET ORAL
Status: DISCONTINUED | OUTPATIENT
Start: 2022-08-21 | End: 2022-08-24 | Stop reason: HOSPADM

## 2022-08-20 RX ADMIN — FUROSEMIDE 40 MG: 10 INJECTION, SOLUTION INTRAMUSCULAR; INTRAVENOUS at 17:15

## 2022-08-20 RX ADMIN — METOPROLOL SUCCINATE 100 MG: 100 TABLET, EXTENDED RELEASE ORAL at 20:25

## 2022-08-20 RX ADMIN — DILTIAZEM HYDROCHLORIDE 30 MG: 30 TABLET, FILM COATED ORAL at 20:25

## 2022-08-20 RX ADMIN — FUROSEMIDE 40 MG: 10 INJECTION, SOLUTION INTRAMUSCULAR; INTRAVENOUS at 10:18

## 2022-08-20 RX ADMIN — Medication 5 MG/HR: at 11:50

## 2022-08-20 RX ADMIN — DILTIAZEM HYDROCHLORIDE 30 MG: 30 TABLET, FILM COATED ORAL at 16:23

## 2022-08-20 RX ADMIN — APIXABAN 5 MG: 5 TABLET, FILM COATED ORAL at 20:25

## 2022-08-20 RX ADMIN — INSULIN LISPRO 4 UNITS: 100 INJECTION, SOLUTION INTRAVENOUS; SUBCUTANEOUS at 17:15

## 2022-08-20 RX ADMIN — METOPROLOL TARTRATE 5 MG: 1 INJECTION, SOLUTION INTRAVENOUS at 10:19

## 2022-08-20 NOTE — CONSULTS
Date of service: 8/20/2022    Reason for consultation: A. fib with RVR    HPI: A 77-year-old female with a history of chronic atrial fibrillation presented with exertional dyspnea with minimal activities, palpitations, fatigue and and later on nonirradiated left-sided chest pain-discom a week ago fort.  She was seen i and she had n the office approximately 1 week ago and she had an event recorder for 1 week.  The recorder demonstrated atrial fibrillation with RVR.  We could not reach her by phone to adjust her cardiac oral medications.  We left a message to call us this coming Monday.  However, she came to the ER for further evaluation.    Review of systems: Fatigue some abdominal discomfort.  Negative for headaches, tinnitus, vertigo, fever, chills, nausea, vomiting, GI or  bleed.  No TIA or CVA-like symptoms    Past medical surgical history    1.  Persistent atrial fibrillation with a chronic diastolic CHF.  2.  Hypertension  3.  DM type II  4.  Anxiety and depression  5.  COVID-19 infection  6.  Arthritis  7.  Breast cancer  8.  Surgeries-procedures: EGD with esophageal dilatation, breast biopsy, thoracotomy, colonoscopy and cystoscopy.    Medications: See the patient's medications list    Allergies: Amoxicillin, iodine and contrast dye.    Social history: Non-smoker nondrinker.  No illicit drug use.    Family history: Noncontributory.    Physical examinations: She was in no pain or respiratory distress.  Vital signs: Reviewed  HEENT: Sclera nonicteric EOMI  Neck: Extended external jugular vein.  No carotid bruit.  Carotid upstroke is normal  Chest: Few rales bibasilarly.  No wheezes.  Cardiac: Irregularly irregular.  No S3 gallop.  Extremities: Trace to 1/4 bilateral ankle edema.  No cyanosis or clubbing.  Pulses intact.  Neuro: Alert, oriented x3 no facial droop and speech was clear    Records: Reviewed    Assessment and plan:    1.  Persistent atrial fibrillation with RVR  2.  Acute on chronic diastolic  CHF  3.  Chest pain, mostly due to #1  4.  Anxiety-depression  5.  Hypertension  6.  Echocardiography  7.  Continue IV Cardizem drip and titrate.  8.  Add Cardizem 30 mg p.o. twice daily and adjust the dose when needed.  Continue metoprolol succinate 100 mg p.o. twice daily.  9.  Manual blood pressure twice a shift.  10.  Any further management will be based on her clinical course and test results

## 2022-08-20 NOTE — H&P
[facility]   HISTORY AND PHYSICAL    Patient Name: Camilla Marcos  : 1945  MRN: [id number]  Primary Care Physician:  Homero Ro MD  Date of admission: 2022    Subjective   Subjective     Chief Complaint: Patient known to me with multiple health issues including breast cancer hypothyroidism atrial fibrillation congestive heart failure as well as history of gastrointestinal stromal tumor status postchemotherapy he is complaining of extreme shortness of air with irregular heartbeat she was seen in the office about 2 to 3 days ago was sent to Dr. Ed Sparks for further evaluation had a monitor put on for checking her heartbeat patient became more short of air and was brought to the emergency room where his CT scan shows abdominal fluid as well as chest x-ray finding consistent with worsening congestive heart failure he is therefore being admitted for further management    HPI: Patient with congestive heart failure and atrial fibrillation many health issues including that of gastrointestinal stromal tumor COPD CAD atrial fibrillation CHF history of breast cancer and diabetes mellitus    Camilla Marcos is a 77 y.o. female patient with shortness of air with CHF and atrial fibrillation    Review of Systems   All systems were reviewed and negative except for: Reviewed    Personal History     Past Medical History:   Diagnosis Date   • A-fib (HCC)     dr. Givens - [place]   • Anxiety    • Arthritis    • Breast cancer (HCC)    • COVID-19 virus infection 2021   • Delayed emergence from anesthesia     history   • Depression     situational  gets upset when in hospital    • Diabetes (HCC)     diet controlled  has weight loss   • Dysphagia    • Esophageal cancer (HCC)    • GERD (gastroesophageal reflux disease)    • History of mononucleosis    • History of transfusion     no reaction   • HX: breast cancer 2001    right   • Hypertension    • Osteoporosis    • Thyroid disorder     hypothyroid   •  Thyroid nodule        Past Surgical History:   Procedure Laterality Date   • BREAST BIOPSY Right 2001   • BREAST BIOPSY Left 1990   • BREAST LUMPECTOMY Right    • COLONOSCOPY     • CYSTOSCOPY URETEROSCOPY Left 4/27/2022    Procedure: CYSTOSCOPY, LEFT URETEROSCOPY, LASERTRIPSY, STENT INSERTION;  Surgeon: Jason Grajeda MD;  Location: [facility];  Service: Urology;  Laterality: Left;   • ENDOSCOPY  02/06/2019   • ENDOSCOPY  08/06/2018   • ENDOSCOPY N/A 09/16/2019    Procedure: ESOPHAGOGASTRODUODENOSCOPY with DILATATION (12-15mm balloon);  Surgeon: Aldair Booker MD;  Location: [facility] ENDOSCOPY;  Service: Gastroenterology   • ENDOSCOPY N/A 12/16/2019    Procedure: ESOPHAGOGASTRODUODENOSCOPY with balloon dilitation 15-18mm) up to 16mm;  Surgeon: Aldair Booker MD;  Location: [facility] ENDOSCOPY;  Service: Gastroenterology   • ENDOSCOPY N/A 07/24/2020    Procedure: ESOPHAGOGASTRODUODENOSCOPY with balloon dilation(12mm-15mm up to 14.5mm) of esophageal anastomosis stricture;  Surgeon: Aldair Booker MD;  Location: [facility] ENDOSCOPY;  Service: Gastroenterology;  Laterality: N/A;  esophageal stricture   • ENDOSCOPY N/A 05/13/2021    Procedure: ESOPHAGOGASTRODUODENOSCOPY WITH BALLOON DILATION UP TO 19MM;  Surgeon: Lily Bettencourt MD;  Location: [facility] ENDOSCOPY;  Service: Gastroenterology;  Laterality: N/A;  ANASTAMOTIC STRICTURE   • ENDOSCOPY N/A 06/24/2021    Procedure: ESOPHAGOGASTRODUODENOSCOPY WITH  DILATATION WITH BALLOON (18-20MM, UP TO 20MM);  Surgeon: Lily Bettencourt MD;  Location: [facility] ENDOSCOPY;  Service: Gastroenterology;  Laterality: N/A;  ESOPHAGOGASTRO ANASTOMOTIC STRICTURE   • ENDOSCOPY N/A 07/22/2021    Procedure: ESOPHAGOGASTRODUodenoscopy;  Surgeon: Lily Bettencourt MD;  Location: [facility] ENDOSCOPY;  Service: Gastroenterology;  Laterality: N/A;  esophageal pleural fistula   • ENDOSCOPY N/A 07/23/2021    Procedure: ESOPHAGOGASTRODUODENOSCOPY with stent placement;  Surgeon: Lily Bettencourt  MD;  Location: [facility] OR;  Service: Gastroenterology;  Laterality: N/A;   • ENDOSCOPY N/A 12/01/2021    Procedure: ESOPHAGOGASTRODUODENOSCOPY WITH STENT REMOVAL;  Surgeon: Lily Bettencourt MD;  Location: [facility] OR;  Service: Gastroenterology;  Laterality: N/A;   • ENDOSCOPY W/ STENT PLACEMENT/REMOVAL N/A 10/06/2021    Procedure: ESOPHAGOGASTRODUODENOSCOPY WITH STENT removal and possible replacement;  Surgeon: Lily Bettencourt MD;  Location: [facility] OR;  Service: Gastroenterology;  Laterality: N/A;   • ESOPHAGOGASTRECTOMY  03/04/2019    Royce Sunil   • ESOPHAGUS SURGERY      had a stent placed for hole in esophagus   • HEAD/NECK ABSCESS INCISION AND DRAINAGE N/A 6/28/2022    Procedure: Excision of a superficial neck mass;  Surgeon: Lily Bettencourt MD;  Location: [facility];  Service: Cardiothoracic;  Laterality: N/A;   • JEJUNOSTOMY N/A 07/26/2021    Procedure: OPEN JEJUNOSTOMY TUBE;  Surgeon: Bulmaro Coreas Jr., MD;  Location: [facility];  Service: General;  Laterality: N/A;   • LAPAROSCOPIC TUBAL LIGATION     • THORACOTOMY Right 08/17/2018    right vats biopsy of mediastinal mass, right thoracotomy       Family History: family history includes Bone cancer in her mother; Diabetes in her daughter and maternal grandfather; Hypertension in her daughter and mother; Ulcerative colitis in her father. Otherwise pertinent FHx was reviewed and not pertinent to current issue.    Social History:  reports that she quit smoking about 22 years ago. Her smoking use included cigars and cigarettes. She has a 5.00 pack-year smoking history. She has never used smokeless tobacco. She reports previous alcohol use. She reports that she does not use drugs.    Home Medications:  Cyanocobalamin, albuterol sulfate HFA, apixaban, budesonide-formoterol, cetirizine, levothyroxine, metoprolol succinate XL, and tiotropium bromide monohydrate      Allergies:  Allergies   Allergen Reactions   • Amoxicillin Rash     Tolerated  ancef 2gm 07/23/2021   • Contrast Dye Rash   • Iodine Rash       Objective   Objective     Vitals:   Temp:  [97.4 °F (36.3 °C)] 97.4 °F (36.3 °C)  Heart Rate:  [] 108  Resp:  [18] 18  BP: (119-146)/(74-96) 146/74  Physical Exam    Constitutional: Awake, alert   Eyes: PERRLA, sclerae anicteric, no conjunctival injection   HENT: NCAT, mucous membranes moist   Neck: Supple, no thyromegaly, no lymphadenopathy, trachea midline   Respiratory: Clear to auscultation bilaterally, nonlabored respirations    Cardiovascular: RRR, no murmurs, rubs, or gallops, palpable pedal pulses bilaterally   Gastrointestinal: Positive bowel sounds, soft, nontender, nondistended   Musculoskeletal: No bilateral ankle edema, no clubbing or cyanosis to extremities   Psychiatric: Appropriate affect, cooperative   Neurologic: Oriented x 3, strength symmetric in all extremities, Cranial Nerves grossly intact to confrontation, speech clear   Skin: No rashes   Bilateral rhonchi edema feet has improved  Result Review    Result Review:  I have personally reviewed the results from the time of this admission to 8/20/2022 12:50 EDT and agree with these findings:  []  Laboratory  []  Microbiology  []  Radiology  []  EKG/Telemetry   [x]  Cardiology/Vascular atrial fibrillation with rapid ventricular rate  []  Pathology  []  Old records  []  Other:  Most notable findings include: Atrial fibrillation    Assessment & Plan   Assessment / Plan     Brief Patient Summary:  Camilla Marcos is a 77 y.o. female who patient with atrial fibrillation rapid ventricular rate    Active Hospital Problems:  Active Hospital Problems    Diagnosis    • Atrial fibrillation with RVR (HCC)        Plan:   The diuretics cardiology consultation admit to the hospital    DVT prophylaxis:  No DVT prophylaxis order currently exists.    CODE STATUS:         Admission Status:  I believe this patient meets inpatient status.    Electronically signed by Homero Ro MD, 08/20/22,  12:50 PM EDT.

## 2022-08-20 NOTE — ED PROVIDER NOTES
"Time: 7:37 AM EDT  Arrived by: private car  Chief Complaint: SOB, shoulder pain  History provided by: Patient  History is limited by: N/A     History of Present Illness:  Patient is a 77 y.o. year old female who presents to the emergency department with SOB, chest pain, upper abdominal pain. Patient states her SOB is with minimal exertion. Patient states her chest pain is intermittent. Patient has a history of diabetes, esophogeal cancer, breast cancer, and A-fib. Patient denies vomiting and reports that she has not been eating, denies diarrhea, denies melena, denies fever, Covid-19 . Patient feels urgency to urinate, has nausea, has a dry cough, has decreased urine because she feels dehydrated. Patient has her gallbladder. Patient states that she has\"no energy, no strength.\"     HPI  Similar Symptoms Previously: No  Recently seen: Yes, 7/19/22      Patient Care Team  Primary Care Provider: Homero Ro MD    Past Medical History:     Allergies   Allergen Reactions   • Amoxicillin Rash     Tolerated ancef 2gm 07/23/2021   • Contrast Dye Rash   • Iodine Rash     Past Medical History:   Diagnosis Date   • A-fib ([facility])     dr. Chon Pope   • Anxiety    • Arthritis    • Breast cancer ([facility])    • COVID-19 virus infection 09/2021   • Delayed emergence from anesthesia     history   • Depression     situational  gets upset when in hospital    • Diabetes ([facility])     diet controlled  has weight loss   • Dysphagia    • Esophageal cancer ([facility]) 2019   • GERD (gastroesophageal reflux disease)    • History of mononucleosis    • History of transfusion     no reaction   • HX: breast cancer 2001    right   • Hypertension    • Osteoporosis    • Thyroid disorder     hypothyroid   • Thyroid nodule      Past Surgical History:   Procedure Laterality Date   • BREAST BIOPSY Right 2001   • BREAST BIOPSY Left 1990   • BREAST LUMPECTOMY Right    • COLONOSCOPY     • CYSTOSCOPY URETEROSCOPY Left 4/27/2022    Procedure: CYSTOSCOPY, LEFT " URETEROSCOPY, LASERTRIPSY, STENT INSERTION;  Surgeon: Jason Grajeda MD;  Location: [facility] MAIN OR;  Service: Urology;  Laterality: Left;   • ENDOSCOPY  02/06/2019   • ENDOSCOPY  08/06/2018   • ENDOSCOPY N/A 09/16/2019    Procedure: ESOPHAGOGASTRODUODENOSCOPY with DILATATION (12-15mm balloon);  Surgeon: Aldair Booker MD;  Location: [facility] ENDOSCOPY;  Service: Gastroenterology   • ENDOSCOPY N/A 12/16/2019    Procedure: ESOPHAGOGASTRODUODENOSCOPY with balloon dilitation 15-18mm) up to 16mm;  Surgeon: Aldair Booker MD;  Location: [facility] ENDOSCOPY;  Service: Gastroenterology   • ENDOSCOPY N/A 07/24/2020    Procedure: ESOPHAGOGASTRODUODENOSCOPY with balloon dilation(12mm-15mm up to 14.5mm) of esophageal anastomosis stricture;  Surgeon: Aldair Booker MD;  Location: [facility] ENDOSCOPY;  Service: Gastroenterology;  Laterality: N/A;  esophageal stricture   • ENDOSCOPY N/A 05/13/2021    Procedure: ESOPHAGOGASTRODUODENOSCOPY WITH BALLOON DILATION UP TO 19MM;  Surgeon: Lily Bettencourt MD;  Location: [facility] ENDOSCOPY;  Service: Gastroenterology;  Laterality: N/A;  ANASTAMOTIC STRICTURE   • ENDOSCOPY N/A 06/24/2021    Procedure: ESOPHAGOGASTRODUODENOSCOPY WITH  DILATATION WITH BALLOON (18-20MM, UP TO 20MM);  Surgeon: Lily Bettencourt MD;  Location: [facility] ENDOSCOPY;  Service: Gastroenterology;  Laterality: N/A;  ESOPHAGOGASTRO ANASTOMOTIC STRICTURE   • ENDOSCOPY N/A 07/22/2021    Procedure: ESOPHAGOGASTRODUodenoscopy;  Surgeon: Lily Bettencourt MD;  Location: [facility] ENDOSCOPY;  Service: Gastroenterology;  Laterality: N/A;  esophageal pleural fistula   • ENDOSCOPY N/A 07/23/2021    Procedure: ESOPHAGOGASTRODUODENOSCOPY with stent placement;  Surgeon: Lily Bettencourt MD;  Location: [facility] MAIN OR;  Service: Gastroenterology;  Laterality: N/A;   • ENDOSCOPY N/A 12/01/2021    Procedure: ESOPHAGOGASTRODUODENOSCOPY WITH STENT REMOVAL;  Surgeon: Lily Bettencourt MD;  Location: [facility] MAIN OR;  Service:  Gastroenterology;  Laterality: N/A;   • ENDOSCOPY W/ STENT PLACEMENT/REMOVAL N/A 10/06/2021    Procedure: ESOPHAGOGASTRODUODENOSCOPY WITH STENT removal and possible replacement;  Surgeon: Lily Bettencourt MD;  Location: [facility] MAIN OR;  Service: Gastroenterology;  Laterality: N/A;   • ESOPHAGOGASTRECTOMY  03/04/2019    [place] Sunil   • ESOPHAGUS SURGERY      had a stent placed for hole in esophagus   • HEAD/NECK ABSCESS INCISION AND DRAINAGE N/A 6/28/2022    Procedure: Excision of a superficial neck mass;  Surgeon: Lily Bettencourt MD;  Location: [facility] MAIN OR;  Service: Cardiothoracic;  Laterality: N/A;   • JEJUNOSTOMY N/A 07/26/2021    Procedure: OPEN JEJUNOSTOMY TUBE;  Surgeon: Bulmaro Coreas Jr., MD;  Location: [facility] MAIN OR;  Service: General;  Laterality: N/A;   • LAPAROSCOPIC TUBAL LIGATION     • THORACOTOMY Right 08/17/2018    right vats biopsy of mediastinal mass, right thoracotomy     Family History   Problem Relation Age of Onset   • Bone cancer Mother    • Hypertension Mother    • Ulcerative colitis Father    • Diabetes Maternal Grandfather    • Hypertension Daughter    • Diabetes Daughter    • Malig Hyperthermia Neg Hx        Home Medications:  Prior to Admission medications    Medication Sig Start Date End Date Taking? Authorizing Provider   Cyanocobalamin (VITAMIN B-12 IJ) Inject 1 ampule under the skin into the appropriate area as directed Every 30 (Thirty) Days. 5/16/19   Lawanda Osuna MD   Eliquis 5 MG tablet tablet Take 1 tablet by mouth Every 12 (Twelve) Hours. Resume 6/30/2022 6/28/22   Lily Bettencourt MD   levothyroxine (SYNTHROID, LEVOTHROID) 25 MCG tablet Take 25 mcg by mouth Daily. 11/20/19   Lawanda Osuna MD   metoprolol tartrate (LOPRESSOR) 50 MG tablet Administer 1 tablet per J tube Every 12 (Twelve) Hours.  Patient taking differently: Take 50 mg by mouth Every 12 (Twelve) Hours. 8/2/21   Ezequiel Jaime APRN   oxyCODONE (ROXICODONE) 5 MG immediate release tablet Take  "1-2 tablets by mouth Every 4 (Four) Hours As Needed for Moderate Pain . 22   Lily Bettencourt MD        Social History:   Social History     Tobacco Use   • Smoking status: Former Smoker     Packs/day: 0.25     Years: 20.00     Pack years: 5.00     Types: Cigars, Cigarettes     Quit date:      Years since quittin.6   • Smokeless tobacco: Never Used   Vaping Use   • Vaping Use: Never used   Substance Use Topics   • Alcohol use: Not Currently   • Drug use: Never       Review of Systems:  Review of Systems   Constitutional: Negative for chills and fever.   HENT: Negative for congestion, ear pain and sore throat.    Eyes: Negative for pain.   Respiratory: Positive for cough (dry cough) and shortness of breath (SOB with minimal exertion). Negative for chest tightness.    Cardiovascular: Positive for chest pain.   Gastrointestinal: Positive for abdominal pain (across entire upper abdomen) and nausea. Negative for blood in stool, diarrhea and vomiting.   Genitourinary: Positive for decreased urine volume and urgency. Negative for flank pain and hematuria.   Musculoskeletal: Negative for joint swelling.   Skin: Negative for pallor.   Neurological: Positive for weakness. Negative for seizures and headaches.   All other systems reviewed and are negative.       Physical Exam:  /78 (BP Location: Left arm, Patient Position: Lying)   Pulse 106   Temp 98 °F (36.7 °C) (Oral)   Resp 16   Ht 170.2 cm (67.01\")   Wt 63.2 kg (139 lb 5.3 oz)   SpO2 96%   BMI 21.82 kg/m²     Physical Exam  Vitals and nursing note reviewed.   Constitutional:       General: She is not in acute distress.     Appearance: Normal appearance. She is not toxic-appearing.      Comments: No obvious distress   HENT:      Head: Normocephalic and atraumatic.      Mouth/Throat:      Mouth: Mucous membranes are moist.   Eyes:      General: No scleral icterus.  Cardiovascular:      Rate and Rhythm: Tachycardia present. Rhythm irregularly " irregular.      Pulses: Normal pulses.      Heart sounds: Normal heart sounds.      Comments: No swelling on legs  Pulmonary:      Effort: Pulmonary effort is normal. No respiratory distress.      Breath sounds: Normal breath sounds. No wheezing or rales.      Comments: Frequent coughing noted  Abdominal:      General: Abdomen is flat.      Palpations: Abdomen is soft.      Tenderness: There is no abdominal tenderness. There is no guarding or rebound.   Musculoskeletal:         General: Normal range of motion.      Cervical back: Normal range of motion and neck supple.      Right lower leg: No edema.      Left lower leg: No edema.   Skin:     General: Skin is warm and dry.   Neurological:      Mental Status: She is alert and oriented to person, place, and time. Mental status is at baseline.                Medications in the Emergency Department:  Medications   sodium chloride 0.9 % flush 10 mL (has no administration in time range)   dilTIAZem (CARDIZEM) 125 mg in 125 mL 0.7% sodium chloride  infusion (5 mg/hr Intravenous Currently Infusing 8/20/22 6537)   nitroglycerin (NITROSTAT) SL tablet 0.4 mg (has no administration in time range)   acetaminophen (TYLENOL) tablet 650 mg (has no administration in time range)   aluminum-magnesium hydroxide-simethicone (MAALOX MAX) 400-400-40 MG/5ML suspension 15 mL (has no administration in time range)   ondansetron (ZOFRAN) injection 4 mg (has no administration in time range)   furosemide (LASIX) injection 40 mg (has no administration in time range)   apixaban (ELIQUIS) tablet 5 mg (has no administration in time range)   albuterol (PROVENTIL) nebulizer solution 0.083% 2.5 mg/3mL (has no administration in time range)   budesonide-formoterol (SYMBICORT) 160-4.5 MCG/ACT inhaler 2 puff (has no administration in time range)   levothyroxine (SYNTHROID, LEVOTHROID) tablet 25 mcg (has no administration in time range)   metoprolol succinate XL (TOPROL-XL) 24 hr tablet 100 mg (has no  administration in time range)   tiotropium (SPIRIVA RESPIMAT) 2.5 mcg/act aerosol solution inhaler (has no administration in time range)   dextrose (GLUTOSE) oral gel 15 g (has no administration in time range)   dextrose (D50W) (25 g/50 mL) IV injection 25 g (has no administration in time range)   glucagon (human recombinant) (GLUCAGEN DIAGNOSTIC) injection 1 mg (has no administration in time range)   cetirizine (zyrTEC) tablet 10 mg (has no administration in time range)   dilTIAZem (CARDIZEM) tablet 30 mg (has no administration in time range)   furosemide (LASIX) injection 40 mg (40 mg Intravenous Given 8/20/22 1018)   metoprolol tartrate (LOPRESSOR) injection 5 mg (5 mg Intravenous Given 8/20/22 1019)   dilTIAZem (CARDIZEM) bolus from bag 1 mg/mL 10 mg (10 mg Intravenous Bolus from Bag 8/20/22 1150)        Labs  Lab Results (last 24 hours)     Procedure Component Value Units Date/Time    CBC & Differential [[id number]]  (Abnormal) Collected: 08/20/22 0743    Specimen: Blood Updated: 08/20/22 0746    Narrative:      The following orders were created for panel order CBC & Differential.  Procedure                               Abnormality         Status                     ---------                               -----------         ------                     CBC Auto Differential[[id number]]        Abnormal            Final result                 Please view results for these tests on the individual orders.    Comprehensive Metabolic Panel [[id number]]  (Abnormal) Collected: 08/20/22 0743    Specimen: Blood Updated: 08/20/22 0818     Glucose 207 mg/dL      BUN 21 mg/dL      Creatinine 0.81 mg/dL      Sodium 137 mmol/L      Potassium 4.1 mmol/L      Chloride 101 mmol/L      CO2 23.1 mmol/L      Calcium 9.1 mg/dL      Total Protein 6.9 g/dL      Albumin 4.00 g/dL      ALT (SGPT) 20 U/L      AST (SGOT) 17 U/L      Alkaline Phosphatase 87 U/L      Total Bilirubin 0.8 mg/dL      Globulin 2.9 gm/dL      A/G Ratio 1.4 g/dL       BUN/Creatinine Ratio 25.9     Anion Gap 12.9 mmol/L      eGFR 74.9 mL/min/1.73      Comment: National Kidney Foundation and American Society of Nephrology (ASN) Task Force recommended calculation based on the Chronic Kidney Disease Epidemiology Collaboration (CKD-EPI) equation refit without adjustment for race.       Narrative:      GFR Normal >60  Chronic Kidney Disease <60  Kidney Failure <15      BNP [[id number]]  (Abnormal) Collected: 08/20/22 0743    Specimen: Blood Updated: 08/20/22 0813     proBNP 5,053.0 pg/mL     Narrative:      Among patients with dyspnea, NT-proBNP is highly sensitive for the detection of acute congestive heart failure. In addition NT-proBNP of <300 pg/ml effectively rules out acute congestive heart failure with 99% negative predictive value.    Results may be falsely decreased if patient taking Biotin.      Troponin [[id number]]  (Normal) Collected: 08/20/22 0743    Specimen: Blood Updated: 08/20/22 0818     Troponin T <0.010 ng/mL     Narrative:      Troponin T Reference Range:  <= 0.03 ng/mL-   Negative for AMI  >0.03 ng/mL-     Abnormal for myocardial necrosis.  Clinicians would have to utilize clinical acumen, EKG, Troponin and serial changes to determine if it is an Acute Myocardial Infarction or myocardial injury due to an underlying chronic condition.       Results may be falsely decreased if patient taking Biotin.      CBC Auto Differential [[id number]]  (Abnormal) Collected: 08/20/22 0743    Specimen: Blood Updated: 08/20/22 0746     WBC 9.53 10*3/mm3      RBC 4.24 10*6/mm3      Hemoglobin 10.3 g/dL      Hematocrit 33.8 %      MCV 79.7 fL      MCH 24.3 pg      MCHC 30.5 g/dL      RDW 15.0 %      RDW-SD 43.7 fl      MPV 11.2 fL      Platelets 382 10*3/mm3      Neutrophil % 61.6 %      Lymphocyte % 23.1 %      Monocyte % 12.4 %      Eosinophil % 1.8 %      Basophil % 0.8 %      Immature Grans % 0.3 %      Neutrophils, Absolute 5.87 10*3/mm3      Lymphocytes, Absolute 2.20 10*3/mm3       Monocytes, Absolute 1.18 10*3/mm3      Eosinophils, Absolute 0.17 10*3/mm3      Basophils, Absolute 0.08 10*3/mm3      Immature Grans, Absolute 0.03 10*3/mm3      nRBC 0.0 /100 WBC     Lipase [[id number]]  (Normal) Collected: 08/20/22 0743    Specimen: Blood Updated: 08/20/22 0818     Lipase 22 U/L     Lactic Acid, Plasma [[id number]]  (Normal) Collected: 08/20/22 1006    Specimen: Blood Updated: 08/20/22 1032     Lactate 1.8 mmol/L     Occult Blood, Fecal By Immunoassay - Stool, Per Rectum [[id number]]  (Normal) Collected: 08/20/22 1035    Specimen: Stool from Per Rectum Updated: 08/20/22 1119     Occult Blood, Fecal by Immunoassay Negative           Imaging:  CT Abdomen Pelvis Without Contrast    Result Date: 8/20/2022  PROCEDURE: CT ABDOMEN PELVIS WO CONTRAST  COMPARISON: [facility], PET, NM PET SKULL BASE TO MID THIGH, 7/21/2021, 11:47.  [facility], US, US RENAL BILATERAL, 5/31/2022, 10:02.  [facility], CR, XR CHEST 1 VW, 8/20/2022, 8:09.  [facility], CT, CT ABDOMEN PELVIS WO CONTRAST, 4/24/2022, 7:24.  INDICATIONS: Abdominal pain, acute, nonlocalized  TECHNIQUE: CT images were created without intravenous contrast.   PROTOCOL:   Standard imaging protocol performed    RADIATION:   DLP: 399.1 mGy*cm   Automated exposure control was utilized to minimize radiation dose.  FINDINGS:  Linear fibrosis or subsegmental atelectasis is seen at the lung bases.  Small pleural effusions are not evident on prior CT scan.  Postoperative changes consistent with gastric pull-through are evident.  The liver is of normal size.  The gallbladder is not abnormally distended.  No pancreatic mass is evident.  3.5 cm circumscribed hypodense right adrenal lesion is consistent with benign adenoma, and is unchanged.  No renal or ureteral stones are seen.  There is no evidence of hydronephrosis.  The urinary bladder is not abnormally distended.  Bowel loops are not abnormally dilated.   Ill-defined increased density is seen in fat adjacent to the posterior aspect of the hepatic flexure, with a small amount of peritoneal fluid adjacent to the inferior right liver lobe.  No drainable fluid collection is evident.  No abnormality is seen in the region of the cecum.  Scattered sigmoid diverticula are evident.  A moderate amount of peritoneal fluid is seen in the pelvis.   The uterus measures 5 cm in transverse dimension.  No pelvic mass is seen.  CONTINUED ON NEXT PAGE...          CT scan of the abdomen and pelvis without contrast demonstrating small pleural effusions.  Ill-defined increased density in fat adjacent to the posterior aspect of the hepatic flexure may represent inflammatory change.  A small amount of peritoneal fluid is seen adjacent to the inferior right liver lobe.  A moderate amount of peritoneal fluid is seen in the pelvis.     NANNETTE EDWARDS MD       Electronically Signed and Approved By: NANNETTE EDWARDS MD on 8/20/2022 at 9:19             XR Chest 1 View    Result Date: 8/20/2022  PROCEDURE: XR CHEST 1 VW  COMPARISON: [facility], CT, CT CHEST W CONTRAST DIAGNOSTIC, 2/07/2022, 13:07.  [facility], CR, CHEST AP/PA 1 VIEW, 12/22/2020, 15:17.  INDICATIONS: SOA Triage Protocol  FINDINGS:  The heart remains normal in size.  Findings consistent with gastric pull-through are again seen.  The lungs are well-expanded and free of infiltrates.  Bony structures appear intact.        No active disease is seen.       NANNETTE EDWARDS MD       Electronically Signed and Approved By: NANNETTE EDWARDS MD on 8/20/2022 at 8:31               Procedures:  ECG 12 Lead      Date/Time: 8/20/2022 7:50 AM  Performed by: Jorge Bang MD  Authorized by: Jorge Bang MD   Interpreted by physician  Comparison: compared with previous ECG from 4/20/2022  Comparison to previous ECG: Changed  Rhythm: atrial fibrillation  Rate: tachycardic  BPM: 115  QRS axis: normal  ST Segments: ST segments  normal  Comments: No P-waves. T-waves non-specific abnormalities in lateral leads.           Progress                            Medical Decision Making:  MDM  Number of Diagnoses or Management Options     Amount and/or Complexity of Data Reviewed  Clinical lab tests: reviewed  Tests in the radiology section of CPT®: reviewed  Decide to obtain previous medical records or to obtain history from someone other than the patient: yes    Critical Care  Total time providing critical care: 30-74 minutes (I spent greater than 35 minutes in critical care for this patient, excluding any time spent on any billable procedures.    I reviewed the blood work and vital signs including pulse oximetry, cardiac output, chest x-ray and EKG.    I treated her atrial fibrillation with rapid ventricular rate with IV metoprolol, followed by IV diltiazem and started a diltiazem infusion and titrating for rate control.    Gave her some Lasix for her CHF exacerbation for diuresis.    I reassessed the patient at the bedside and she looks stable at this time but still with rapid heart rate here, and I have consulted with her cardiologist to be involved in this patient's medical care.)         This patient presents with dyspnea on exertion for a while now, and found to be in rapid A. fib with RVR but also in a component of CHF exacerbation with elevated proBNP of 5000.    I did treat her with some IV metoprolol initially for rate control but this really had no effect on her heart rate here in the ED, going about 135 bpm now.    I transitioned her over to diltiazem bolus and drip and also diuresed with Lasix and called her cardiologist for admission to a monitored bed.          Final diagnoses:   Abdominal pain, acute, epigastric   Dyspnea on minimal exertion   Atrial fibrillation with RVR (HCC)   Acute on chronic congestive heart failure, unspecified heart failure type (HCC)   Acute anemia        Disposition:  ED Disposition     ED Disposition    Decision to Admit    Condition   --    Comment   Level of Care: Telemetry [5]   Diagnosis: Atrial fibrillation with RVR (HCC) [503707]   Admitting Physician: CHAGO VILLALBA [[id number]]   Attending Physician: CHAGO VILLALBA [[id number]]   Isolate for COVID?: No [0]   Certification: I Certify That Inpatient Hospital Services Are Medically Necessary For Greater Than 2 Midnights               This medical record created using voice recognition software.      Documentation assistance provided by Leah Glez acting as scribe for Dr. Jorge Bang MD. Information recorded by the scribe was done at my direction and has been verified and validated by me.        Leah Glez  08/20/22 7428       Jorge Bang MD  08/20/22 6260

## 2022-08-21 ENCOUNTER — APPOINTMENT (OUTPATIENT)
Dept: CARDIOLOGY | Facility: HOSPITAL | Age: 77
End: 2022-08-21

## 2022-08-21 LAB
ALBUMIN SERPL-MCNC: 3.7 G/DL (ref 3.5–5.2)
ALBUMIN/GLOB SERPL: 1.3 G/DL
ALP SERPL-CCNC: 79 U/L (ref 39–117)
ALT SERPL W P-5'-P-CCNC: 21 U/L (ref 1–33)
ANION GAP SERPL CALCULATED.3IONS-SCNC: 15.1 MMOL/L (ref 5–15)
AST SERPL-CCNC: 21 U/L (ref 1–32)
BASOPHILS # BLD AUTO: 0.08 10*3/MM3 (ref 0–0.2)
BASOPHILS NFR BLD AUTO: 0.8 % (ref 0–1.5)
BH CV ECHO MEAS - AO ROOT DIAM: 2.8 CM
BH CV ECHO MEAS - EF(MOD-BP): 50 %
BH CV ECHO MEAS - IVSD: 0.9 CM
BH CV ECHO MEAS - LA DIMENSION: 2.8 CM
BH CV ECHO MEAS - LAT PEAK E' VEL: 15 CM/SEC
BH CV ECHO MEAS - LVIDD: 3.7 CM
BH CV ECHO MEAS - LVIDS: 2.4 CM
BH CV ECHO MEAS - LVPWD: 1.1 CM
BH CV ECHO MEAS - MED PEAK E' VEL: 16 CM/SEC
BH CV ECHO MEAS - MV A MAX VEL: 26 CM/SEC
BH CV ECHO MEAS - MV DEC TIME: 190 MSEC
BH CV ECHO MEAS - MV E MAX VEL: 88 CM/SEC
BH CV ECHO MEAS - MV E/A: 3.3
BH CV ECHO MEAS - RVSP: 22 MMHG
BH CV ECHO MEASUREMENTS AVERAGE E/E' RATIO: 5.68
BILIRUB SERPL-MCNC: 0.8 MG/DL (ref 0–1.2)
BUN SERPL-MCNC: 18 MG/DL (ref 8–23)
BUN/CREAT SERPL: 22.2 (ref 7–25)
CALCIUM SPEC-SCNC: 9.2 MG/DL (ref 8.6–10.5)
CHLORIDE SERPL-SCNC: 99 MMOL/L (ref 98–107)
CO2 SERPL-SCNC: 25.9 MMOL/L (ref 22–29)
CREAT SERPL-MCNC: 0.81 MG/DL (ref 0.57–1)
DEPRECATED RDW RBC AUTO: 41.1 FL (ref 37–54)
EGFRCR SERPLBLD CKD-EPI 2021: 74.9 ML/MIN/1.73
EOSINOPHIL # BLD AUTO: 0.25 10*3/MM3 (ref 0–0.4)
EOSINOPHIL NFR BLD AUTO: 2.6 % (ref 0.3–6.2)
ERYTHROCYTE [DISTWIDTH] IN BLOOD BY AUTOMATED COUNT: 14.8 % (ref 12.3–15.4)
GLOBULIN UR ELPH-MCNC: 2.8 GM/DL
GLUCOSE BLDC GLUCOMTR-MCNC: 114 MG/DL (ref 70–99)
GLUCOSE BLDC GLUCOMTR-MCNC: 146 MG/DL (ref 70–99)
GLUCOSE BLDC GLUCOMTR-MCNC: 159 MG/DL (ref 70–99)
GLUCOSE SERPL-MCNC: 115 MG/DL (ref 65–99)
HCT VFR BLD AUTO: 33.3 % (ref 34–46.6)
HEMOCCULT STL QL IA: NEGATIVE
HGB BLD-MCNC: 10.5 G/DL (ref 12–15.9)
IMM GRANULOCYTES # BLD AUTO: 0.02 10*3/MM3 (ref 0–0.05)
IMM GRANULOCYTES NFR BLD AUTO: 0.2 % (ref 0–0.5)
INR PPP: 1.51 (ref 0.86–1.15)
IVRT: 61 MSEC
LEFT ATRIUM VOLUME INDEX: 39 ML/M2
LYMPHOCYTES # BLD AUTO: 2.47 10*3/MM3 (ref 0.7–3.1)
LYMPHOCYTES NFR BLD AUTO: 26.1 % (ref 19.6–45.3)
MAXIMAL PREDICTED HEART RATE: 143 BPM
MCH RBC QN AUTO: 24.5 PG (ref 26.6–33)
MCHC RBC AUTO-ENTMCNC: 31.5 G/DL (ref 31.5–35.7)
MCV RBC AUTO: 77.6 FL (ref 79–97)
MONOCYTES # BLD AUTO: 1.51 10*3/MM3 (ref 0.1–0.9)
MONOCYTES NFR BLD AUTO: 16 % (ref 5–12)
NEUTROPHILS NFR BLD AUTO: 5.12 10*3/MM3 (ref 1.7–7)
NEUTROPHILS NFR BLD AUTO: 54.3 % (ref 42.7–76)
NRBC BLD AUTO-RTO: 0 /100 WBC (ref 0–0.2)
PLATELET # BLD AUTO: 340 10*3/MM3 (ref 140–450)
PMV BLD AUTO: 11.2 FL (ref 6–12)
POTASSIUM SERPL-SCNC: 3.2 MMOL/L (ref 3.5–5.2)
PROT SERPL-MCNC: 6.5 G/DL (ref 6–8.5)
PROTHROMBIN TIME: 18.4 SECONDS (ref 11.8–14.9)
RBC # BLD AUTO: 4.29 10*6/MM3 (ref 3.77–5.28)
SODIUM SERPL-SCNC: 140 MMOL/L (ref 136–145)
STRESS TARGET HR: 122 BPM
WBC NRBC COR # BLD: 9.45 10*3/MM3 (ref 3.4–10.8)

## 2022-08-21 PROCEDURE — 80053 COMPREHEN METABOLIC PANEL: CPT | Performed by: INTERNAL MEDICINE

## 2022-08-21 PROCEDURE — 25010000002 FUROSEMIDE PER 20 MG: Performed by: INTERNAL MEDICINE

## 2022-08-21 PROCEDURE — 36415 COLL VENOUS BLD VENIPUNCTURE: CPT | Performed by: INTERNAL MEDICINE

## 2022-08-21 PROCEDURE — 82274 ASSAY TEST FOR BLOOD FECAL: CPT | Performed by: INTERNAL MEDICINE

## 2022-08-21 PROCEDURE — 85610 PROTHROMBIN TIME: CPT | Performed by: INTERNAL MEDICINE

## 2022-08-21 PROCEDURE — 63710000001 INSULIN LISPRO (HUMAN) PER 5 UNITS: Performed by: INTERNAL MEDICINE

## 2022-08-21 PROCEDURE — 85025 COMPLETE CBC W/AUTO DIFF WBC: CPT | Performed by: INTERNAL MEDICINE

## 2022-08-21 PROCEDURE — 93306 TTE W/DOPPLER COMPLETE: CPT

## 2022-08-21 PROCEDURE — 94799 UNLISTED PULMONARY SVC/PX: CPT

## 2022-08-21 PROCEDURE — 82962 GLUCOSE BLOOD TEST: CPT

## 2022-08-21 RX ORDER — MIDODRINE HYDROCHLORIDE 2.5 MG/1
2.5 TABLET ORAL
Status: DISCONTINUED | OUTPATIENT
Start: 2022-08-21 | End: 2022-08-23

## 2022-08-21 RX ORDER — DILTIAZEM HYDROCHLORIDE 60 MG/1
60 TABLET, FILM COATED ORAL 3 TIMES DAILY
Status: DISCONTINUED | OUTPATIENT
Start: 2022-08-21 | End: 2022-08-23

## 2022-08-21 RX ADMIN — METOPROLOL SUCCINATE 100 MG: 100 TABLET, EXTENDED RELEASE ORAL at 20:47

## 2022-08-21 RX ADMIN — Medication 10 ML: at 08:57

## 2022-08-21 RX ADMIN — Medication 5 MG/HR: at 04:46

## 2022-08-21 RX ADMIN — MIDODRINE HYDROCHLORIDE 2.5 MG: 2.5 TABLET ORAL at 16:59

## 2022-08-21 RX ADMIN — FUROSEMIDE 40 MG: 10 INJECTION, SOLUTION INTRAMUSCULAR; INTRAVENOUS at 17:47

## 2022-08-21 RX ADMIN — DILTIAZEM HYDROCHLORIDE 60 MG: 60 TABLET, FILM COATED ORAL at 20:47

## 2022-08-21 RX ADMIN — DILTIAZEM HYDROCHLORIDE 30 MG: 30 TABLET, FILM COATED ORAL at 08:57

## 2022-08-21 RX ADMIN — LEVOTHYROXINE SODIUM 25 MCG: 0.03 TABLET ORAL at 07:37

## 2022-08-21 RX ADMIN — CETIRIZINE HYDROCHLORIDE TABLETS 10 MG: 10 TABLET, FILM COATED ORAL at 08:57

## 2022-08-21 RX ADMIN — APIXABAN 5 MG: 5 TABLET, FILM COATED ORAL at 08:57

## 2022-08-21 RX ADMIN — FUROSEMIDE 40 MG: 10 INJECTION, SOLUTION INTRAMUSCULAR; INTRAVENOUS at 08:57

## 2022-08-21 RX ADMIN — APIXABAN 5 MG: 5 TABLET, FILM COATED ORAL at 20:47

## 2022-08-21 RX ADMIN — INSULIN LISPRO 2 UNITS: 100 INJECTION, SOLUTION INTRAVENOUS; SUBCUTANEOUS at 12:13

## 2022-08-21 RX ADMIN — METOPROLOL SUCCINATE 100 MG: 100 TABLET, EXTENDED RELEASE ORAL at 08:57

## 2022-08-21 RX ADMIN — DILTIAZEM HYDROCHLORIDE 30 MG: 30 TABLET, FILM COATED ORAL at 16:59

## 2022-08-21 NOTE — PROGRESS NOTES
Date of service: 8/21/2022    Subjective: No chest pain, SOB, lightheadedness or palpitations    Review of systems: No headaches, vertigo, fever, chills, nausea, vomiting, abdominal pain, GI or  bleed.  No TIA or CVA-like symptoms    Physical examinations: She was in no pain or respiratory distress.  Vital signs: Reviewed  HEENT: Sclera nonicteric   Neck: Extended external jugular vein.  No carotid bruit.  Carotid upstroke is normal  Chest: CTA. No wheezes or rales.  Cardiac: Irregularly irregular.  No S3 gallop.  Extremities:  No edema, cyanosis or clubbing.  Pulses intact.  Neuro: Alert, oriented x3 no facial droop and speech was clear.    Records: Reviewed    Assessment and plan:     1.  Persistent atrial fibrillation with intermittent RVR  2.  Acute on chronic diastolic CHF  3.  Chest pain, mostly due to #1  4.  Anxiety-depression  5.  Hypertension with intermittent low blood pressure.  We will add midodrine 2.5 mg p.o. twice daily.  6.  Ambulated in her room and HR went up from 80-90 bpm to 110-120 bpm  7.  Change Cardizem to 60 mg p.o. twice daily.  8.  Continue metoprolol succinate 100 mg p.o. twice daily.

## 2022-08-21 NOTE — PLAN OF CARE
Problem: Adult Inpatient Plan of Care  Goal: Plan of Care Review  Outcome: Ongoing, Progressing  Goal: Patient-Specific Goal (Individualized)  Outcome: Ongoing, Progressing  Goal: Absence of Hospital-Acquired Illness or Injury  Outcome: Ongoing, Progressing  Intervention: Identify and Manage Fall Risk  Intervention: Prevent and Manage VTE (Venous Thromboembolism) Risk  Goal: Optimal Comfort and Wellbeing  Outcome: Ongoing, Progressing  Goal: Readiness for Transition of Care  Outcome: Ongoing, Progressing   Goal Outcome Evaluation:  Plan of Care Reviewed With: patient        Progress: no change  Outcome Evaluation: Patient states she feels like she is at her baseline, has had no chest pain or issues during this shift. Cardizem gtt was stopped and PO was increased. No other concerns at this time. Will continue to monitor, call light in reach.

## 2022-08-21 NOTE — PROGRESS NOTES
[facility]     Progress Note    Patient Name: Camilla Marcos  : 1945  MRN: [id number]  Primary Care Physician:  Homero Ro MD  Date of admission: 2022    Subjective   Subjective     Chief Complaint: Patient is feeling much better she has had good response to diuretics and breathing better cardiac status is stabilized patient is still on Cardizem drip    HPI:  Patient reports stable and doing well with Cardizem drip and IV Lasix    Review of Systems   All systems were reviewed and negative except for: Reviewed    Objective   Objective     Vitals:   Temp:  [97.3 °F (36.3 °C)-98.2 °F (36.8 °C)] 97.7 °F (36.5 °C)  Heart Rate:  [] 63  Resp:  [15-20] 20  BP: ()/(46-88) 102/46    Physical Exam    Constitutional: Awake, alert   Eyes: PERRLA, sclerae anicteric, no conjunctival injection   HENT: NCAT, mucous membranes moist   Neck: Supple, no thyromegaly, no lymphadenopathy, trachea midline   Respiratory: Clear to auscultation bilaterally, nonlabored respirations    Cardiovascular: RRR, no murmurs, rubs, or gallops, palpable pedal pulses bilaterally   Gastrointestinal: Positive bowel sounds, soft, nontender, nondistended   Musculoskeletal: No bilateral ankle edema, no clubbing or cyanosis to extremities   Psychiatric: Appropriate affect, cooperative   Neurologic: Oriented x 3, strength symmetric in all extremities, Cranial Nerves grossly intact to confrontation, speech clear   Skin: No rashes   Bilateral rales but improved  Result Review    Result Review:  I have personally reviewed the results from the time of this admission to 2022 11:42 EDT and agree with these findings:  [x]  Laboratory anemia  []  Microbiology  []  Radiology  []  EKG/Telemetry   []  Cardiology/Vascular   []  Pathology  []  Old records  []  Other:  Most notable findings include: CHF anemia    Assessment & Plan   Assessment / Plan     Brief Patient Summary:  Camilla Marcos is a 77 y.o. female who patient  admitted with CHF and atrial fibrillation improving    Active Hospital Problems:  Active Hospital Problems    Diagnosis    • Atrial fibrillation with RVR (HCC)        Plan:   Continue the current management    DVT prophylaxis:  Medical and mechanical DVT prophylaxis orders are present.    CODE STATUS:   Level Of Support Discussed With: Patient  Code Status (Patient has no pulse and is not breathing): CPR (Attempt to Resuscitate)  Medical Interventions (Patient has pulse or is breathing): Full Support    Disposition:  I expect patient to be discharged sent rate has been stopped diuretics will be continued.    Electronically signed by Homero Ro MD, 08/21/22, 11:42 AM EDT.

## 2022-08-22 LAB
ANION GAP SERPL CALCULATED.3IONS-SCNC: 12.5 MMOL/L (ref 5–15)
BASOPHILS # BLD AUTO: 0.05 10*3/MM3 (ref 0–0.2)
BASOPHILS NFR BLD AUTO: 0.6 % (ref 0–1.5)
BUN SERPL-MCNC: 24 MG/DL (ref 8–23)
BUN/CREAT SERPL: 25.8 (ref 7–25)
CALCIUM SPEC-SCNC: 9 MG/DL (ref 8.6–10.5)
CHLORIDE SERPL-SCNC: 98 MMOL/L (ref 98–107)
CO2 SERPL-SCNC: 26.5 MMOL/L (ref 22–29)
CREAT SERPL-MCNC: 0.93 MG/DL (ref 0.57–1)
DEPRECATED RDW RBC AUTO: 41.1 FL (ref 37–54)
EGFRCR SERPLBLD CKD-EPI 2021: 63.4 ML/MIN/1.73
EOSINOPHIL # BLD AUTO: 0.28 10*3/MM3 (ref 0–0.4)
EOSINOPHIL NFR BLD AUTO: 3.3 % (ref 0.3–6.2)
ERYTHROCYTE [DISTWIDTH] IN BLOOD BY AUTOMATED COUNT: 14.9 % (ref 12.3–15.4)
FERRITIN SERPL-MCNC: 22.72 NG/ML (ref 13–150)
FOLATE SERPL-MCNC: 13.1 NG/ML (ref 4.78–24.2)
GLUCOSE BLDC GLUCOMTR-MCNC: 110 MG/DL (ref 70–99)
GLUCOSE BLDC GLUCOMTR-MCNC: 142 MG/DL (ref 70–99)
GLUCOSE BLDC GLUCOMTR-MCNC: 143 MG/DL (ref 70–99)
GLUCOSE SERPL-MCNC: 240 MG/DL (ref 65–99)
HAPTOGLOB SERPL-MCNC: 179 MG/DL (ref 30–200)
HCT VFR BLD AUTO: 36.3 % (ref 34–46.6)
HCYS SERPL-MCNC: 9 UMOL/L (ref 0–15)
HGB BLD-MCNC: 11.3 G/DL (ref 12–15.9)
IMM GRANULOCYTES # BLD AUTO: 0.02 10*3/MM3 (ref 0–0.05)
IMM GRANULOCYTES NFR BLD AUTO: 0.2 % (ref 0–0.5)
IRON 24H UR-MRATE: 30 MCG/DL (ref 37–145)
IRON SATN MFR SERPL: 7 % (ref 20–50)
LYMPHOCYTES # BLD AUTO: 2.04 10*3/MM3 (ref 0.7–3.1)
LYMPHOCYTES NFR BLD AUTO: 24.2 % (ref 19.6–45.3)
MCH RBC QN AUTO: 24.2 PG (ref 26.6–33)
MCHC RBC AUTO-ENTMCNC: 31.1 G/DL (ref 31.5–35.7)
MCV RBC AUTO: 77.9 FL (ref 79–97)
MONOCYTES # BLD AUTO: 1.28 10*3/MM3 (ref 0.1–0.9)
MONOCYTES NFR BLD AUTO: 15.2 % (ref 5–12)
NEUTROPHILS NFR BLD AUTO: 4.77 10*3/MM3 (ref 1.7–7)
NEUTROPHILS NFR BLD AUTO: 56.5 % (ref 42.7–76)
NRBC BLD AUTO-RTO: 0 /100 WBC (ref 0–0.2)
PLATELET # BLD AUTO: 435 10*3/MM3 (ref 140–450)
PMV BLD AUTO: 10.7 FL (ref 6–12)
POTASSIUM SERPL-SCNC: 3.8 MMOL/L (ref 3.5–5.2)
RBC # BLD AUTO: 4.66 10*6/MM3 (ref 3.77–5.28)
RETICS # AUTO: 0.1 10*6/MM3 (ref 0.02–0.13)
RETICS/RBC NFR AUTO: 2.35 % (ref 0.7–1.9)
SODIUM SERPL-SCNC: 137 MMOL/L (ref 136–145)
TIBC SERPL-MCNC: 457 MCG/DL (ref 298–536)
TRANSFERRIN SERPL-MCNC: 307 MG/DL (ref 200–360)
WBC NRBC COR # BLD: 8.44 10*3/MM3 (ref 3.4–10.8)

## 2022-08-22 PROCEDURE — 94799 UNLISTED PULMONARY SVC/PX: CPT

## 2022-08-22 PROCEDURE — 85045 AUTOMATED RETICULOCYTE COUNT: CPT | Performed by: INTERNAL MEDICINE

## 2022-08-22 PROCEDURE — 83010 ASSAY OF HAPTOGLOBIN QUANT: CPT | Performed by: INTERNAL MEDICINE

## 2022-08-22 PROCEDURE — 84466 ASSAY OF TRANSFERRIN: CPT | Performed by: INTERNAL MEDICINE

## 2022-08-22 PROCEDURE — 82728 ASSAY OF FERRITIN: CPT | Performed by: INTERNAL MEDICINE

## 2022-08-22 PROCEDURE — 83090 ASSAY OF HOMOCYSTEINE: CPT | Performed by: INTERNAL MEDICINE

## 2022-08-22 PROCEDURE — 97161 PT EVAL LOW COMPLEX 20 MIN: CPT

## 2022-08-22 PROCEDURE — 84156 ASSAY OF PROTEIN URINE: CPT | Performed by: INTERNAL MEDICINE

## 2022-08-22 PROCEDURE — 80048 BASIC METABOLIC PNL TOTAL CA: CPT | Performed by: INTERNAL MEDICINE

## 2022-08-22 PROCEDURE — 25010000002 NA FERRIC GLUC CPLX PER 12.5 MG: Performed by: SPECIALIST

## 2022-08-22 PROCEDURE — 82962 GLUCOSE BLOOD TEST: CPT

## 2022-08-22 PROCEDURE — 83540 ASSAY OF IRON: CPT | Performed by: INTERNAL MEDICINE

## 2022-08-22 PROCEDURE — 85025 COMPLETE CBC W/AUTO DIFF WBC: CPT | Performed by: INTERNAL MEDICINE

## 2022-08-22 PROCEDURE — 84166 PROTEIN E-PHORESIS/URINE/CSF: CPT | Performed by: INTERNAL MEDICINE

## 2022-08-22 PROCEDURE — 25010000002 FUROSEMIDE PER 20 MG: Performed by: INTERNAL MEDICINE

## 2022-08-22 PROCEDURE — 97165 OT EVAL LOW COMPLEX 30 MIN: CPT

## 2022-08-22 PROCEDURE — 83921 ORGANIC ACID SINGLE QUANT: CPT | Performed by: INTERNAL MEDICINE

## 2022-08-22 PROCEDURE — 82746 ASSAY OF FOLIC ACID SERUM: CPT | Performed by: INTERNAL MEDICINE

## 2022-08-22 RX ORDER — LORAZEPAM 0.5 MG/1
0.5 TABLET ORAL EVERY 12 HOURS SCHEDULED
Status: DISCONTINUED | OUTPATIENT
Start: 2022-08-22 | End: 2022-08-24 | Stop reason: HOSPADM

## 2022-08-22 RX ADMIN — SODIUM CHLORIDE 125 MG: 9 INJECTION, SOLUTION INTRAVENOUS at 19:38

## 2022-08-22 RX ADMIN — FUROSEMIDE 40 MG: 10 INJECTION, SOLUTION INTRAMUSCULAR; INTRAVENOUS at 08:25

## 2022-08-22 RX ADMIN — FUROSEMIDE 40 MG: 10 INJECTION, SOLUTION INTRAMUSCULAR; INTRAVENOUS at 18:06

## 2022-08-22 RX ADMIN — LEVOTHYROXINE SODIUM 25 MCG: 0.03 TABLET ORAL at 08:25

## 2022-08-22 RX ADMIN — MIDODRINE HYDROCHLORIDE 2.5 MG: 2.5 TABLET ORAL at 16:54

## 2022-08-22 RX ADMIN — DILTIAZEM HYDROCHLORIDE 60 MG: 60 TABLET, FILM COATED ORAL at 20:23

## 2022-08-22 RX ADMIN — METOPROLOL SUCCINATE 100 MG: 100 TABLET, EXTENDED RELEASE ORAL at 08:25

## 2022-08-22 RX ADMIN — APIXABAN 5 MG: 5 TABLET, FILM COATED ORAL at 20:23

## 2022-08-22 RX ADMIN — DILTIAZEM HYDROCHLORIDE 60 MG: 60 TABLET, FILM COATED ORAL at 16:54

## 2022-08-22 RX ADMIN — MIDODRINE HYDROCHLORIDE 2.5 MG: 2.5 TABLET ORAL at 08:25

## 2022-08-22 RX ADMIN — METOPROLOL SUCCINATE 100 MG: 100 TABLET, EXTENDED RELEASE ORAL at 20:23

## 2022-08-22 RX ADMIN — Medication 10 ML: at 08:25

## 2022-08-22 RX ADMIN — APIXABAN 5 MG: 5 TABLET, FILM COATED ORAL at 08:25

## 2022-08-22 RX ADMIN — DILTIAZEM HYDROCHLORIDE 60 MG: 60 TABLET, FILM COATED ORAL at 08:25

## 2022-08-22 RX ADMIN — CETIRIZINE HYDROCHLORIDE TABLETS 10 MG: 10 TABLET, FILM COATED ORAL at 08:25

## 2022-08-22 RX ADMIN — MIDODRINE HYDROCHLORIDE 2.5 MG: 2.5 TABLET ORAL at 12:29

## 2022-08-22 NOTE — THERAPY EVALUATION
Acute Care - Physical Therapy Initial Evaluation  GILDARDO Steve     Patient Name: Camilla Marcos  : 1945  MRN: [id number]  Today's Date: 2022      Visit Dx:     ICD-10-CM ICD-9-CM   1. Atrial fibrillation with RVR (HCC)  I48.91 427.31   2. Abdominal pain, acute, epigastric  R10.13 789.06     338.19   3. Dyspnea on minimal exertion  R06.00 786.09   4. Acute on chronic congestive heart failure, unspecified heart failure type (HCC)  I50.9 428.0   5. Acute anemia  D64.9 285.9   6. Decreased activities of daily living (ADL)  Z78.9 V49.89   7. 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)   • History of breast cancer   • Hyperlipidemia   • Hypertension   • Goiter   • Hypothyroidism, unspecified   • IBS (irritable bowel syndrome)   • Mediastinal mass   • Osteoporosis   • Varicose veins of other specified sites   • Dysphagia   • History of esophagectomy   • Esophageal dysphagia   • Gastric leak   • PAF with RVR (paroxysmal atrial fibrillation) (CMS/HCC)   • Gastric fistula   • Gastro-esophageal reflux disease without esophagitis   • Nephrolithiasis   • Neck mass   • Adrenal mass (HCC)   • Atrial fibrillation with RVR (HCC)     Past Medical History:   Diagnosis Date   • A-fib ([facility])     dr. Givens - [place]   • Anxiety    • Arthritis    • Breast cancer (HCC)    • COVID-19 virus infection 2021   • Delayed emergence from anesthesia     history   • Depression     situational  gets upset when in hospital    • Diabetes (HCC)     diet controlled  has weight loss   • Dysphagia    • Esophageal cancer (HCC) 2019   • GERD (gastroesophageal reflux disease)    • History of mononucleosis    • History of transfusion     no reaction   • HX: breast cancer     right   • Hypertension    • Osteoporosis    • Thyroid disorder      hypothyroid   • Thyroid nodule      Past Surgical History:   Procedure Laterality Date   • BREAST BIOPSY Right 2001   • BREAST BIOPSY Left 1990   • BREAST LUMPECTOMY Right    • COLONOSCOPY     • CYSTOSCOPY URETEROSCOPY Left 4/27/2022    Procedure: CYSTOSCOPY, LEFT URETEROSCOPY, LASERTRIPSY, STENT INSERTION;  Surgeon: Jason Grajeda MD;  Location: [facility]  Service: Urology;  Laterality: Left;   • ENDOSCOPY  02/06/2019   • ENDOSCOPY  08/06/2018   • ENDOSCOPY N/A 09/16/2019    Procedure: ESOPHAGOGASTRODUODENOSCOPY with DILATATION (12-15mm balloon);  Surgeon: Aldair Booker MD;  Location: [facility] ENDOSCOPY;  Service: Gastroenterology   • ENDOSCOPY N/A 12/16/2019    Procedure: ESOPHAGOGASTRODUODENOSCOPY with balloon dilitation 15-18mm) up to 16mm;  Surgeon: Aldair Booker MD;  Location: [facility] ENDOSCOPY;  Service: Gastroenterology   • ENDOSCOPY N/A 07/24/2020    Procedure: ESOPHAGOGASTRODUODENOSCOPY with balloon dilation(12mm-15mm up to 14.5mm) of esophageal anastomosis stricture;  Surgeon: Aldair Booker MD;  Location: [facility] ENDOSCOPY;  Service: Gastroenterology;  Laterality: N/A;  esophageal stricture   • ENDOSCOPY N/A 05/13/2021    Procedure: ESOPHAGOGASTRODUODENOSCOPY WITH BALLOON DILATION UP TO 19MM;  Surgeon: Lily Bettencourt MD;  Location: [facility] ENDOSCOPY;  Service: Gastroenterology;  Laterality: N/A;  ANASTAMOTIC STRICTURE   • ENDOSCOPY N/A 06/24/2021    Procedure: ESOPHAGOGASTRODUODENOSCOPY WITH  DILATATION WITH BALLOON (18-20MM, UP TO 20MM);  Surgeon: Lily Bettencourt MD;  Location: [facility] ENDOSCOPY;  Service: Gastroenterology;  Laterality: N/A;  ESOPHAGOGASTRO ANASTOMOTIC STRICTURE   • ENDOSCOPY N/A 07/22/2021    Procedure: ESOPHAGOGASTRODUodenoscopy;  Surgeon: Lily Bettencourt MD;  Location: [facility] ENDOSCOPY;  Service: Gastroenterology;  Laterality: N/A;  esophageal pleural fistula   • ENDOSCOPY N/A 07/23/2021    Procedure: ESOPHAGOGASTRODUODENOSCOPY with stent placement;  Surgeon:  Lily Bettencourt MD;  Location: [facility] MAIN OR;  Service: Gastroenterology;  Laterality: N/A;   • ENDOSCOPY N/A 12/01/2021    Procedure: ESOPHAGOGASTRODUODENOSCOPY WITH STENT REMOVAL;  Surgeon: Lily Bettencourt MD;  Location: [facility] MAIN OR;  Service: Gastroenterology;  Laterality: N/A;   • ENDOSCOPY W/ STENT PLACEMENT/REMOVAL N/A 10/06/2021    Procedure: ESOPHAGOGASTRODUODENOSCOPY WITH STENT removal and possible replacement;  Surgeon: Lily Bettencourt MD;  Location: [facility] MAIN OR;  Service: Gastroenterology;  Laterality: N/A;   • ESOPHAGOGASTRECTOMY  03/04/2019    [place]   • ESOPHAGUS SURGERY      had a stent placed for hole in esophagus   • HEAD/NECK ABSCESS INCISION AND DRAINAGE N/A 6/28/2022    Procedure: Excision of a superficial neck mass;  Surgeon: Lily Bettencourt MD;  Location: [facility] MAIN OR;  Service: Cardiothoracic;  Laterality: N/A;   • JEJUNOSTOMY N/A 07/26/2021    Procedure: OPEN JEJUNOSTOMY TUBE;  Surgeon: Bulmaro Coreas Jr., MD;  Location: [facility] MAIN OR;  Service: General;  Laterality: N/A;   • LAPAROSCOPIC TUBAL LIGATION     • THORACOTOMY Right 08/17/2018    right vats biopsy of mediastinal mass, right thoracotomy     PT Assessment (last 12 hours)     PT Evaluation and Treatment     Row Name 08/22/22 1400          Physical Therapy Time and Intention    Document Type evaluation  -AV     Mode of Treatment individual therapy;physical therapy  -AV     Row Name 08/22/22 1400          General Information    Patient Profile Reviewed yes  -AV     Prior Level of Function independent:;all household mobility;gait;transfer;ADL's  Ambulated without an assistive device. No home O2.  -AV     Equipment Currently Used at Home none  -AV     Existing Precautions/Restrictions fall  -AV     Row Name 08/22/22 1400          Living Environment    Current Living Arrangements home  -AV     Home Accessibility stairs to enter home  -AV     People in Home alone  -AV     Row Name 08/22/22 1400          Home Main Entrance     Number of Stairs, Main Entrance three  -AV     Stair Railings, Main Entrance --  Grab bars with stairs at garage entrance  -AV     Row Name 08/22/22 1400          Range of Motion (ROM)    Range of Motion bilateral lower extremities;ROM is WFL  -AV     Row Name 08/22/22 1400          Strength (Manual Muscle Testing)    Strength (Manual Muscle Testing) bilateral lower extremities;strength is WFL  -AV     Row Name 08/22/22 1400          Bed Mobility    Bed Mobility supine-sit-supine  -AV     Supine-Sit-Supine [place] (Bed Mobility) standby assist  -AV     Row Name 08/22/22 1400          Transfers    Transfers sit-stand transfer;stand-sit transfer  -AV     Comment, (Transfers) Patient declines use of assistive device and HHA from therapist for transfers and ambulation.  -AV     Sit-Stand [place] (Transfers) contact guard;1 person assist  -AV     Stand-Sit [place] (Transfers) contact guard;1 person assist  -AV     Row Name 08/22/22 1400          Gait/Stairs (Locomotion)    Gait/Stairs Locomotion gait/ambulation independence;gait/ambulation assistive device;distance ambulated  -AV     [place] Level (Gait) contact guard  -AV     Assistive Device (Gait) --  Patient declines use of assistive device and HHA from therapist. Reaches out for objects in room to steady self.  -AV     Distance in Feet (Gait) 40  -AV     Left Sided Gait Deviations knee buckling, left side  -AV     Row Name 08/22/22 1400          Safety Issues, Functional Mobility    Safety Issues Affecting Function (Mobility) awareness of need for assistance;insight into deficits/self-awareness;judgment  -AV     Impairments Affecting Function (Mobility) balance;endurance/activity tolerance;shortness of breath  -AV     Row Name 08/22/22 1400          Balance    Balance Assessment standing dynamic balance  -AV     Dynamic Standing Balance contact guard;verbal cues  -AV     Position/Device Used, Standing Balance unsupported  -     Row Name  08/22/22 1400          Plan of Care Review    Plan of Care Reviewed With patient  -AV     Progress no change  -AV     Outcome Evaluation Patient presents with deficits in balance, endurance, transfers, and ambulation. Patient will benefit from skilled PT services to address these mobility deficits and decrease risk of falls. Recommend rehab placement following hospital discharge to maximize independence.  -AV     Row Name 08/22/22 1400          Therapy Assessment/Plan (PT)    Rehab Potential (PT) good, to achieve stated therapy goals  -AV     Criteria for Skilled Interventions Met (PT) yes;meets criteria  -AV     Therapy Frequency (PT) daily  -AV     Problem List (PT) problems related to;balance;mobility  -AV     Activity Limitations Related to Problem List (PT) unable to transfer safely;unable to ambulate safely  -AV     Row Name 08/22/22 1400          PT Evaluation Complexity    History, PT Evaluation Complexity 1-2 personal factors and/or comorbidities  -AV     Examination of Body Systems (PT Eval Complexity) total of 4 or more elements  -AV     Clinical Presentation (PT Evaluation Complexity) stable  -AV     Clinical Decision Making (PT Evaluation Complexity) low complexity  -AV     Overall Complexity (PT Evaluation Complexity) low complexity  -AV     Row Name 08/22/22 1400          Therapy Plan Review/Discharge Plan (PT)    Therapy Plan Review (PT) evaluation/treatment results reviewed;patient  -AV     Row Name 08/22/22 1400          Physical Therapy Goals    Transfer Goal Selection (PT) transfer, PT goal 1  -AV     Gait Training Goal Selection (PT) gait training, PT goal 1  -AV     Row Name 08/22/22 1400          Transfer Goal 1 (PT)    Activity/Assistive Device (Transfer Goal 1, PT) transfers, all;walker, rolling  -AV     [place] Level/Cues Needed (Transfer Goal 1, PT) modified independence  -AV     Time Frame (Transfer Goal 1, PT) 10 days  -AV     Row Name 08/22/22 1400          Gait Training Goal 1  (PT)    Activity/Assistive Device (Gait Training Goal 1, PT) gait (walking locomotion);assistive device use;walker, rolling  -AV     [place] Level (Gait Training Goal 1, PT) modified independence  -AV     Distance (Gait Training Goal 1, PT) 100  -AV     Time Frame (Gait Training Goal 1, PT) 10 days  -AV           User Key  (r) = Recorded By, (t) = Taken By, (c) = Cosigned By    Initials Name Provider Type    AV Terrence Kaye, HENRY Physical Therapist                Physical Therapy Education                 Title: PT OT SLP Therapies (In Progress)     Topic: Physical Therapy (In Progress)     Point: Mobility training (Done)     Learning Progress Summary           Patient Acceptance, E,TB, VU by AV at 8/22/2022 1426                   Point: Home exercise program (Not Started)     Learner Progress:  Not documented in this visit.          Point: Body mechanics (Done)     Learning Progress Summary           Patient Acceptance, E,TB, VU by AV at 8/22/2022 1426                   Point: Precautions (Done)     Learning Progress Summary           Patient Acceptance, E,TB, VU by AV at 8/22/2022 1426                               User Key     Initials Effective Dates Name Provider Type Discipline     06/11/21 -  Terrence Kaye, PT Physical Therapist PT              PT Recommendation and Plan  Anticipated Discharge Disposition (PT): sub acute care setting, inpatient rehabilitation facility  Planned Therapy Interventions (PT): balance training, bed mobility training, gait training, neuromuscular re-education, strengthening, transfer training  Therapy Frequency (PT): daily  Plan of Care Reviewed With: patient  Progress: no change  Outcome Evaluation: Patient presents with deficits in balance, endurance, transfers, and ambulation. Patient will benefit from skilled PT services to address these mobility deficits and decrease risk of falls. Recommend rehab placement following hospital discharge to maximize independence.    Outcome Measures     Row Name 08/22/22 1400             How much help from another person do you currently need...    Turning from your back to your side while in flat bed without using bedrails? 4  -AV      Moving from lying on back to sitting on the side of a flat bed without bedrails? 3  -AV      Moving to and from a bed to a chair (including a wheelchair)? 3  -AV      Standing up from a chair using your arms (e.g., wheelchair, bedside chair)? 3  -AV      Climbing 3-5 steps with a railing? 3  -AV      To walk in hospital room? 3  -AV      AM-PAC 6 Clicks Score (PT) 19  -AV              Functional Assessment    Outcome Measure Options AM-PAC 6 Clicks Basic Mobility (PT)  -AV            User Key  (r) = Recorded By, (t) = Taken By, (c) = Cosigned By    Initials Name Provider Type    AV Terrence Kaye, PT Physical Therapist                 Time Calculation:    PT Charges     Row Name 08/22/22 1425             Time Calculation    PT Received On 08/22/22  -AV      PT Goal Re-Cert Due Date 08/31/22  -AV              Untimed Charges    PT Eval/Re-eval Minutes 32  -AV              Total Minutes    Untimed Charges Total Minutes 32  -AV       Total Minutes 32  -AV            User Key  (r) = Recorded By, (t) = Taken By, (c) = Cosigned By    Initials Name Provider Type    Terrence Ash, HENRY Physical Therapist              Therapy Charges for Today     Code Description Service Date Service Provider Modifiers Qty    51042801071 HC PT EVAL LOW COMPLEXITY 3 8/22/2022 Terrence Kaye, PT GP 1          PT G-Codes  Outcome Measure Options: AM-PAC 6 Clicks Basic Mobility (PT)  AM-PAC 6 Clicks Score (PT): 19  AM-PAC 6 Clicks Score (OT): 21    Terrence Kaye PT  8/22/2022

## 2022-08-22 NOTE — PLAN OF CARE
Goal Outcome Evaluation:   VSS. No changes. Patient up ad neftali. 24hr urine completed. Will continue to monitor.

## 2022-08-22 NOTE — PROGRESS NOTES
[facility]     Progress Note    Patient Name: Camilla Marcos  : 1945  MRN: [id number]  Primary Care Physician:  Homero Ro MD  Date of admission: 2022    Subjective   Subjective     Chief Complaint: Stable and doing much better we will try to get her to ambulate today Dr. Ed Sparks trying to adjust the medication before discharge continue current management    HPI:  Patient reports we will continue the current management of CHF we will switch to oral antibiotic when okay with Dr. Ed Sparks    Review of Systems   All systems were reviewed and negative except for: Reviewed    Objective   Objective     Vitals:   Temp:  [97.7 °F (36.5 °C)-98 °F (36.7 °C)] 97.7 °F (36.5 °C)  Heart Rate:  [] 92  Resp:  [15-20] 15  BP: ()/(46-74) 112/74    Physical Exam    Constitutional: Awake, alert   Eyes: PERRLA, sclerae anicteric, no conjunctival injection   HENT: NCAT, mucous membranes moist   Neck: Supple, no thyromegaly, no lymphadenopathy, trachea midline   Respiratory: Clear to auscultation bilaterally, nonlabored respirations    Cardiovascular: RRR, no murmurs, rubs, or gallops, palpable pedal pulses bilaterally   Gastrointestinal: Positive bowel sounds, soft, nontender, nondistended   Musculoskeletal: No bilateral ankle edema, no clubbing or cyanosis to extremities   Psychiatric: Appropriate affect, cooperative   Neurologic: Oriented x 3, strength symmetric in all extremities, Cranial Nerves grossly intact to confrontation, speech clear   Skin: No rashes   No change  Result Review    Result Review:  I have personally reviewed the results from the time of this admission to 2022 08:15 EDT and agree with these findings:  []  Laboratory  []  Microbiology  []  Radiology  []  EKG/Telemetry   [x]  Cardiology/Vascular CHF with atrial fibrillation  []  Pathology  []  Old records  []  Other:  Most notable findings include: HF with atrial fibrillation    Assessment & Plan   Assessment / Plan      Brief Patient Summary:  Camilla Marcos is a 77 y.o. female who patient admitted with congestive heart failure supination shortness of air which is markedly improved    Active Hospital Problems:  Active Hospital Problems    Diagnosis    • Atrial fibrillation with RVR (HCC)        Plan:   Continue current management we will switch to oral medications when okay with Dr. Gayle    DVT prophylaxis:  Medical and mechanical DVT prophylaxis orders are present.    CODE STATUS:   Level Of Support Discussed With: Patient  Code Status (Patient has no pulse and is not breathing): CPR (Attempt to Resuscitate)  Medical Interventions (Patient has pulse or is breathing): Full Support    Disposition:  I expect patient to be discharged as per cardiology.    Electronically signed by Homero Ro MD, 08/22/22, 8:15 AM EDT.

## 2022-08-22 NOTE — PROGRESS NOTES
Date of service 8/22/2022     Subjective: No chest pain, SOB, lightheadedness or palpitations     Review of systems: Fatigue and tired.  Does not feel good today.  Cannot explain more than that.  No headaches, vertigo, fever, chills, nausea, vomiting, abdominal pain, GI or  bleed.  No TIA or CVA-like symptoms     Physical examinations: She was in no pain or respiratory distress.  Vital signs: Reviewed  HEENT: Sclera nonicteric   Neck: No JVD or carotid bruit. Carotid upstroke is normal  Chest: CTA.   Cardiac: Irregularly irregular.  No S3 gallop.  Extremities:  No edema, cyanosis or clubbing.  Pulses intact.  Neuro: Alert, oriented x3 no facial droop and speech was clear.     Records: Reviewed     Assessment and plan:     1.  Persistent atrial fibrillation with intermittent RVR  2.  Acute on chronic diastolic CHF  3.  Chest pain, mostly due to #1  4.  Anxiety-depression  5.  Hypertension with intermittent low blood pressure, improving on midodrine.  6.  Iron deficiency anemia.  Will give iron transfusion.  7.  Change Cardizem to 60 mg p.o. 3 times daily  8.  Continue metoprolol succinate 100 mg p.o. twice daily.  9.  Anxiety.  We will give Ativan 0.5 mg p.o. twice daily.  This may help controlling her rapid ventricular rate.

## 2022-08-22 NOTE — THERAPY EVALUATION
Patient Name: Camilla Macros  : 1945    MRN: [id number]                              Today's Date: 2022       Admit Date: 2022    Visit Dx:     ICD-10-CM ICD-9-CM   1. Atrial fibrillation with RVR (HCC)  I48.91 427.31   2. Abdominal pain, acute, epigastric  R10.13 789.06     338.19   3. Dyspnea on minimal exertion  R06.00 786.09   4. Acute on chronic congestive heart failure, unspecified heart failure type (HCC)  I50.9 428.0   5. Acute anemia  D64.9 285.9   6. 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)   • History of breast cancer   • Hyperlipidemia   • Hypertension   • Goiter   • Hypothyroidism, unspecified   • IBS (irritable bowel syndrome)   • Mediastinal mass   • Osteoporosis   • Varicose veins of other specified sites   • Dysphagia   • History of esophagectomy   • Esophageal dysphagia   • Gastric leak   • PAF with RVR (paroxysmal atrial fibrillation) (CMS/HCC)   • Gastric fistula   • Gastro-esophageal reflux disease without esophagitis   • Nephrolithiasis   • Neck mass   • Adrenal mass (HCC)   • Atrial fibrillation with RVR (HCC)     Past Medical History:   Diagnosis Date   • A-fib (HCC)     dr. Chon Pope   • Anxiety    • Arthritis    • Breast cancer (HCC)    • COVID-19 virus infection 2021   • Delayed emergence from anesthesia     history   • Depression     situational  gets upset when in hospital    • Diabetes (HCC)     diet controlled  has weight loss   • Dysphagia    • Esophageal cancer (HCC) 2019   • GERD (gastroesophageal reflux disease)    • History of mononucleosis    • History of transfusion     no reaction   • HX: breast cancer 2001    right   • Hypertension    • Osteoporosis    • Thyroid disorder     hypothyroid   • Thyroid nodule      Past Surgical  History:   Procedure Laterality Date   • BREAST BIOPSY Right 2001   • BREAST BIOPSY Left 1990   • BREAST LUMPECTOMY Right    • COLONOSCOPY     • CYSTOSCOPY URETEROSCOPY Left 4/27/2022    Procedure: CYSTOSCOPY, LEFT URETEROSCOPY, LASERTRIPSY, STENT INSERTION;  Surgeon: Jason Grajeda MD;  Location: [facility] OR;  Service: Urology;  Laterality: Left;   • ENDOSCOPY  02/06/2019   • ENDOSCOPY  08/06/2018   • ENDOSCOPY N/A 09/16/2019    Procedure: ESOPHAGOGASTRODUODENOSCOPY with DILATATION (12-15mm balloon);  Surgeon: Aldair Booker MD;  Location: [facility] ENDOSCOPY;  Service: Gastroenterology   • ENDOSCOPY N/A 12/16/2019    Procedure: ESOPHAGOGASTRODUODENOSCOPY with balloon dilitation 15-18mm) up to 16mm;  Surgeon: Aldair Booker MD;  Location: [facility] ENDOSCOPY;  Service: Gastroenterology   • ENDOSCOPY N/A 07/24/2020    Procedure: ESOPHAGOGASTRODUODENOSCOPY with balloon dilation(12mm-15mm up to 14.5mm) of esophageal anastomosis stricture;  Surgeon: Aldair Booker MD;  Location: [facility] ENDOSCOPY;  Service: Gastroenterology;  Laterality: N/A;  esophageal stricture   • ENDOSCOPY N/A 05/13/2021    Procedure: ESOPHAGOGASTRODUODENOSCOPY WITH BALLOON DILATION UP TO 19MM;  Surgeon: Lily Bettencourt MD;  Location: [facility] ENDOSCOPY;  Service: Gastroenterology;  Laterality: N/A;  ANASTAMOTIC STRICTURE   • ENDOSCOPY N/A 06/24/2021    Procedure: ESOPHAGOGASTRODUODENOSCOPY WITH  DILATATION WITH BALLOON (18-20MM, UP TO 20MM);  Surgeon: Lily Bettencourt MD;  Location: [facility] ENDOSCOPY;  Service: Gastroenterology;  Laterality: N/A;  ESOPHAGOGASTRO ANASTOMOTIC STRICTURE   • ENDOSCOPY N/A 07/22/2021    Procedure: ESOPHAGOGASTRODUodenoscopy;  Surgeon: Lily Bettencourt MD;  Location: [facility] ENDOSCOPY;  Service: Gastroenterology;  Laterality: N/A;  esophageal pleural fistula   • ENDOSCOPY N/A 07/23/2021    Procedure: ESOPHAGOGASTRODUODENOSCOPY with stent placement;  Surgeon: Lily Bettencourt MD;  Location: [facility];   Service: Gastroenterology;  Laterality: N/A;   • ENDOSCOPY N/A 12/01/2021    Procedure: ESOPHAGOGASTRODUODENOSCOPY WITH STENT REMOVAL;  Surgeon: Lily Bettencourt MD;  Location: [facility] MAIN OR;  Service: Gastroenterology;  Laterality: N/A;   • ENDOSCOPY W/ STENT PLACEMENT/REMOVAL N/A 10/06/2021    Procedure: ESOPHAGOGASTRODUODENOSCOPY WITH STENT removal and possible replacement;  Surgeon: Lily Bettencourt MD;  Location: [facility] MAIN OR;  Service: Gastroenterology;  Laterality: N/A;   • ESOPHAGOGASTRECTOMY  03/04/2019    [facility]   • ESOPHAGUS SURGERY      had a stent placed for hole in esophagus   • HEAD/NECK ABSCESS INCISION AND DRAINAGE N/A 6/28/2022    Procedure: Excision of a superficial neck mass;  Surgeon: Lily Bettencourt MD;  Location: [facility] MAIN OR;  Service: Cardiothoracic;  Laterality: N/A;   • JEJUNOSTOMY N/A 07/26/2021    Procedure: OPEN JEJUNOSTOMY TUBE;  Surgeon: Bulmaro Coreas Jr., MD;  Location: [facility];  Service: General;  Laterality: N/A;   • LAPAROSCOPIC TUBAL LIGATION     • THORACOTOMY Right 08/17/2018    right vats biopsy of mediastinal mass, right thoracotomy      General Information     Row Name 08/22/22 7819          OT Time and Intention    Document Type evaluation  -ES     Mode of Treatment individual therapy;occupational therapy  -ES     Row Name 08/22/22 2773          General Information    Patient Profile Reviewed yes  -ES     Prior Level of Function independent:;ADL's;all household mobility;community mobility  Patient reports independent with B and IADLs at baseline. No device for functional mobility, reports RWX if needed. Tub/shower combo with grab bars: standing shower completion. No home O2 use.  -ES     Existing Precautions/Restrictions fall  -ES     Barriers to Rehab none identified  -ES     Row Name 08/22/22 7523          Occupational Profile    Reason for Services/Referral (Occupational Profile) Patient is 77 yr old female admitted to [facility] on 8/20/2022  with reports of SOA and irregular heart beat. Patient admitted with Afib RVR. OT evaluation and treatment ordered d/t recent decline in ADLs/transfer ability. No previous OT services for current condition.  -ES     Row Name 08/22/22 1338          Living Environment    People in Home alone  -ES     Row Name 08/22/22 1338          Home Main Entrance    Number of Stairs, Main Entrance three  -ES     Stair Railings, Main Entrance railings safe and in good condition  -ES     Row Name 08/22/22 1338          Stairs Within Home, Primary    Stair Railings, Within Home, Primary none  -ES     Row Name 08/22/22 1338          Cognition    Orientation Status (Cognition) oriented x 3  Patient cooperative, agreeable to therapy evaluation. Patient presents with limited insight to deficits and need for assist at time of evaluation.  -ES     Row Name 08/22/22 1338          Safety Issues, Functional Mobility    Safety Issues Affecting Function (Mobility) awareness of need for assistance;insight into deficits/self-awareness  -ES     Impairments Affecting Function (Mobility) balance;endurance/activity tolerance;shortness of breath  -ES           User Key  (r) = Recorded By, (t) = Taken By, (c) = Cosigned By    Initials Name Provider Type    ES Jeana Coleman, OT Occupational Therapist                 Mobility/ADL's     Row Name 08/22/22 1344          Bed Mobility    Bed Mobility supine-sit;sit-supine  -ES     Supine-Sit Kosse (Bed Mobility) contact guard  -ES     Sit-Supine Kosse (Bed Mobility) contact guard  -ES     Row Name 08/22/22 1344          Transfers    Transfers sit-stand transfer;stand-sit transfer  -ES     Comment, (Transfers) patient declines use of AD. HHA x1 with all tsfs  -ES     Sit-Stand Kosse (Transfers) contact guard;1 person assist  -ES     Stand-Sit Kosse (Transfers) contact guard;1 person assist  -ES     Row Name 08/22/22 1344          Functional Mobility    Functional Mobility- Ind. Level  contact guard assist;minimum assist (75% patient effort);1 person  -ES     Functional Mobility- Device other (see comments)  HHA x1 as patient declines use of AD  -ES     Functional Mobility- Comment Patient performs functional mobility from edge of bed to sinkside and returns to edge of bed. Patient demonstrates losses of balance with mobility, requiring min A to correct. Patient furniture walks, refuses use of assistive device.  -ES     Row Name 08/22/22 1344          Activities of Daily Living    BADL Assessment/Intervention bathing;upper body dressing;lower body dressing;grooming;feeding;toileting  -ES     Row Name 08/22/22 1344          Bathing Assessment/Intervention    Richardson Level (Bathing) bathing skills;minimum assist (75% patient effort)  -ES     Row Name 08/22/22 1344          Upper Body Dressing Assessment/Training    Richardson Level (Upper Body Dressing) upper body dressing skills;set up  -ES     Row Name 08/22/22 1344          Lower Body Dressing Assessment/Training    Richardson Level (Lower Body Dressing) lower body dressing skills;minimum assist (75% patient effort)  -ES     Row Name 08/22/22 1344          Grooming Assessment/Training    Richardson Level (Grooming) grooming skills;contact guard assist;set up  -ES     Position (Grooming) sink side;supported standing  -ES     Row Name 08/22/22 1344          Self-Feeding Assessment/Training    Richardson Level (Feeding) feeding skills;set up  -ES     Row Name 08/22/22 1344          Toileting Assessment/Training    Richardson Level (Toileting) toileting skills;contact guard assist  -ES           User Key  (r) = Recorded By, (t) = Taken By, (c) = Cosigned By    Initials Name Provider Type    Jeana López OT Occupational Therapist               Obj/Interventions     Row Name 08/22/22 1347          Sensory Assessment (Somatosensory)    Sensory Assessment (Somatosensory) bilateral UE  -ES     Bilateral UE Sensory Assessment general  sensation;intact  -ES     Row Name 08/22/22 1349          Vision Assessment/Intervention    Visual Impairment/Limitations WFL  -ES     Row Name 08/22/22 1349          Range of Motion Comprehensive    General Range of Motion no range of motion deficits identified;bilateral upper extremity ROM WNL  -ES     Row Name 08/22/22 1349          Strength Comprehensive (MMT)    General Manual Muscle Testing (MMT) Assessment upper extremity strength deficits identified  -ES     Comment, General Manual Muscle Testing (MMT) Assessment bilateral upper extremities assessed 4-/5 with strength deficits noted at time of assessment  -ES     Row Name 08/22/22 1349          Motor Skills    Motor Skills functional endurance;coordination  -ES     Coordination WFL;upper extremity  -ES     Functional Endurance fair minus. Patient demonstrates SOA with mobility to/from sink, requesting seated rest break at sink and labored breathing noted at return to edge of bed.  -ES     Row Name 08/22/22 1349          Balance    Balance Assessment sitting dynamic balance;standing dynamic balance  -ES     Dynamic Sitting Balance independent  -ES     Position, Sitting Balance unsupported;sitting edge of bed  -ES     Dynamic Standing Balance contact guard;minimal assist;1-person assist  -ES     Position/Device Used, Standing Balance other (see comments);supported  HHA x1  -ES     Comment, Balance patient with decreased dynamic standing balance with mobility and transfers. Patient declines use of AD.  -ES           User Key  (r) = Recorded By, (t) = Taken By, (c) = Cosigned By    Initials Name Provider Type    Jeana López OT Occupational Therapist               Goals/Plan     Row Name 08/22/22 5087          Transfer Goal 1 (OT)    Activity/Assistive Device (Transfer Goal 1, OT) transfers, all  -ES     Webb Level/Cues Needed (Transfer Goal 1, OT) modified independence  -ES     Time Frame (Transfer Goal 1, OT) long term goal (LTG);10 days  -ES      Row Name 08/22/22 [facility]          Bathing Goal 1 (OT)    Activity/Device (Bathing Goal 1, OT) bathing skills, all  -ES     [place] Level/Cues Needed (Bathing Goal 1, OT) independent  -ES     Time Frame (Bathing Goal 1, OT) long term goal (LTG);10 days  -ES     Row Name 08/22/22 1352          Dressing Goal 1 (OT)    Activity/Device (Dressing Goal 1, OT) dressing skills, all  -ES     [place]/Cues Needed (Dressing Goal 1, OT) independent  -ES     Time Frame (Dressing Goal 1, OT) long term goal (LTG);10 days  -ES     Row Name 08/22/22 [facility]          Toileting Goal 1 (OT)    Activity/Device (Toileting Goal 1, OT) toileting skills, all  -ES     [place] Level/Cues Needed (Toileting Goal 1, OT) independent  -ES     Time Frame (Toileting Goal 1, OT) long term goal (LTG);10 days  -ES     Row Name 08/22/22 [facility]          Grooming Goal 1 (OT)    Activity/Device (Grooming Goal 1, OT) grooming skills, all  -ES     [place] (Grooming Goal 1, OT) independent  -ES     Time Frame (Grooming Goal 1, OT) long term goal (LTG);10 days  -ES     Row Name 08/22/22 1352          Strength Goal 1 (OT)    Strength Goal 1 (OT) Pt will increase BUE strength 1 muscle grade w/ HEP provided handouts and demo for increased UE strength required for ADL transfers and task completion  -ES     Time Frame (Strength Goal 1, OT) long term goal (LTG);10 days  -ES     [facility] Name 08/22/22 [facility]          Therapy Assessment/Plan (OT)    Planned Therapy Interventions (OT) activity tolerance training;BADL retraining;functional balance retraining;occupation/activity based interventions;patient/caregiver education/training;strengthening exercise;transfer/mobility retraining  -ES           User Key  (r) = Recorded By, (t) = Taken By, (c) = Cosigned By    Initials Name Provider Type    Jeana López, OT Occupational Therapist               Clinical Impression     Row Name 08/22/22 1351          Plan of Care Review    Plan of Care Reviewed With patient   -ES     Progress no change  initial evaluation encounter  -ES     Outcome Evaluation Patient has experienced decline in function from baseline status, presenting w/ deficits related to activity tolerance, functional balance, safety awareness, strength, transfers and mobility that impede patient independence with activities of daily living.  Patient would benefit from skilled Occupational Therapy intervention to maxamize patient safety, and promote return to baseline independence. Patient would benefit from inpatient rehabilitation placement at discharge from acute care setting.  -ES     Row Name 08/22/22 1352          Therapy Assessment/Plan (OT)    Rehab Potential (OT) good, to achieve stated therapy goals  -ES     Criteria for Skilled Therapeutic Interventions Met (OT) yes;meets criteria;skilled treatment is necessary  -ES     Therapy Frequency (OT) 5 times/wk  -ES     Row Name 08/22/22 1351          Therapy Plan Review/Discharge Plan (OT)    Equipment Needs Upon Discharge (OT) walker, rolling  -SARAHI     Anticipated Discharge Disposition (OT) inpatient rehabilitation facility;skilled nursing facility  -ES     Row Name 08/22/22 1359          Vital Signs    O2 Delivery Pre Treatment room air  -ES     O2 Delivery Intra Treatment room air  -ES     O2 Delivery Post Treatment room air  -ES           User Key  (r) = Recorded By, (t) = Taken By, (c) = Cosigned By    Initials Name Provider Type    ES Jeana Coleman, ARON Occupational Therapist               Outcome Measures     Row Name 08/22/22 8985          How much help from another is currently needed...    Putting on and taking off regular lower body clothing? 3  -ES     Bathing (including washing, rinsing, and drying) 3  -ES     Toileting (which includes using toilet bed pan or urinal) 3  -ES     Putting on and taking off regular upper body clothing 4  -ES     Taking care of personal grooming (such as brushing teeth) 4  -ES     Eating meals 4  -ES     AM-PAC 6 Clicks  Score (OT) 21  -ES     Row Name 08/22/22 0805          How much help from another person do you currently need...    Turning from your back to your side while in flat bed without using bedrails? 4  -MM     Moving from lying on back to sitting on the side of a flat bed without bedrails? 3  -MM     Moving to and from a bed to a chair (including a wheelchair)? 3  -MM     Standing up from a chair using your arms (e.g., wheelchair, bedside chair)? 3  -MM     Climbing 3-5 steps with a railing? 3  -MM     To walk in hospital room? 3  -MM     AM-PAC 6 Clicks Score (PT) 19  -MM     Row Name 08/22/22 1354          Functional Assessment    Outcome Measure Options AM-PAC 6 Clicks Daily Activity (OT);Optimal Instrument  -ES     Row Name 08/22/22 1354          Optimal Instrument    Optimal Instrument Optimal - 3  -ES     Bending/Stooping 2  -ES     Standing 2  -ES     Reaching 1  -ES     From the list, choose the 3 activities you would most like to be able to do without any difficulty Bending/stooping;Standing;Reaching  -ES     Total Score Optimal - 3 5  -ES           User Key  (r) = Recorded By, (t) = Taken By, (c) = Cosigned By    Initials Name Provider Type    Nik Hatch, RN Registered Nurse    Jeana López OT Occupational Therapist                Occupational Therapy Education                 Title: PT OT SLP Therapies (Done)     Topic: Occupational Therapy (Done)     Point: ADL training (Done)     Description:   Instruct learner(s) on proper safety adaptation and remediation techniques during self care or transfers.   Instruct in proper use of assistive devices.              Learning Progress Summary           Patient Acceptance, E,TB, VU by SARAHI at 8/22/2022 1058                   Point: Home exercise program (Done)     Description:   Instruct learner(s) on appropriate technique for monitoring, assisting and/or progressing therapeutic exercises/activities.              Learning Progress Summary           Patient  Acceptance, E,TB, VU by  at 8/22/2022 1359                   Point: Precautions (Done)     Description:   Instruct learner(s) on prescribed precautions during self-care and functional transfers.              Learning Progress Summary           Patient Acceptance, E,TB, VU by  at 8/22/2022 1359                   Point: Body mechanics (Done)     Description:   Instruct learner(s) on proper positioning and spine alignment during self-care, functional mobility activities and/or exercises.              Learning Progress Summary           Patient Acceptance, E,TB, VU by  at 8/22/2022 1359                               User Key     Initials Effective Dates Name Provider Type Discipline     09/13/21 -  Jeana Coleman, OT Occupational Therapist OT              OT Recommendation and Plan  Planned Therapy Interventions (OT): activity tolerance training, BADL retraining, functional balance retraining, occupation/activity based interventions, patient/caregiver education/training, strengthening exercise, transfer/mobility retraining  Therapy Frequency (OT): 5 times/wk  Plan of Care Review  Plan of Care Reviewed With: patient  Progress: no change (initial evaluation encounter)  Outcome Evaluation: Patient has experienced decline in function from baseline status, presenting w/ deficits related to activity tolerance, functional balance, safety awareness, strength, transfers and mobility that impede patient independence with activities of daily living.  Patient would benefit from skilled Occupational Therapy intervention to maxamize patient safety, and promote return to baseline independence. Patient would benefit from inpatient rehabilitation placement at discharge from acute care setting.     Time Calculation:    Time Calculation- OT     Row Name 08/22/22 1400             Time Calculation- OT    OT Received On 08/22/22  -      OT Goal Re-Cert Due Date 08/31/22  -ES              Untimed Charges    OT Eval/Re-eval Minutes 32   -ES              Total Minutes    Untimed Charges Total Minutes 32  -ES       Total Minutes 32  -ES            User Key  (r) = Recorded By, (t) = Taken By, (c) = Cosigned By    Initials Name Provider Type    Jeana López OT Occupational Therapist              Therapy Charges for Today     Code Description Service Date Service Provider Modifiers Qty    [id number]  OT EVAL LOW COMPLEXITY 3 8/22/2022 Jeana Coleman OT GO 1               Jeana Coleman OT  8/22/2022

## 2022-08-22 NOTE — PLAN OF CARE
Goal Outcome Evaluation:              Pt stable with no c/o pain and no s/s of distress. Pt continues on 24hr urine.

## 2022-08-22 NOTE — PLAN OF CARE
Goal Outcome Evaluation:  Plan of Care Reviewed With: patient        Progress: no change (initial evaluation encounter)  Outcome Evaluation: Patient has experienced decline in function from baseline status, presenting w/ deficits related to activity tolerance, functional balance, safety awareness, strength, transfers and mobility that impede patient independence with activities of daily living.  Patient would benefit from skilled Occupational Therapy intervention to maxamize patient safety, and promote return to baseline independence. Patient would benefit from inpatient rehabilitation placement at discharge from acute care setting.

## 2022-08-22 NOTE — PLAN OF CARE
Goal Outcome Evaluation:  Plan of Care Reviewed With: patient        Progress: no change  Outcome Evaluation: Patient presents with deficits in balance, endurance, transfers, and ambulation. Patient will benefit from skilled PT services to address these mobility deficits and decrease risk of falls. Recommend rehab placement following hospital discharge to maximize independence.

## 2022-08-23 LAB
ANION GAP SERPL CALCULATED.3IONS-SCNC: 14.6 MMOL/L (ref 5–15)
BUN SERPL-MCNC: 25 MG/DL (ref 8–23)
BUN/CREAT SERPL: 30.5 (ref 7–25)
CALCIUM SPEC-SCNC: 8.8 MG/DL (ref 8.6–10.5)
CHLORIDE SERPL-SCNC: 102 MMOL/L (ref 98–107)
CO2 SERPL-SCNC: 24.4 MMOL/L (ref 22–29)
CREAT SERPL-MCNC: 0.82 MG/DL (ref 0.57–1)
EGFRCR SERPLBLD CKD-EPI 2021: 73.8 ML/MIN/1.73
GLUCOSE BLDC GLUCOMTR-MCNC: 120 MG/DL (ref 70–99)
GLUCOSE BLDC GLUCOMTR-MCNC: 132 MG/DL (ref 70–99)
GLUCOSE BLDC GLUCOMTR-MCNC: 324 MG/DL (ref 70–99)
GLUCOSE SERPL-MCNC: 167 MG/DL (ref 65–99)
MAGNESIUM SERPL-MCNC: 1.7 MG/DL (ref 1.6–2.4)
POTASSIUM SERPL-SCNC: 3 MMOL/L (ref 3.5–5.2)
SODIUM SERPL-SCNC: 141 MMOL/L (ref 136–145)
T-UPTAKE NFR SERPL: 0.92 TBI (ref 0.8–1.3)
T4 SERPL-MCNC: 9.3 MCG/DL (ref 4.5–11.7)
TSH SERPL DL<=0.05 MIU/L-ACNC: 0.57 UIU/ML (ref 0.27–4.2)
WHOLE BLOOD HOLD SPECIMEN: NORMAL

## 2022-08-23 PROCEDURE — 25010000002 FUROSEMIDE PER 20 MG: Performed by: INTERNAL MEDICINE

## 2022-08-23 PROCEDURE — 80048 BASIC METABOLIC PNL TOTAL CA: CPT | Performed by: INTERNAL MEDICINE

## 2022-08-23 PROCEDURE — 94799 UNLISTED PULMONARY SVC/PX: CPT

## 2022-08-23 PROCEDURE — 83735 ASSAY OF MAGNESIUM: CPT | Performed by: INTERNAL MEDICINE

## 2022-08-23 PROCEDURE — 84479 ASSAY OF THYROID (T3 OR T4): CPT | Performed by: INTERNAL MEDICINE

## 2022-08-23 PROCEDURE — 82962 GLUCOSE BLOOD TEST: CPT

## 2022-08-23 PROCEDURE — 84436 ASSAY OF TOTAL THYROXINE: CPT | Performed by: INTERNAL MEDICINE

## 2022-08-23 PROCEDURE — 25010000002 NA FERRIC GLUC CPLX PER 12.5 MG: Performed by: SPECIALIST

## 2022-08-23 PROCEDURE — 0 POTASSIUM CHLORIDE 10 MEQ/100ML SOLUTION: Performed by: INTERNAL MEDICINE

## 2022-08-23 PROCEDURE — 84443 ASSAY THYROID STIM HORMONE: CPT | Performed by: INTERNAL MEDICINE

## 2022-08-23 PROCEDURE — 63710000001 INSULIN LISPRO (HUMAN) PER 5 UNITS: Performed by: INTERNAL MEDICINE

## 2022-08-23 RX ORDER — DILTIAZEM HYDROCHLORIDE 120 MG/1
120 CAPSULE, COATED, EXTENDED RELEASE ORAL
Status: DISCONTINUED | OUTPATIENT
Start: 2022-08-23 | End: 2022-08-24 | Stop reason: HOSPADM

## 2022-08-23 RX ORDER — POTASSIUM CHLORIDE 750 MG/1
20 CAPSULE, EXTENDED RELEASE ORAL 2 TIMES DAILY WITH MEALS
Status: DISCONTINUED | OUTPATIENT
Start: 2022-08-23 | End: 2022-08-23

## 2022-08-23 RX ORDER — POTASSIUM CHLORIDE 1.5 G/1.77G
20 POWDER, FOR SOLUTION ORAL 2 TIMES DAILY WITH MEALS
Status: DISCONTINUED | OUTPATIENT
Start: 2022-08-23 | End: 2022-08-24 | Stop reason: HOSPADM

## 2022-08-23 RX ORDER — POTASSIUM CHLORIDE 7.45 MG/ML
10 INJECTION INTRAVENOUS EVERY 4 HOURS
Status: COMPLETED | OUTPATIENT
Start: 2022-08-23 | End: 2022-08-23

## 2022-08-23 RX ORDER — MIDODRINE HYDROCHLORIDE 2.5 MG/1
5 TABLET ORAL
Status: DISCONTINUED | OUTPATIENT
Start: 2022-08-23 | End: 2022-08-24 | Stop reason: HOSPADM

## 2022-08-23 RX ADMIN — ACETAMINOPHEN 650 MG: 325 TABLET ORAL at 20:00

## 2022-08-23 RX ADMIN — METOPROLOL SUCCINATE 100 MG: 100 TABLET, EXTENDED RELEASE ORAL at 20:00

## 2022-08-23 RX ADMIN — MIDODRINE HYDROCHLORIDE 2.5 MG: 2.5 TABLET ORAL at 08:01

## 2022-08-23 RX ADMIN — DILTIAZEM HYDROCHLORIDE 120 MG: 120 CAPSULE, COATED, EXTENDED RELEASE ORAL at 20:01

## 2022-08-23 RX ADMIN — METOPROLOL SUCCINATE 100 MG: 100 TABLET, EXTENDED RELEASE ORAL at 09:24

## 2022-08-23 RX ADMIN — MIDODRINE HYDROCHLORIDE 5 MG: 2.5 TABLET ORAL at 11:26

## 2022-08-23 RX ADMIN — FUROSEMIDE 40 MG: 10 INJECTION, SOLUTION INTRAMUSCULAR; INTRAVENOUS at 09:24

## 2022-08-23 RX ADMIN — DILTIAZEM HYDROCHLORIDE 60 MG: 60 TABLET, FILM COATED ORAL at 17:06

## 2022-08-23 RX ADMIN — POTASSIUM CHLORIDE 20 MEQ: 1.5 POWDER, FOR SOLUTION ORAL at 09:24

## 2022-08-23 RX ADMIN — MIDODRINE HYDROCHLORIDE 5 MG: 2.5 TABLET ORAL at 17:07

## 2022-08-23 RX ADMIN — SODIUM CHLORIDE 250 MG: 9 INJECTION, SOLUTION INTRAVENOUS at 10:04

## 2022-08-23 RX ADMIN — CETIRIZINE HYDROCHLORIDE TABLETS 10 MG: 10 TABLET, FILM COATED ORAL at 09:24

## 2022-08-23 RX ADMIN — INSULIN LISPRO 5 UNITS: 100 INJECTION, SOLUTION INTRAVENOUS; SUBCUTANEOUS at 17:07

## 2022-08-23 RX ADMIN — DILTIAZEM HYDROCHLORIDE 60 MG: 60 TABLET, FILM COATED ORAL at 09:24

## 2022-08-23 RX ADMIN — FUROSEMIDE 40 MG: 10 INJECTION, SOLUTION INTRAMUSCULAR; INTRAVENOUS at 17:06

## 2022-08-23 RX ADMIN — Medication 10 ML: at 08:02

## 2022-08-23 RX ADMIN — APIXABAN 5 MG: 5 TABLET, FILM COATED ORAL at 20:00

## 2022-08-23 RX ADMIN — LEVOTHYROXINE SODIUM 25 MCG: 0.03 TABLET ORAL at 08:01

## 2022-08-23 RX ADMIN — POTASSIUM CHLORIDE 20 MEQ: 1.5 POWDER, FOR SOLUTION ORAL at 17:06

## 2022-08-23 RX ADMIN — POTASSIUM CHLORIDE 10 MEQ: 7.46 INJECTION, SOLUTION INTRAVENOUS at 11:31

## 2022-08-23 RX ADMIN — POTASSIUM CHLORIDE 10 MEQ: 7.46 INJECTION, SOLUTION INTRAVENOUS at 08:01

## 2022-08-23 RX ADMIN — APIXABAN 5 MG: 5 TABLET, FILM COATED ORAL at 09:24

## 2022-08-23 NOTE — PROGRESS NOTES
Date of service: 8/23/2022    Subjective: Feels much better today.  No chest pain, SOB, lightheadedness or palpitations    Review of systems: Fatigue and tired.  Does not feel good today.  Cannot explain more than that.  No headaches, vertigo, fever, chills, nausea, vomiting, abdominal pain, GI or  bleed.  No TIA or CVA-like symptoms     Physical examinations: She was in no pain or respiratory distress.  Vital signs: Reviewed  HEENT: Sclera nonicteric   Neck: No JVD or carotid bruit.   Chest: CTA.   Cardiac: Irregularly irregular.  No murmurs or S3 gallop.  Extremities:  No edema, cyanosis or clubbing.  Pulses intact.  Neuro: Alert, oriented x3 no facial droop and speech was clear.    Records: Reviewed     Assessment and plan:     1.  Persistent atrial fibrillation with better controlled ventricular rate.  2.  Acute on chronic diastolic CHF  3.  Chest pain, mostly due to #1  4.  Anxiety-depression  5.  Hypertension with intermittent low blood pressure, improving on midodrine.  6.  Iron deficiency anemia.    Iron transfusion today and tomorrow.  Discussed with Dr. Ro.  7.  Continue Cardizem to 60 mg p.o. 3 times daily.  We will change it to Cardizem  mg p.o. tomorrow morning.  8.  Continue metoprolol succinate 100 mg p.o. twice daily.  9.  Anxiety.    She refused to take Ativan yesterday.  She thinks she does not need it.  10. She may be discharged tomorrow, if she continues to improve.

## 2022-08-23 NOTE — CASE MANAGEMENT/SOCIAL WORK
Pt alert and oriented. Pt admitted for Afib with RVR. Pt with hx of heart failure. BNP >5000 neg trop, EF 51-55% Pt sees Dr Burger as outpt, Dr Burger following.     Pt lives at home alone. Pt is independent. Pt is complaint with medications and appts. Pt currently not on lasix at home.     Heart failure handouts and education provided. Discusses causes, treatment plan, medications, low Na diet and s/s to report. Pt states she eats what she likes as she grazes throughout the day. Pt states she does limit Na intake and does drink adequate amount of fluids.     Pt asked that she make her own follow up appt after discharged.     Provided pt with HF cm contact info to call if needed after discharge.     Pt anticipates discharged tomorrow.    Will continue to follow.

## 2022-08-23 NOTE — PROGRESS NOTES
[facility]     Progress Note    Patient Name: Camilla Marcos  : 1945  MRN: [id number]  Primary Care Physician:  Homero Ro MD  Date of admission: 2022    Subjective   Subjective     Chief Complaint: Stable and doing well she still has got tachycardia we will continue current management we will replace the potassium check the magnesium level    HPI:  Patient reports been stable doing better but continues to have tachycardia thyroid functions also will be checked    Review of Systems   All systems were reviewed and negative except for: Reviewed    Objective   Objective     Vitals:   Temp:  [97.7 °F (36.5 °C)-98.8 °F (37.1 °C)] 98.6 °F (37 °C)  Heart Rate:  [] 106  Resp:  [16] 16  BP: ()/(49-78) 128/67    Physical Exam    Constitutional: Awake, alert   Eyes: PERRLA, sclerae anicteric, no conjunctival injection   HENT: NCAT, mucous membranes moist   Neck: Supple, no thyromegaly, no lymphadenopathy, trachea midline   Respiratory: Clear to auscultation bilaterally, nonlabored respirations    Cardiovascular: RRR, no murmurs, rubs, or gallops, palpable pedal pulses bilaterally   Gastrointestinal: Positive bowel sounds, soft, nontender, nondistended   Musculoskeletal: No bilateral ankle edema, no clubbing or cyanosis to extremities   Psychiatric: Appropriate affect, cooperative   Neurologic: Oriented x 3, strength symmetric in all extremities, Cranial Nerves grossly intact to confrontation, speech clear   Skin: No rashes   No change  Result Review    Result Review:  I have personally reviewed the results from the time of this admission to 2022 08:51 EDT and agree with these findings:  []  Laboratory  []  Microbiology  []  Radiology  []  EKG/Telemetry   []  Cardiology/Vascular   []  Pathology  []  Old records  []  Other:  Most notable findings include: Tachycardia with CHF    Assessment & Plan   Assessment / Plan     Brief Patient Summary:  Camilla Marcos is a 77 y.o. female who  get cardio with CHF    Active Hospital Problems:  Active Hospital Problems    Diagnosis    • Atrial fibrillation with RVR (HCC)        Plan:   Potassium supplement we will check magnesium level thyroid function    DVT prophylaxis:  Medical and mechanical DVT prophylaxis orders are present.    CODE STATUS:   Level Of Support Discussed With: Patient  Code Status (Patient has no pulse and is not breathing): CPR (Attempt to Resuscitate)  Medical Interventions (Patient has pulse or is breathing): Full Support    Disposition:  I expect patient to be discharged after the patient has been stabilized.    Electronically signed by Homero Ro MD, 08/23/22, 8:51 AM EDT.

## 2022-08-23 NOTE — PLAN OF CARE
Goal Outcome Evaluation:   VSS. HR tachy when patient up out of bed. Replaced potassium today. No other changes. Will continue to monitor.

## 2022-08-24 ENCOUNTER — READMISSION MANAGEMENT (OUTPATIENT)
Dept: CALL CENTER | Facility: HOSPITAL | Age: 77
End: 2022-08-24

## 2022-08-24 VITALS
TEMPERATURE: 97.3 F | SYSTOLIC BLOOD PRESSURE: 117 MMHG | WEIGHT: 139.33 LBS | RESPIRATION RATE: 16 BRPM | HEART RATE: 72 BPM | BODY MASS INDEX: 21.87 KG/M2 | DIASTOLIC BLOOD PRESSURE: 80 MMHG | HEIGHT: 67 IN | OXYGEN SATURATION: 99 %

## 2022-08-24 LAB
ALBUMIN 24H MFR UR ELPH: 29.5 %
ALPHA1 GLOB 24H MFR UR ELPH: 6.5 %
ALPHA2 GLOB 24H MFR UR ELPH: 19.1 %
ANION GAP SERPL CALCULATED.3IONS-SCNC: 12.7 MMOL/L (ref 5–15)
B-GLOBULIN 24H MFR UR ELPH: 25.6 %
BUN SERPL-MCNC: 24 MG/DL (ref 8–23)
BUN/CREAT SERPL: 27 (ref 7–25)
CALCIUM SPEC-SCNC: 9 MG/DL (ref 8.6–10.5)
CHLORIDE SERPL-SCNC: 98 MMOL/L (ref 98–107)
CO2 SERPL-SCNC: 23.3 MMOL/L (ref 22–29)
CREAT SERPL-MCNC: 0.89 MG/DL (ref 0.57–1)
EGFRCR SERPLBLD CKD-EPI 2021: 66.9 ML/MIN/1.73
GAMMA GLOB 24H MFR UR ELPH: 19.4 %
GLUCOSE BLDC GLUCOMTR-MCNC: 119 MG/DL (ref 70–99)
GLUCOSE SERPL-MCNC: 320 MG/DL (ref 65–99)
LABORATORY COMMENT REPORT: NORMAL
M PROTEIN 24H MFR UR ELPH: NORMAL %
M PROTEIN 24H UR ELPH-MRATE: NORMAL MG/24 HR
METHYLMALONATE SERPL-SCNC: 168 NMOL/L (ref 0–378)
POTASSIUM SERPL-SCNC: 4.4 MMOL/L (ref 3.5–5.2)
PROT 24H UR-MRATE: NORMAL MG/24 HR (ref 30–150)
PROT UR-MCNC: 5.4 MG/DL
SODIUM SERPL-SCNC: 134 MMOL/L (ref 136–145)

## 2022-08-24 PROCEDURE — 25010000002 FUROSEMIDE PER 20 MG: Performed by: INTERNAL MEDICINE

## 2022-08-24 PROCEDURE — 80048 BASIC METABOLIC PNL TOTAL CA: CPT | Performed by: INTERNAL MEDICINE

## 2022-08-24 PROCEDURE — 82962 GLUCOSE BLOOD TEST: CPT

## 2022-08-24 RX ORDER — FUROSEMIDE 40 MG/1
40 TABLET ORAL
Status: SHIPPED | OUTPATIENT
Start: 2022-08-24 | End: 2022-08-27

## 2022-08-24 RX ORDER — MIDODRINE HYDROCHLORIDE 5 MG/1
5 TABLET ORAL
Qty: 30 TABLET | Refills: 3 | Status: SHIPPED | OUTPATIENT
Start: 2022-08-24

## 2022-08-24 RX ORDER — POTASSIUM CHLORIDE 1.5 G/1.77G
20 POWDER, FOR SOLUTION ORAL 2 TIMES DAILY
Qty: 60 PACKET | Refills: 3 | Status: SHIPPED | OUTPATIENT
Start: 2022-08-24 | End: 2022-09-23

## 2022-08-24 RX ORDER — DILTIAZEM HYDROCHLORIDE 120 MG/1
120 CAPSULE, COATED, EXTENDED RELEASE ORAL DAILY
Qty: 30 CAPSULE | Refills: 3 | Status: SHIPPED | OUTPATIENT
Start: 2022-08-24 | End: 2023-02-14

## 2022-08-24 RX ADMIN — FUROSEMIDE 40 MG: 10 INJECTION, SOLUTION INTRAMUSCULAR; INTRAVENOUS at 09:04

## 2022-08-24 RX ADMIN — POTASSIUM CHLORIDE 20 MEQ: 1.5 POWDER, FOR SOLUTION ORAL at 08:18

## 2022-08-24 RX ADMIN — LEVOTHYROXINE SODIUM 25 MCG: 0.03 TABLET ORAL at 08:18

## 2022-08-24 RX ADMIN — APIXABAN 5 MG: 5 TABLET, FILM COATED ORAL at 09:04

## 2022-08-24 RX ADMIN — DILTIAZEM HYDROCHLORIDE 120 MG: 120 CAPSULE, COATED, EXTENDED RELEASE ORAL at 09:04

## 2022-08-24 RX ADMIN — CETIRIZINE HYDROCHLORIDE TABLETS 10 MG: 10 TABLET, FILM COATED ORAL at 09:04

## 2022-08-24 RX ADMIN — METOPROLOL SUCCINATE 100 MG: 100 TABLET, EXTENDED RELEASE ORAL at 09:04

## 2022-08-24 NOTE — DISCHARGE SUMMARY
[facility]         DISCHARGE SUMMARY    Patient Name: Camilla Marcos  : 1945  MRN: [id number]    Date of Admission: 2022  Date of Discharge: 2022  Primary Care Physician: Homero Ro MD    Consults     Date and Time Order Name Status Description    2022 12:47 PM Inpatient Cardiology Consult      2022 11:22 AM IP General Consult (Use specialty-specific consult if known)      2022 11:03 AM Cardiology (on-call MD unless specified)            Presenting Problem:   Abdominal pain, acute, epigastric [R10.13]  Atrial fibrillation with RVR (HCC) [I48.91]  Acute anemia [D64.9]  Dyspnea on minimal exertion [R06.00]  Acute on chronic congestive heart failure, unspecified heart failure type (HCC) [I50.9]    Active and Resolved Hospital Problems:  Active Hospital Problems    Diagnosis POA   • Atrial fibrillation with RVR (HCC) [I48.91] Yes      Resolved Hospital Problems   No resolved problems to display.         Hospital Course     Hospital Course:  Camilla Marcos is a 77 y.o. female patient was admitted with edema shortness of air found to be in pulmonary edema was treated with IV diuretics potassium supplement patient does still have some sinus tachycardia but otherwise stable she was seen by Dr. Ed Sparks during the hospital stay did have evidence of atrial fibrillation she had stopped taking her diuretics at home that worsen the case otherwise she had been stable before she was also given IV iron because of iron deficiency she is feeling much better today she wants to go home she says she has appointments to get her teeth fixed and needs to be out stable not in acute distress does not have any symptoms she still has some sinus tachycardia which will be monitored will be discharged home today and will be seen in a follow-up visit next week in the office      DISCHARGE Follow Up Recommendations for labs and diagnostics: With CHF atrial fibrillation history of  breast cancer and gastrointestinal stromal tumor      Pertinent  and/or Most Recent Results     LAB RESULTS:      Lab 08/22/22  0846 08/21/22  0417 08/20/22  1006 08/20/22  0743   WBC 8.44 9.45  --  9.53   HEMOGLOBIN 11.3* 10.5*  --  10.3*   HEMATOCRIT 36.3 33.3*  --  33.8*   PLATELETS 435 340  --  382   NEUTROS ABS 4.77 5.12  --  5.87   IMMATURE GRANS (ABS) 0.02 0.02  --  0.03   LYMPHS ABS 2.04 2.47  --  2.20   MONOS ABS 1.28* 1.51*  --  1.18*   EOS ABS 0.28 0.25  --  0.17   MCV 77.9* 77.6*  --  79.7   LACTATE  --   --  1.8  --    PROTIME  --  18.4*  --   --          Lab 08/23/22  0427 08/22/22  0846 08/21/22  0417 08/20/22  0743   SODIUM 141 137 140 137   POTASSIUM 3.0* 3.8 3.2* 4.1   CHLORIDE 102 98 99 101   CO2 24.4 26.5 25.9 23.1   ANION GAP 14.6 12.5 15.1* 12.9   BUN 25* 24* 18 21   CREATININE 0.82 0.93 0.81 0.81   EGFR 73.8 63.4 74.9 74.9   GLUCOSE 167* 240* 115* 207*   CALCIUM 8.8 9.0 9.2 9.1   MAGNESIUM 1.7  --   --   --    TSH 0.566  --   --   --          Lab 08/21/22  0417 08/20/22  0743   TOTAL PROTEIN 6.5 6.9   ALBUMIN 3.70 4.00   GLOBULIN 2.8 2.9   ALT (SGPT) 21 20   AST (SGOT) 21 17   BILIRUBIN 0.8 0.8   ALK PHOS 79 87   LIPASE  --  22         Lab 08/21/22 0417 08/20/22  0743   PROBNP  --  5,053.0*   TROPONIN T  --  <0.010   PROTIME 18.4*  --    INR 1.51*  --              Lab 08/22/22  0846   IRON 30*   IRON SATURATION 7*   TIBC 457   TRANSFERRIN 307   FERRITIN 22.72   FOLATE 13.10         Brief Urine Lab Results  (Last result in the past 365 days)      Color   Clarity   Blood   Leuk Est   Nitrite   Protein   CREAT   Urine HCG        04/24/22 0721 Yellow   Clear   Small (1+)   Negative   Negative   Negative               Microbiology Results (last 10 days)     ** No results found for the last 240 hours. **          CT Abdomen Pelvis Without Contrast    Result Date: 8/20/2022  Impression:   CT scan of the abdomen and pelvis without contrast demonstrating small pleural effusions.  Ill-defined increased  density in fat adjacent to the posterior aspect of the hepatic flexure may represent inflammatory change.  A small amount of peritoneal fluid is seen adjacent to the inferior right liver lobe.  A moderate amount of peritoneal fluid is seen in the pelvis.     NANNETTE EDWARDS MD       Electronically Signed and Approved By: NANNETTE EDWARDS MD on 8/20/2022 at 9:19             XR Chest 1 View    Result Date: 8/20/2022  Impression:   No active disease is seen.       NNANETTE EWDARDS MD       Electronically Signed and Approved By: NANNETTE EDWARDS MD on 8/20/2022 at 8:31                       Results for orders placed during the hospital encounter of 08/20/22    Adult Transthoracic Echo Complete w/ Color, Spectral and Contrast if necessary per protocol    Interpretation Summary  · Estimated right ventricular systolic pressure from tricuspid regurgitation is normal (<35 mmHg).  · Left ventricular ejection fraction appears to be 51 - 55%.  · Left atrial volume is mildly increased.    There were no apparent intracardiac masses, vegetations or thrombi.      Labs Pending at Discharge:  Pending Labs     Order Current Status    Basic Metabolic Panel In process    Methylmalonic Acid, Serum In process    Protein Electrophoresis, 24 Hr Urine - Urine, Clean Catch In process            Discharge Details        Discharge Medications      New Medications      Instructions Start Date   dilTIAZem  MG 24 hr capsule  Commonly known as: CARDIZEM CD   120 mg, Oral, Daily      midodrine 5 MG tablet  Commonly known as: PROAMATINE   5 mg, Oral, 3 Times Daily Before Meals      potassium chloride 20 MEQ packet  Commonly known as: KLOR-CON   20 mEq, Oral, 2 Times Daily         Continue These Medications      Instructions Start Date   albuterol sulfate  (90 Base) MCG/ACT inhaler  Commonly known as: PROVENTIL HFA;VENTOLIN HFA;PROAIR HFA   2 puffs, Inhalation, Every 4 Hours PRN      budesonide-formoterol 160-4.5 MCG/ACT inhaler  Commonly known  as: SYMBICORT   2 puffs, Inhalation, 2 Times Daily PRN      cetirizine 10 MG tablet  Commonly known as: zyrTEC   10 mg, Oral, Daily      Eliquis 5 MG tablet tablet  Generic drug: apixaban   5 mg, Oral, Every 12 Hours Scheduled, Resume 6/30/2022      levothyroxine 25 MCG tablet  Commonly known as: SYNTHROID, LEVOTHROID   25 mcg, Oral, Daily      metoprolol succinate  MG 24 hr tablet  Commonly known as: TOPROL-XL   100 mg, Oral, 2 Times Daily         Stop These Medications    Spiriva Respimat 2.5 MCG/ACT aerosol solution inhaler  Generic drug: tiotropium bromide monohydrate     VITAMIN B-12 IJ            Allergies   Allergen Reactions   • Amoxicillin Rash     Tolerated ancef 2gm 07/23/2021   • Contrast Dye Rash   • Iodine Rash         Discharge Disposition:  Home or Self Care    Diet:  Hospital:  Diet Order   Procedures   • Diet Regular; Consistent Carbohydrate         Discharge Activity:         CODE STATUS:  Code Status and Medical Interventions:   Ordered at: 08/20/22 1512     Level Of Support Discussed With:    Patient     Code Status (Patient has no pulse and is not breathing):    CPR (Attempt to Resuscitate)     Medical Interventions (Patient has pulse or is breathing):    Full Support         Future Appointments   Date Time Provider Department Center   11/1/2022  9:15 AM Lily Bettencourt MD [facility]           Time spent on Discharge including face to face service: 45 minutes    Electronically signed by Homero Ro MD, 08/24/22, 8:50 AM EDT.

## 2022-08-24 NOTE — CASE MANAGEMENT/SOCIAL WORK
Pt discharging home today.     Cardizem, Proamatine and Potassium chloride started. Lasix 40 mg BID ordered from 8/24-8/27.     Pt requested to make own follow up appt with Dr Burger after discharge.

## 2022-08-24 NOTE — PLAN OF CARE
Goal Outcome Evaluation:  Plan of Care Reviewed With: patient        Progress: no change  Outcome Evaluation: VSS. Patient did have episode of afib rvr with ambulation but rate decreased post activity. Given IV iron as ordered. No complaints at this time. YAQUELIN,RN

## 2022-08-24 NOTE — PLAN OF CARE
Goal Outcome Evaluation:  Plan of Care Reviewed With: patient        Progress: improving  Outcome Evaluation: PO cardizem dose increased. Given prn tylenol for tooth pain. No complaints at this time. YAQUELIN,RN

## 2022-08-25 NOTE — OUTREACH NOTE
Prep Survey    Flowsheet Row Responses   [facility] facility [facility] discharged from? [facility]   Is LACE score < 7 ? No   Emergency Room discharge w/ pulse ox? No   Eligibility Readm Mgmt   Discharge diagnosis Atrial fibrillation with RVR   Does the patient have one of the following disease processes/diagnoses(primary or secondary)? Other   Does the patient have Home health ordered? No   Is there a DME ordered? No   Medication alerts for this patient see AVS   Prep survey completed? Yes          MERISSA GARG - Registered Nurse

## 2022-09-09 ENCOUNTER — READMISSION MANAGEMENT (OUTPATIENT)
Dept: CALL CENTER | Facility: HOSPITAL | Age: 77
End: 2022-09-09

## 2022-09-09 NOTE — OUTREACH NOTE
Medical Week 3 Survey    Flowsheet Row Responses   [facility] facility patient discharged from? [facility]   Does the patient have one of the following disease processes/diagnoses(primary or secondary)? Other   Week 3 attempt successful? No   Unsuccessful attempts Attempt 1          EDUARDO Rg Registered Nurse

## 2022-09-14 ENCOUNTER — READMISSION MANAGEMENT (OUTPATIENT)
Dept: CALL CENTER | Facility: HOSPITAL | Age: 77
End: 2022-09-14

## 2022-09-14 NOTE — OUTREACH NOTE
Medical Week 3 Survey    Flowsheet Row Responses   [facility] patient discharged from? [facility]   Does the patient have one of the following disease processes/diagnoses(primary or secondary)? Other   Week 3 attempt successful? Yes   Call start time 1036   Call end time 1037   Discharge diagnosis Atrial fibrillation with RVR   Person spoke with today (if not patient) and relationship DaughterArcenio Fry reviewed with patient/caregiver? Yes   Is the patient taking all medications as directed (includes completed medication regime)? Yes   Does the patient have a primary care provider?  Yes   Has the patient kept scheduled appointments due by today? Yes   Psychosocial issues? No   Did the patient receive a copy of their discharge instructions? Yes   Nursing interventions Reviewed instructions with patient   What is the patient's perception of their health status since discharge? Improving   Is the patient/caregiver able to teach back signs and symptoms related to disease process for when to call PCP? Yes   Is the patient/caregiver able to teach back signs and symptoms related to disease process for when to call 911? Yes   Is the patient/caregiver able to teach back the hierarchy of who to call/visit for symptoms/problems? PCP, Specialist, Home health nurse, Urgent Care, ED, 911 Yes   Week 3 Call Completed? Yes   Wrap up additional comments Daughter reports Pt is fine. No needs at this time.           RAGHAV GELLER - Registered Nurse

## 2022-09-22 ENCOUNTER — READMISSION MANAGEMENT (OUTPATIENT)
Dept: CALL CENTER | Facility: HOSPITAL | Age: 77
End: 2022-09-22

## 2022-09-22 NOTE — OUTREACH NOTE
Medical Week 4 Survey    Flowsheet Row Responses   [facility] facility patient discharged from? [facility]   Does the patient have one of the following disease processes/diagnoses(primary or secondary)? Other   Week 4 attempt successful? No          KEYA GRAF - Registered Nurse

## 2022-10-13 ENCOUNTER — TRANSCRIBE ORDERS (OUTPATIENT)
Dept: ADMINISTRATIVE | Facility: HOSPITAL | Age: 77
End: 2022-10-13

## 2022-10-13 DIAGNOSIS — E78.5 HYPERLIPIDEMIA, UNSPECIFIED HYPERLIPIDEMIA TYPE: ICD-10-CM

## 2022-10-13 DIAGNOSIS — N95.9 MENOPAUSAL DISORDER: ICD-10-CM

## 2022-10-13 DIAGNOSIS — Z12.31 SCREENING MAMMOGRAM FOR BREAST CANCER: Primary | ICD-10-CM

## 2022-10-26 ENCOUNTER — HOSPITAL ENCOUNTER (OUTPATIENT)
Dept: CT IMAGING | Facility: HOSPITAL | Age: 77
Discharge: HOME | End: 2022-10-26
Admitting: THORACIC SURGERY (CARDIOTHORACIC VASCULAR SURGERY)

## 2022-10-26 DIAGNOSIS — C49.A9 MALIGNANT GASTROINTESTINAL STROMAL TUMOR (GIST) OF OTHER SITE: ICD-10-CM

## 2022-10-26 PROCEDURE — 71250 CT THORAX DX C-: CPT

## 2022-10-26 PROCEDURE — 74176 CT ABD & PELVIS W/O CONTRAST: CPT

## 2022-11-01 ENCOUNTER — OFFICE VISIT (OUTPATIENT)
Dept: OTHER | Facility: HOSPITAL | Age: 77
End: 2022-11-01

## 2022-11-01 VITALS
OXYGEN SATURATION: 98 % | SYSTOLIC BLOOD PRESSURE: 122 MMHG | WEIGHT: 137 LBS | HEIGHT: 67 IN | BODY MASS INDEX: 21.5 KG/M2 | DIASTOLIC BLOOD PRESSURE: 82 MMHG | HEART RATE: 96 BPM

## 2022-11-01 DIAGNOSIS — J92.9 PLEURAL THICKENING: ICD-10-CM

## 2022-11-01 DIAGNOSIS — C49.A9 MALIGNANT GASTROINTESTINAL STROMAL TUMOR (GIST) OF OTHER SITE: Primary | ICD-10-CM

## 2022-11-01 PROCEDURE — 99214 OFFICE O/P EST MOD 30 MIN: CPT | Performed by: THORACIC SURGERY (CARDIOTHORACIC VASCULAR SURGERY)

## 2022-11-01 PROCEDURE — G0463 HOSPITAL OUTPT CLINIC VISIT: HCPCS | Performed by: THORACIC SURGERY (CARDIOTHORACIC VASCULAR SURGERY)

## 2022-11-01 RX ORDER — FUROSEMIDE 40 MG/1
40 TABLET ORAL DAILY
COMMUNITY
Start: 2022-08-30

## 2022-11-01 NOTE — PROGRESS NOTES
"Chief Complaint  Esophageal cancer  Subjective        Camilla Marcos presents to [facility]  History of Present Illness     Ms. 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 She presents today with a CT of the chest, abdomen, and pelvis for surveillance.    Ms. Marcos reports that she is doing well overall. She has been experiencing upper respiratory symptoms including cough and decreased appetite. She has been experiencing dysphagia when she eats meat. She states that she does not eat much beef. She is unable to eat steak, pork, and chicken. She states that she has been drinking water, which helps clear her throat. She states that she eats what she wants to. She states that she has a mindset where she needs to gain weight. She states that Dr. Ro keeps tracking her A1c, and he told her that it is okay. She states that she has an appointment with him on 11/14/2022.    She states that she is scheduled to have teeth pulled next week.    Objective   Vital Signs:  There were no vitals taken for this visit.  Estimated body mass index is 21.82 kg/m² as calculated from the following:    Height as of 8/20/22: 170.2 cm (67.01\").    Weight as of 8/20/22: 63.2 kg (139 lb 5.3 oz).    BMI is within normal parameters. No other follow-up for BMI required.      Physical Exam  Vitals and nursing note reviewed.   Constitutional:       Appearance: She is well-developed.   HENT:      Head: Normocephalic and atraumatic.      Nose: Nose normal.   Eyes:      Conjunctiva/sclera: Conjunctivae normal.   Cardiovascular:      Rate and Rhythm: Normal rate.   Pulmonary:      Effort: Pulmonary effort is normal.   Abdominal:      Palpations: Abdomen is soft.   Musculoskeletal:      Cervical back: Neck supple.   Skin:     General: Skin is warm and dry.   Neurological:      Mental Status: She is alert and " oriented to person, place, and time.   Psychiatric:         Behavior: Behavior normal.         Thought Content: Thought content normal.         Judgment: Judgment normal.        Result Review :      I have independently reviewed the CT of the chest and abdomen performed on 10/26/2022 which demonstrates increasing in the pleural thickening superior and the right hemithorax associated with her prior anastomotic leak. There are no new lung nodules. There is no mediastinal or hilar lymphadenopathy.                Assessment and Plan   There are no diagnoses linked to this encounter.    Camilla Marcos is a pleasant 77-year-old lady with an esophagectomy status post GIST tumor. She is no longer taking her Gleevec secondary to failure to thrive issues. She does have a developing posterolateral pleural thickening that is likely secondary to her recent anastomotic leak; however, it could be related to her GIST tumor, but this is less likely. Given that she is not taking the Gleevac, I would like to obtain a PET CT scan and see her back. If this is hypermetabolic on PET CT scan, then we will plan a biopsy of this area. The patient was advised to inform her oral surgeon that she will not be able to lay flat due to high risk of aspiration.       I spent 30 minutes caring for Camilla on this date of service. This time includes time spent by me in the following activities:preparing for the visit, reviewing tests, obtaining and/or reviewing a separately obtained history, performing a medically appropriate examination and/or evaluation , counseling and educating the patient/family/caregiver, ordering medications, tests, or procedures, documenting information in the medical record and independently interpreting results and communicating that information with the patient/family/caregiver  Follow Up   No follow-ups on file.  Patient was given instructions and counseling regarding her condition or for health maintenance advice. Please  see specific information pulled into the AVS if appropriate.     Transcribed from ambient dictation for Lily Bettencourt MD by Leta Velez, Quality .  11/01/22   10:54 EDT    Patient or patient representative verbalized consent to the visit recording.  I have personally performed the services described in this document as transcribed by the above individual, and it is both accurate and complete.

## 2022-11-14 ENCOUNTER — APPOINTMENT (OUTPATIENT)
Dept: PET IMAGING | Facility: HOSPITAL | Age: 77
End: 2022-11-14

## 2022-11-29 ENCOUNTER — HOSPITAL ENCOUNTER (OUTPATIENT)
Dept: PET IMAGING | Facility: HOSPITAL | Age: 77
Discharge: HOME | End: 2022-11-29

## 2022-11-29 DIAGNOSIS — J92.9 PLEURAL THICKENING: ICD-10-CM

## 2022-11-29 DIAGNOSIS — C49.A9 MALIGNANT GASTROINTESTINAL STROMAL TUMOR (GIST) OF OTHER SITE: ICD-10-CM

## 2022-11-29 LAB — GLUCOSE BLDC GLUCOMTR-MCNC: 152 MG/DL (ref 70–130)

## 2022-11-29 PROCEDURE — 0 FLUDEOXYGLUCOSE F18 SOLUTION: Performed by: THORACIC SURGERY (CARDIOTHORACIC VASCULAR SURGERY)

## 2022-11-29 PROCEDURE — 78815 PET IMAGE W/CT SKULL-THIGH: CPT

## 2022-11-29 PROCEDURE — A9552 F18 FDG: HCPCS | Performed by: THORACIC SURGERY (CARDIOTHORACIC VASCULAR SURGERY)

## 2022-11-29 PROCEDURE — 82962 GLUCOSE BLOOD TEST: CPT

## 2022-11-29 RX ADMIN — FLUDEOXYGLUCOSE F18 1 DOSE: 300 INJECTION INTRAVENOUS at 12:45

## 2022-12-06 ENCOUNTER — PREP FOR SURGERY (OUTPATIENT)
Dept: OTHER | Facility: HOSPITAL | Age: 77
End: 2022-12-06

## 2022-12-06 ENCOUNTER — OFFICE VISIT (OUTPATIENT)
Dept: OTHER | Facility: HOSPITAL | Age: 77
End: 2022-12-06

## 2022-12-06 VITALS
HEIGHT: 67 IN | HEART RATE: 87 BPM | DIASTOLIC BLOOD PRESSURE: 72 MMHG | WEIGHT: 135 LBS | OXYGEN SATURATION: 96 % | BODY MASS INDEX: 21.19 KG/M2 | SYSTOLIC BLOOD PRESSURE: 102 MMHG

## 2022-12-06 DIAGNOSIS — K91.89 GASTRIC LEAK: ICD-10-CM

## 2022-12-06 DIAGNOSIS — R79.1 ABNORMAL COAGULATION PROFILE: ICD-10-CM

## 2022-12-06 DIAGNOSIS — C49.A9 MALIGNANT GASTROINTESTINAL STROMAL TUMOR (GIST) OF OTHER SITE: Primary | ICD-10-CM

## 2022-12-06 PROBLEM — C49.9: Status: ACTIVE | Noted: 2022-12-06

## 2022-12-06 PROCEDURE — 99214 OFFICE O/P EST MOD 30 MIN: CPT | Performed by: THORACIC SURGERY (CARDIOTHORACIC VASCULAR SURGERY)

## 2022-12-06 RX ORDER — SODIUM CHLORIDE 0.9 % (FLUSH) 0.9 %
3 SYRINGE (ML) INJECTION EVERY 12 HOURS SCHEDULED
Status: CANCELLED | OUTPATIENT
Start: 2023-01-10

## 2022-12-06 RX ORDER — SODIUM CHLORIDE 0.9 % (FLUSH) 0.9 %
3-10 SYRINGE (ML) INJECTION AS NEEDED
Status: CANCELLED | OUTPATIENT
Start: 2023-01-10

## 2022-12-06 RX ORDER — SODIUM CHLORIDE 9 MG/ML
40 INJECTION, SOLUTION INTRAVENOUS AS NEEDED
Status: CANCELLED | OUTPATIENT
Start: 2023-01-10

## 2022-12-06 NOTE — PROGRESS NOTES
"Chief Complaint  No chief complaint on file.    Subjective        Camilla Marcos presents to [facility]  History of Present Illness   Ms. Marcos is a pleasant 77-year-old lady status post esophagectomy for gist tumor who presents with a PET CT scan.    The patient reports she is doing well. She states she is very careful when she eats because she does not have the same taste for things. She reports she is not eating nearly as much as she used to. 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 She states she ate a piece of toast this morning and cheese and crackers before she left to come to the clinic. She reports she has been feeling better lately and when she moves around, she is able to do things better.    Objective   Vital Signs:  /72   Pulse 87   Ht 170.2 cm (67\")   Wt 61.2 kg (135 lb)   SpO2 96%   BMI 21.14 kg/m²   Estimated body mass index is 21.45 kg/m² as calculated from the following:    Height as of 11/1/22: 170.2 cm (67.01\").    Weight as of 11/1/22: 62.1 kg (137 lb).    BMI is within normal parameters. No other follow-up for BMI required.      Physical Exam   Result Review :         I have independently reviewed the PET CT scan performed on 12/02/2022, which demonstrates an increase in the fluid in the right pleural space with a hypermetabolic air fluid collection in the right upper thorax associated with the conduit as well as around the sixth rib. There is also some hypermetabolic activity at the primary anastomosis site. There is no evidence of metastatic disease.         Assessment and Plan   There are no diagnoses linked to this encounter.  Ms. Marcos is a pleasant 77-year-old lady status post esophagectomy for gist tumor. She has developed some air and fluid in her chest concerning for continued issues with her anastomosis. She will need an EGD for evaluation as well as an esophagram. I do not suspect this is recurrent " malignancy based on her original pathology and I suspect this is all inflammatory based on her history of an esophageal perforation at her anastomosis.     I spent 30 minutes caring for Camilla on this date of service. This time includes time spent by me in the following activities:preparing for the visit, reviewing tests, obtaining and/or reviewing a separately obtained history, performing a medically appropriate examination and/or evaluation , counseling and educating the patient/family/caregiver, ordering medications, tests, or procedures, documenting information in the medical record and independently interpreting results and communicating that information with the patient/family/caregiver  Follow Up   No follow-ups on file.  Patient was given instructions and counseling regarding her condition or for health maintenance advice. Please see specific information pulled into the AVS if appropriate.     Transcribed from ambient dictation for Lily Bettencourt MD by Zahra Tucker.  12/06/22   13:54 EST    Patient or patient representative verbalized consent to the visit recording.  I have personally performed the services described in this document as transcribed by the above individual, and it is both accurate and complete.

## 2022-12-07 DIAGNOSIS — R13.19 ESOPHAGEAL DYSPHAGIA: Primary | ICD-10-CM

## 2022-12-08 ENCOUNTER — APPOINTMENT (OUTPATIENT)
Dept: PREADMISSION TESTING | Facility: HOSPITAL | Age: 77
End: 2022-12-08

## 2023-01-03 ENCOUNTER — HOSPITAL ENCOUNTER (OUTPATIENT)
Dept: GENERAL RADIOLOGY | Facility: HOSPITAL | Age: 78
Discharge: HOME | End: 2023-01-03
Payer: MEDICARE

## 2023-01-03 DIAGNOSIS — R13.19 ESOPHAGEAL DYSPHAGIA: ICD-10-CM

## 2023-01-03 PROCEDURE — 71045 X-RAY EXAM CHEST 1 VIEW: CPT

## 2023-01-03 RX ORDER — DIPHENHYDRAMINE HCL 25 MG
25 TABLET ORAL TAKE AS DIRECTED
Qty: 2 TABLET | Refills: 0 | Status: SHIPPED | OUTPATIENT
Start: 2023-01-03

## 2023-01-03 RX ORDER — PREDNISONE 50 MG/1
50 TABLET ORAL TAKE AS DIRECTED
Qty: 3 TABLET | Refills: 0 | Status: ON HOLD | OUTPATIENT
Start: 2023-01-03 | End: 2023-01-10

## 2023-01-04 ENCOUNTER — PRE-ADMISSION TESTING (OUTPATIENT)
Dept: PREADMISSION TESTING | Facility: HOSPITAL | Age: 78
End: 2023-01-04
Payer: MEDICARE

## 2023-01-04 VITALS
DIASTOLIC BLOOD PRESSURE: 90 MMHG | SYSTOLIC BLOOD PRESSURE: 155 MMHG | HEIGHT: 67 IN | OXYGEN SATURATION: 99 % | TEMPERATURE: 98.2 F | RESPIRATION RATE: 20 BRPM | BODY MASS INDEX: 21.14 KG/M2 | WEIGHT: 134.7 LBS | HEART RATE: 96 BPM

## 2023-01-04 DIAGNOSIS — K91.89 GASTRIC LEAK: ICD-10-CM

## 2023-01-04 DIAGNOSIS — C49.A9 MALIGNANT GASTROINTESTINAL STROMAL TUMOR (GIST) OF OTHER SITE: ICD-10-CM

## 2023-01-04 LAB
ANION GAP SERPL CALCULATED.3IONS-SCNC: 9.6 MMOL/L (ref 5–15)
APTT PPP: 32.7 SECONDS (ref 22.7–35.4)
BASOPHILS # BLD AUTO: 0.06 10*3/MM3 (ref 0–0.2)
BASOPHILS NFR BLD AUTO: 0.8 % (ref 0–1.5)
BUN SERPL-MCNC: 11 MG/DL (ref 8–23)
BUN/CREAT SERPL: 13.3 (ref 7–25)
CALCIUM SPEC-SCNC: 9.5 MG/DL (ref 8.6–10.5)
CHLORIDE SERPL-SCNC: 99 MMOL/L (ref 98–107)
CO2 SERPL-SCNC: 29.4 MMOL/L (ref 22–29)
CREAT SERPL-MCNC: 0.83 MG/DL (ref 0.57–1)
DEPRECATED RDW RBC AUTO: 41.2 FL (ref 37–54)
EGFRCR SERPLBLD CKD-EPI 2021: 72.7 ML/MIN/1.73
EOSINOPHIL # BLD AUTO: 0.17 10*3/MM3 (ref 0–0.4)
EOSINOPHIL NFR BLD AUTO: 2.3 % (ref 0.3–6.2)
ERYTHROCYTE [DISTWIDTH] IN BLOOD BY AUTOMATED COUNT: 13.9 % (ref 12.3–15.4)
GLUCOSE SERPL-MCNC: 122 MG/DL (ref 65–99)
HCT VFR BLD AUTO: 35.9 % (ref 34–46.6)
HGB BLD-MCNC: 11.7 G/DL (ref 12–15.9)
IMM GRANULOCYTES # BLD AUTO: 0.02 10*3/MM3 (ref 0–0.05)
IMM GRANULOCYTES NFR BLD AUTO: 0.3 % (ref 0–0.5)
INR PPP: 1.31 (ref 0.9–1.1)
LYMPHOCYTES # BLD AUTO: 3.01 10*3/MM3 (ref 0.7–3.1)
LYMPHOCYTES NFR BLD AUTO: 40 % (ref 19.6–45.3)
MCH RBC QN AUTO: 26.5 PG (ref 26.6–33)
MCHC RBC AUTO-ENTMCNC: 32.6 G/DL (ref 31.5–35.7)
MCV RBC AUTO: 81.4 FL (ref 79–97)
MONOCYTES # BLD AUTO: 0.85 10*3/MM3 (ref 0.1–0.9)
MONOCYTES NFR BLD AUTO: 11.3 % (ref 5–12)
NEUTROPHILS NFR BLD AUTO: 3.42 10*3/MM3 (ref 1.7–7)
NEUTROPHILS NFR BLD AUTO: 45.3 % (ref 42.7–76)
NRBC BLD AUTO-RTO: 0 /100 WBC (ref 0–0.2)
PLATELET # BLD AUTO: 300 10*3/MM3 (ref 140–450)
PMV BLD AUTO: 9.8 FL (ref 6–12)
POTASSIUM SERPL-SCNC: 3.1 MMOL/L (ref 3.5–5.2)
PROTHROMBIN TIME: 16.4 SECONDS (ref 11.7–14.2)
RBC # BLD AUTO: 4.41 10*6/MM3 (ref 3.77–5.28)
SODIUM SERPL-SCNC: 138 MMOL/L (ref 136–145)
WBC NRBC COR # BLD: 7.53 10*3/MM3 (ref 3.4–10.8)

## 2023-01-04 PROCEDURE — 85610 PROTHROMBIN TIME: CPT

## 2023-01-04 PROCEDURE — 85025 COMPLETE CBC W/AUTO DIFF WBC: CPT

## 2023-01-04 PROCEDURE — 85730 THROMBOPLASTIN TIME PARTIAL: CPT

## 2023-01-04 PROCEDURE — 80048 BASIC METABOLIC PNL TOTAL CA: CPT

## 2023-01-04 PROCEDURE — 36415 COLL VENOUS BLD VENIPUNCTURE: CPT

## 2023-01-04 RX ORDER — POTASSIUM CHLORIDE 1.5 G/1.77G
20 POWDER, FOR SOLUTION ORAL WEEKLY
COMMUNITY

## 2023-01-04 NOTE — DISCHARGE INSTRUCTIONS
Take the following medications the morning of surgery:    DILTIAZEM, LEVOTHYROXINE, METOPROLOL, PLEASE  BRING MIDODRINE MEDICATION WITH YOU DAY OF SURGERY    ARRIVE TO MAIN OR DESK 1-10-23 AT 11:00AM      If you are on prescription narcotic pain medication to control your pain you may also take that medication the morning of surgery.    General Instructions:  Do not eat solid food after midnight the night before surgery.  You may drink clear liquids day of surgery but must stop at least one hour before your hospital arrival time.  It is beneficial for you to have a clear drink that contains carbohydrates the day of surgery.  We suggest a 12 to 20 ounce bottle of Gatorade or Powerade for non-diabetic patients or a 12 to 20 ounce bottle of G2 or Powerade Zero for diabetic patients. (Pediatric patients, are not advised to drink a 12 to 20 ounce carbohydrate drink)    Clear liquids are liquids you can see through.  Nothing red in color.     Plain water                               Sports drinks  Sodas                                   Gelatin (Jell-O)  Fruit juices without pulp such as white grape juice and apple juice  Popsicles that contain no fruit or yogurt  Tea or coffee (no cream or milk added)  Gatorade / Powerade  G2 / Powerade Zero    Infants may have breast milk up to four hours before surgery.  Infants drinking formula may drink formula up to six hours before surgery.   Patients who avoid smoking, chewing tobacco and alcohol for 4 weeks prior to surgery have a reduced risk of post-operative complications.  Quit smoking as many days before surgery as you can.  Do not smoke, use chewing tobacco or drink alcohol the day of surgery.   If applicable bring your C-PAP/ BI-PAP machine.  Bring any papers given to you in the doctor’s office.  Wear clean comfortable clothes.  Do not wear contact lenses, false eyelashes or make-up.  Bring a case for your glasses.   Bring crutches or walker if applicable.  Remove all  piercings.  Leave jewelry and any other valuables at home.  Hair extensions with metal clips must be removed prior to surgery.  The Pre-Admission Testing nurse will instruct you to bring medications if unable to obtain an accurate list in Pre-Admission Testing.        Preventing a Surgical Site Infection:  For 2 to 3 days before surgery, avoid shaving with a razor because the razor can irritate skin and make it easier to develop an infection.    Any areas of open skin can increase the risk of a post-operative wound infection by allowing bacteria to enter and travel throughout the body.  Notify your surgeon if you have any skin wounds / rashes even if it is not near the expected surgical site.  The area will need assessed to determine if surgery should be delayed until it is healed.  The night prior to surgery shower using a fresh bar of anti-bacterial soap (such as Dial) and clean washcloth.  Sleep in a clean bed with clean clothing.  Do not allow pets to sleep with you.  Shower on the morning of surgery using a fresh bar of anti-bacterial soap (such as Dial) and clean washcloth.  Dry with a clean towel and dress in clean clothing.  Ask your surgeon if you will be receiving antibiotics prior to surgery.  Make sure you, your family, and all healthcare providers clean their hands with soap and water or an alcohol based hand  before caring for you or your wound.    Day of surgery:  Your arrival time is approximately two hours before your scheduled surgery time.  Upon arrival, a Pre-op nurse and Anesthesiologist will review your health history, obtain vital signs, and answer questions you may have.  The only belongings needed at this time will be a list of your home medications and if applicable your C-PAP/BI-PAP machine.  A Pre-op nurse will start an IV and you may receive medication in preparation for surgery, including something to help you relax.     Please be aware that surgery does come with discomfort.   We want to make every effort to control your discomfort so please discuss any uncontrolled symptoms with your nurse.   Your doctor will most likely have prescribed pain medications.      If you are going home after surgery you will receive individualized written care instructions before being discharged.  A responsible adult must drive you to and from the hospital on the day of your surgery and stay with you for 24 hours.  Discharge prescriptions can be filled by the hospital pharmacy during regular pharmacy hours.  If you are having surgery late in the day/evening your prescription may be e-prescribed to your pharmacy.  Please verify your pharmacy hours or chose a 24 hour pharmacy to avoid not having access to your prescription because your pharmacy has closed for the day.    If you are staying overnight following surgery, you will be transported to your hospital room following the recovery period.  [facility] has all private rooms.    If you have any questions please call Pre-Admission Testing at [phone].  Deductibles and co-payments are collected on the day of service. Please be prepared to pay the required co-pay, deductible or deposit on the day of service as defined by your plan.    Call your surgeon immediately if you experience any of the following symptoms:  Sore Throat  Shortness of Breath or difficulty breathing  Cough  Chills  Body soreness or muscle pain  Headache  Fever  New loss of taste or smell  Do not arrive for your surgery ill.  Your procedure will need to be rescheduled to another time.  You will need to call your physician before the day of surgery to avoid any unnecessary exposure to hospital staff as well as other patients.       CHLORHEXIDINE CLOTH INSTRUCTIONS  The morning of surgery follow these instructions using the Chlorhexidine cloths you've been given.  These steps reduce bacteria on the body.  Do not use the cloths near your eyes, ears mouth, genitalia or on open  wounds.  Throw the cloths away after use but do not try to flush them down a toilet.      Open and remove one cloth at a time from the package.    Leave the cloth unfolded and begin the bathing.  Massage the skin with the cloths using gentle pressure to remove bacteria.  Do not scrub harshly.   Follow the steps below with one 2% CHG cloth per area (6 total cloths).  One cloth for neck, shoulders and chest.  One cloth for both arms, hands, fingers and underarms (do underarms last).  One cloth for the abdomen followed by groin.  One cloth for right leg and foot including between the toes.  One cloth for left leg and foot including between the toes.  The last cloth is to be used for the back of the neck, back and buttocks.    Allow the CHG to air dry 3 minutes on the skin which will give it time to work and decrease the chance of irritation.  The skin may feel sticky until it is dry.  Do not rinse with water or any other liquid or you will lose the beneficial effects of the CHG.  If mild skin irritation occurs, do rinse the skin to remove the CHG.  Report this to the nurse at time of admission.  Do not apply lotions, creams, ointments, deodorants or perfumes after using the clothes. Dress in clean clothes before coming to the hospital.

## 2023-01-05 ENCOUNTER — HOSPITAL ENCOUNTER (OUTPATIENT)
Dept: CT IMAGING | Facility: HOSPITAL | Age: 78
Discharge: HOME | End: 2023-01-05
Payer: MEDICARE

## 2023-01-05 ENCOUNTER — HOSPITAL ENCOUNTER (OUTPATIENT)
Dept: GENERAL RADIOLOGY | Facility: HOSPITAL | Age: 78
Discharge: HOME | End: 2023-01-05
Payer: MEDICARE

## 2023-01-05 DIAGNOSIS — R13.19 ESOPHAGEAL DYSPHAGIA: ICD-10-CM

## 2023-01-05 PROCEDURE — 71250 CT THORAX DX C-: CPT

## 2023-01-05 PROCEDURE — 74221 X-RAY XM ESOPHAGUS 2CNTRST: CPT

## 2023-01-10 ENCOUNTER — ANESTHESIA (OUTPATIENT)
Dept: PERIOP | Facility: HOSPITAL | Age: 78
End: 2023-01-10
Payer: MEDICARE

## 2023-01-10 ENCOUNTER — HOSPITAL ENCOUNTER (OUTPATIENT)
Facility: HOSPITAL | Age: 78
Setting detail: HOSPITAL OUTPATIENT SURGERY
Discharge: HOME | End: 2023-01-10
Attending: THORACIC SURGERY (CARDIOTHORACIC VASCULAR SURGERY) | Admitting: THORACIC SURGERY (CARDIOTHORACIC VASCULAR SURGERY)
Payer: MEDICARE

## 2023-01-10 ENCOUNTER — ANESTHESIA EVENT (OUTPATIENT)
Dept: PERIOP | Facility: HOSPITAL | Age: 78
End: 2023-01-10
Payer: MEDICARE

## 2023-01-10 VITALS
RESPIRATION RATE: 16 BRPM | HEART RATE: 72 BPM | TEMPERATURE: 98.4 F | DIASTOLIC BLOOD PRESSURE: 84 MMHG | BODY MASS INDEX: 21.1 KG/M2 | SYSTOLIC BLOOD PRESSURE: 155 MMHG | HEIGHT: 67 IN | OXYGEN SATURATION: 96 %

## 2023-01-10 DIAGNOSIS — K91.89 GASTRIC LEAK: ICD-10-CM

## 2023-01-10 DIAGNOSIS — C49.A9 MALIGNANT GASTROINTESTINAL STROMAL TUMOR (GIST) OF OTHER SITE: ICD-10-CM

## 2023-01-10 LAB
GLUCOSE BLDC GLUCOMTR-MCNC: 119 MG/DL (ref 70–130)
GLUCOSE BLDC GLUCOMTR-MCNC: 170 MG/DL (ref 70–130)

## 2023-01-10 PROCEDURE — 25010000002 FENTANYL CITRATE (PF) 50 MCG/ML SOLUTION: Performed by: NURSE ANESTHETIST, CERTIFIED REGISTERED

## 2023-01-10 PROCEDURE — S0260 H&P FOR SURGERY: HCPCS | Performed by: THORACIC SURGERY (CARDIOTHORACIC VASCULAR SURGERY)

## 2023-01-10 PROCEDURE — 25010000002 PROPOFOL 10 MG/ML EMULSION: Performed by: NURSE ANESTHETIST, CERTIFIED REGISTERED

## 2023-01-10 PROCEDURE — 25010000002 MIDAZOLAM PER 1 MG: Performed by: ANESTHESIOLOGY

## 2023-01-10 PROCEDURE — 43235 EGD DIAGNOSTIC BRUSH WASH: CPT | Performed by: THORACIC SURGERY (CARDIOTHORACIC VASCULAR SURGERY)

## 2023-01-10 PROCEDURE — C1713 ANCHOR/SCREW BN/BN,TIS/BN: HCPCS | Performed by: THORACIC SURGERY (CARDIOTHORACIC VASCULAR SURGERY)

## 2023-01-10 PROCEDURE — 82962 GLUCOSE BLOOD TEST: CPT

## 2023-01-10 PROCEDURE — 25010000002 ONDANSETRON PER 1 MG: Performed by: NURSE ANESTHETIST, CERTIFIED REGISTERED

## 2023-01-10 PROCEDURE — 25010000002 DEXAMETHASONE SODIUM PHOSPHATE 20 MG/5ML SOLUTION: Performed by: NURSE ANESTHETIST, CERTIFIED REGISTERED

## 2023-01-10 RX ORDER — NALOXONE HCL 0.4 MG/ML
0.2 VIAL (ML) INJECTION AS NEEDED
Status: DISCONTINUED | OUTPATIENT
Start: 2023-01-10 | End: 2023-01-10 | Stop reason: HOSPADM

## 2023-01-10 RX ORDER — FAMOTIDINE 10 MG/ML
20 INJECTION, SOLUTION INTRAVENOUS ONCE
Status: COMPLETED | OUTPATIENT
Start: 2023-01-10 | End: 2023-01-10

## 2023-01-10 RX ORDER — LIDOCAINE HYDROCHLORIDE 20 MG/ML
INJECTION, SOLUTION INFILTRATION; PERINEURAL AS NEEDED
Status: DISCONTINUED | OUTPATIENT
Start: 2023-01-10 | End: 2023-01-10 | Stop reason: SURG

## 2023-01-10 RX ORDER — SODIUM CHLORIDE 0.9 % (FLUSH) 0.9 %
3 SYRINGE (ML) INJECTION EVERY 12 HOURS SCHEDULED
Status: DISCONTINUED | OUTPATIENT
Start: 2023-01-10 | End: 2023-01-10 | Stop reason: HOSPADM

## 2023-01-10 RX ORDER — LIDOCAINE HYDROCHLORIDE 10 MG/ML
0.5 INJECTION, SOLUTION EPIDURAL; INFILTRATION; INTRACAUDAL; PERINEURAL ONCE AS NEEDED
Status: DISCONTINUED | OUTPATIENT
Start: 2023-01-10 | End: 2023-01-10 | Stop reason: HOSPADM

## 2023-01-10 RX ORDER — SODIUM CHLORIDE 0.9 % (FLUSH) 0.9 %
3-10 SYRINGE (ML) INJECTION AS NEEDED
Status: DISCONTINUED | OUTPATIENT
Start: 2023-01-10 | End: 2023-01-10 | Stop reason: HOSPADM

## 2023-01-10 RX ORDER — FLUMAZENIL 0.1 MG/ML
0.2 INJECTION INTRAVENOUS AS NEEDED
Status: DISCONTINUED | OUTPATIENT
Start: 2023-01-10 | End: 2023-01-10 | Stop reason: HOSPADM

## 2023-01-10 RX ORDER — SODIUM CHLORIDE 9 MG/ML
40 INJECTION, SOLUTION INTRAVENOUS AS NEEDED
Status: DISCONTINUED | OUTPATIENT
Start: 2023-01-10 | End: 2023-01-10 | Stop reason: HOSPADM

## 2023-01-10 RX ORDER — PROPOFOL 10 MG/ML
VIAL (ML) INTRAVENOUS AS NEEDED
Status: DISCONTINUED | OUTPATIENT
Start: 2023-01-10 | End: 2023-01-10 | Stop reason: SURG

## 2023-01-10 RX ORDER — ROCURONIUM BROMIDE 10 MG/ML
INJECTION, SOLUTION INTRAVENOUS AS NEEDED
Status: DISCONTINUED | OUTPATIENT
Start: 2023-01-10 | End: 2023-01-10 | Stop reason: SURG

## 2023-01-10 RX ORDER — ACETAMINOPHEN 325 MG/1
650 TABLET ORAL EVERY 6 HOURS PRN
COMMUNITY

## 2023-01-10 RX ORDER — PROMETHAZINE HYDROCHLORIDE 25 MG/1
25 TABLET ORAL ONCE AS NEEDED
Status: DISCONTINUED | OUTPATIENT
Start: 2023-01-10 | End: 2023-01-10 | Stop reason: HOSPADM

## 2023-01-10 RX ORDER — SODIUM CHLORIDE, SODIUM LACTATE, POTASSIUM CHLORIDE, CALCIUM CHLORIDE 600; 310; 30; 20 MG/100ML; MG/100ML; MG/100ML; MG/100ML
9 INJECTION, SOLUTION INTRAVENOUS CONTINUOUS
Status: DISCONTINUED | OUTPATIENT
Start: 2023-01-10 | End: 2023-01-10 | Stop reason: HOSPADM

## 2023-01-10 RX ORDER — HYDRALAZINE HYDROCHLORIDE 20 MG/ML
5 INJECTION INTRAMUSCULAR; INTRAVENOUS
Status: DISCONTINUED | OUTPATIENT
Start: 2023-01-10 | End: 2023-01-10 | Stop reason: HOSPADM

## 2023-01-10 RX ORDER — OXYCODONE AND ACETAMINOPHEN 7.5; 325 MG/1; MG/1
1 TABLET ORAL EVERY 4 HOURS PRN
Status: DISCONTINUED | OUTPATIENT
Start: 2023-01-10 | End: 2023-01-10 | Stop reason: HOSPADM

## 2023-01-10 RX ORDER — LABETALOL HYDROCHLORIDE 5 MG/ML
5 INJECTION, SOLUTION INTRAVENOUS
Status: DISCONTINUED | OUTPATIENT
Start: 2023-01-10 | End: 2023-01-10 | Stop reason: HOSPADM

## 2023-01-10 RX ORDER — FENTANYL CITRATE 50 UG/ML
INJECTION, SOLUTION INTRAMUSCULAR; INTRAVENOUS AS NEEDED
Status: DISCONTINUED | OUTPATIENT
Start: 2023-01-10 | End: 2023-01-10 | Stop reason: SURG

## 2023-01-10 RX ORDER — DIPHENHYDRAMINE HCL 25 MG
25 CAPSULE ORAL
Status: DISCONTINUED | OUTPATIENT
Start: 2023-01-10 | End: 2023-01-10 | Stop reason: HOSPADM

## 2023-01-10 RX ORDER — FENTANYL CITRATE 50 UG/ML
50 INJECTION, SOLUTION INTRAMUSCULAR; INTRAVENOUS
Status: DISCONTINUED | OUTPATIENT
Start: 2023-01-10 | End: 2023-01-10 | Stop reason: HOSPADM

## 2023-01-10 RX ORDER — DIPHENHYDRAMINE HYDROCHLORIDE 50 MG/ML
12.5 INJECTION INTRAMUSCULAR; INTRAVENOUS
Status: DISCONTINUED | OUTPATIENT
Start: 2023-01-10 | End: 2023-01-10 | Stop reason: HOSPADM

## 2023-01-10 RX ORDER — IBUPROFEN 200 MG
200 TABLET ORAL EVERY 6 HOURS PRN
COMMUNITY

## 2023-01-10 RX ORDER — DEXAMETHASONE SODIUM PHOSPHATE 4 MG/ML
INJECTION, SOLUTION INTRA-ARTICULAR; INTRALESIONAL; INTRAMUSCULAR; INTRAVENOUS; SOFT TISSUE AS NEEDED
Status: DISCONTINUED | OUTPATIENT
Start: 2023-01-10 | End: 2023-01-10 | Stop reason: SURG

## 2023-01-10 RX ORDER — ONDANSETRON 2 MG/ML
4 INJECTION INTRAMUSCULAR; INTRAVENOUS ONCE AS NEEDED
Status: DISCONTINUED | OUTPATIENT
Start: 2023-01-10 | End: 2023-01-10 | Stop reason: HOSPADM

## 2023-01-10 RX ORDER — HYDROMORPHONE HYDROCHLORIDE 1 MG/ML
0.5 INJECTION, SOLUTION INTRAMUSCULAR; INTRAVENOUS; SUBCUTANEOUS
Status: DISCONTINUED | OUTPATIENT
Start: 2023-01-10 | End: 2023-01-10 | Stop reason: HOSPADM

## 2023-01-10 RX ORDER — EPHEDRINE SULFATE 50 MG/ML
5 INJECTION, SOLUTION INTRAVENOUS ONCE AS NEEDED
Status: DISCONTINUED | OUTPATIENT
Start: 2023-01-10 | End: 2023-01-10 | Stop reason: HOSPADM

## 2023-01-10 RX ORDER — HYDROCODONE BITARTRATE AND ACETAMINOPHEN 7.5; 325 MG/1; MG/1
1 TABLET ORAL ONCE AS NEEDED
Status: DISCONTINUED | OUTPATIENT
Start: 2023-01-10 | End: 2023-01-10 | Stop reason: HOSPADM

## 2023-01-10 RX ORDER — PROMETHAZINE HYDROCHLORIDE 25 MG/1
25 SUPPOSITORY RECTAL ONCE AS NEEDED
Status: DISCONTINUED | OUTPATIENT
Start: 2023-01-10 | End: 2023-01-10 | Stop reason: HOSPADM

## 2023-01-10 RX ORDER — MIDAZOLAM HYDROCHLORIDE 1 MG/ML
0.5 INJECTION INTRAMUSCULAR; INTRAVENOUS
Status: DISCONTINUED | OUTPATIENT
Start: 2023-01-10 | End: 2023-01-10 | Stop reason: HOSPADM

## 2023-01-10 RX ORDER — ONDANSETRON 2 MG/ML
INJECTION INTRAMUSCULAR; INTRAVENOUS AS NEEDED
Status: DISCONTINUED | OUTPATIENT
Start: 2023-01-10 | End: 2023-01-10 | Stop reason: SURG

## 2023-01-10 RX ADMIN — LIDOCAINE HYDROCHLORIDE 60 MG: 20 INJECTION, SOLUTION INFILTRATION; PERINEURAL at 13:53

## 2023-01-10 RX ADMIN — ROCURONIUM BROMIDE 30 MG: 50 INJECTION INTRAVENOUS at 13:53

## 2023-01-10 RX ADMIN — FENTANYL CITRATE 25 MCG: 50 INJECTION, SOLUTION INTRAMUSCULAR; INTRAVENOUS at 14:00

## 2023-01-10 RX ADMIN — FENTANYL CITRATE 25 MCG: 50 INJECTION, SOLUTION INTRAMUSCULAR; INTRAVENOUS at 13:53

## 2023-01-10 RX ADMIN — DEXAMETHASONE SODIUM PHOSPHATE 4 MG: 4 INJECTION, SOLUTION INTRAMUSCULAR; INTRAVENOUS at 14:04

## 2023-01-10 RX ADMIN — PROPOFOL 150 MG: 10 INJECTION, EMULSION INTRAVENOUS at 13:53

## 2023-01-10 RX ADMIN — SUGAMMADEX 200 MG: 100 INJECTION, SOLUTION INTRAVENOUS at 14:09

## 2023-01-10 RX ADMIN — MIDAZOLAM 0.5 MG: 1 INJECTION INTRAMUSCULAR; INTRAVENOUS at 12:46

## 2023-01-10 RX ADMIN — ONDANSETRON 4 MG: 2 INJECTION INTRAMUSCULAR; INTRAVENOUS at 14:04

## 2023-01-10 RX ADMIN — SODIUM CHLORIDE, POTASSIUM CHLORIDE, SODIUM LACTATE AND CALCIUM CHLORIDE 9 ML/HR: 600; 310; 30; 20 INJECTION, SOLUTION INTRAVENOUS at 12:46

## 2023-01-10 RX ADMIN — FAMOTIDINE 20 MG: 10 INJECTION INTRAVENOUS at 12:46

## 2023-01-10 NOTE — ANESTHESIA PROCEDURE NOTES
Airway  Urgency: elective    Date/Time: 1/10/2023 1:54 PM  Airway not difficult    General Information and Staff    Patient location during procedure: OR  Anesthesiologist: Anuj Llamas MD  CRNA/CAA: Liang Mcdowell CRNA    Indications and Patient Condition  Indications for airway management: airway protection    Preoxygenated: yes  MILS maintained throughout  Mask difficulty assessment: 1 - vent by mask    Final Airway Details  Final airway type: endotracheal airway      Successful airway: ETT  Cuffed: yes   Successful intubation technique: direct laryngoscopy  Endotracheal tube insertion site: oral  Blade: Alexis  Blade size: 3  ETT size (mm): 7.0  Cormack-Lehane Classification: grade I - full view of glottis  Placement verified by: chest auscultation and capnometry   Measured from: lips  ETT/EBT  to lips (cm): 20  Number of attempts at approach: 1  Assessment: lips, teeth, and gum same as pre-op and atraumatic intubation

## 2023-01-10 NOTE — H&P
[facility]   PREOPERATIVE HISTORY AND PHYSICAL    Patient Name:Camilla Marcos  : 1945  MRN: [id number]  Primary Care Physician: Homero Ro MD  Date of admission: 1/10/2023    Subjective   Subjective     Chief Complaint: preoperative evaluation    History of Present Illness  Camilla Marcos is a 77 y.o. female who presents for preoperative evaluation for a possible esophagogastric anastomotic leak. She is scheduled for ESOPHAGOGASTRODUODENOSCOPY WITH STENT PLACEMENT (N/A).  She is feeling well.  No complaints.    Review of Systems   Constitutional: Negative.    HENT: Negative.    Eyes: Negative.    Respiratory: Negative.    Cardiovascular: Negative.    Gastrointestinal: Negative.    Endocrine: Negative.    Genitourinary: Negative.    Musculoskeletal: Negative.    Skin: Negative.    Allergic/Immunologic: Negative.    Neurological: Negative.    Hematological: Negative.    Psychiatric/Behavioral: Negative.         Personal History     Past Medical History:   Diagnosis Date   • A-fib ([facility])     dr. Givens - [place]:  ELIQUIS   • Anxiety    • Arthritis    • COVID-19 virus infection 2021   • Delayed emergence from anesthesia     history   • Depression     situational  gets upset when in hospital    • Diabetes ([facility])     diet controlled  has weight loss   • Difficulty swallowing solids     WITH MEATS   • Dysphagia    • Esophageal cancer ([facility])    • GERD (gastroesophageal reflux disease)    • History of mononucleosis    • History of transfusion     no reaction   • HX: breast cancer     right   • Hypertension    • Osteoporosis    • Thyroid disorder     hypothyroid       Past Surgical History:   Procedure Laterality Date   • BREAST BIOPSY Right    • BREAST BIOPSY Left    • BREAST LUMPECTOMY Right    • COLONOSCOPY     • CYSTOSCOPY URETEROSCOPY Left 2022    Procedure: CYSTOSCOPY, LEFT URETEROSCOPY, LASERTRIPSY, STENT INSERTION;  Surgeon: Jason Grajeda MD;  Location: [facility] OR;   Service: Urology;  Laterality: Left;   • ENDOSCOPY  02/06/2019   • ENDOSCOPY  08/06/2018   • ENDOSCOPY N/A 09/16/2019    Procedure: ESOPHAGOGASTRODUODENOSCOPY with DILATATION (12-15mm balloon);  Surgeon: Aldair Booker MD;  Location: [facility] ENDOSCOPY;  Service: Gastroenterology   • ENDOSCOPY N/A 12/16/2019    Procedure: ESOPHAGOGASTRODUODENOSCOPY with balloon dilitation 15-18mm) up to 16mm;  Surgeon: Aldair Booker MD;  Location: [facility] ENDOSCOPY;  Service: Gastroenterology   • ENDOSCOPY N/A 07/24/2020    Procedure: ESOPHAGOGASTRODUODENOSCOPY with balloon dilation(12mm-15mm up to 14.5mm) of esophageal anastomosis stricture;  Surgeon: Aldair Booker MD;  Location: [facility] ENDOSCOPY;  Service: Gastroenterology;  Laterality: N/A;  esophageal stricture   • ENDOSCOPY N/A 05/13/2021    Procedure: ESOPHAGOGASTRODUODENOSCOPY WITH BALLOON DILATION UP TO 19MM;  Surgeon: Lily Bettencourt MD;  Location: [facility] ENDOSCOPY;  Service: Gastroenterology;  Laterality: N/A;  ANASTAMOTIC STRICTURE   • ENDOSCOPY N/A 06/24/2021    Procedure: ESOPHAGOGASTRODUODENOSCOPY WITH  DILATATION WITH BALLOON (18-20MM, UP TO 20MM);  Surgeon: Lily Bettencourt MD;  Location: [facility] ENDOSCOPY;  Service: Gastroenterology;  Laterality: N/A;  ESOPHAGOGASTRO ANASTOMOTIC STRICTURE   • ENDOSCOPY N/A 07/22/2021    Procedure: ESOPHAGOGASTRODUodenoscopy;  Surgeon: Lily Bettencourt MD;  Location: [facility] ENDOSCOPY;  Service: Gastroenterology;  Laterality: N/A;  esophageal pleural fistula   • ENDOSCOPY N/A 07/23/2021    Procedure: ESOPHAGOGASTRODUODENOSCOPY with stent placement;  Surgeon: Lily Bettencourt MD;  Location: [facility] MAIN OR;  Service: Gastroenterology;  Laterality: N/A;   • ENDOSCOPY N/A 12/01/2021    Procedure: ESOPHAGOGASTRODUODENOSCOPY WITH STENT REMOVAL;  Surgeon: Lily Bettencourt MD;  Location: [facility] MAIN OR;  Service: Gastroenterology;  Laterality: N/A;   • ENDOSCOPY W/ STENT PLACEMENT/REMOVAL N/A 10/06/2021    Procedure:  ESOPHAGOGASTRODUODENOSCOPY WITH STENT removal and possible replacement;  Surgeon: Lily Bettencourt MD;  Location: [facility] MAIN OR;  Service: Gastroenterology;  Laterality: N/A;   • ESOPHAGOGASTRECTOMY  03/04/2019    Royce Sunil   • ESOPHAGUS SURGERY      had a stent placed for hole in esophagus   • HEAD/NECK ABSCESS INCISION AND DRAINAGE N/A 6/28/2022    Procedure: Excision of a superficial neck mass;  Surgeon: Lily Bettencourt MD;  Location: [facility] MAIN OR;  Service: Cardiothoracic;  Laterality: N/A;   • JEJUNOSTOMY N/A 07/26/2021    Procedure: OPEN JEJUNOSTOMY TUBE;  Surgeon: Bulmaro Coreas Jr., MD;  Location: [facility] MAIN OR;  Service: General;  Laterality: N/A;   • LAPAROSCOPIC TUBAL LIGATION     • THORACOTOMY Right 08/17/2018    right vats biopsy of mediastinal mass, right thoracotomy       Family History: Her family history includes Bone cancer in her mother; Diabetes in her daughter and maternal grandfather; Hypertension in her daughter and mother; Ulcerative colitis in her father.     Social History: She  reports that she quit smoking about 23 years ago. Her smoking use included cigars and cigarettes. She has a 5.00 pack-year smoking history. She has never used smokeless tobacco. She reports that she does not currently use alcohol. She reports that she does not use drugs.    Home Medications:  Chlorhexidine Gluconate, acetaminophen, albuterol sulfate HFA, apixaban, budesonide-formoterol, cetirizine, dilTIAZem CD, dilTIAZem HCl, diphenhydrAMINE, furosemide, ibuprofen, levothyroxine, metoprolol succinate XL, midodrine, and potassium chloride    Allergies:  She is allergic to amoxicillin, contrast dye (echo or unknown ct/mr), and iodine.    Objective    Objective     Vitals:    Temp:  [97.8 °F (36.6 °C)] 97.8 °F (36.6 °C)  Heart Rate:  [111] 111  Resp:  [20] 20  BP: (156)/(99) 156/99    Physical Exam  Vitals and nursing note reviewed.   Constitutional:       Appearance: She is well-developed.   HENT:      Head:  Normocephalic and atraumatic.      Nose: Nose normal.   Eyes:      Conjunctiva/sclera: Conjunctivae normal.   Cardiovascular:      Rate and Rhythm: Normal rate.   Pulmonary:      Effort: Pulmonary effort is normal.   Abdominal:      Palpations: Abdomen is soft.   Musculoskeletal:      Cervical back: Neck supple.   Skin:     General: Skin is warm and dry.   Neurological:      Mental Status: She is alert and oriented to person, place, and time.   Psychiatric:         Behavior: Behavior normal.         Thought Content: Thought content normal.         Judgment: Judgment normal.         Assessment & Plan   Assessment / Plan     Brief Patient Summary:  Camilla Marcos is a 77 y.o. female who presents for preoperative evaluation for a possible esophagogastric anastomotic leak after esophagectomy for GIST.  Her imaging studies were concerning for an issue at her anastomosis and therefore we will plan an EGD with possible stent placement.    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 No guarantees were made or implied. Informed consent was obtained.    Lily Bettencourt MD

## 2023-01-10 NOTE — ANESTHESIA PREPROCEDURE EVALUATION
Anesthesia Evaluation     Patient summary reviewed and Nursing notes reviewed                Airway   Mallampati: II  TM distance: >3 FB  Neck ROM: full  No difficulty expected  Dental      Pulmonary - negative pulmonary ROS   Cardiovascular     ECG reviewed  PT is on anticoagulation therapy  Patient on routine beta blocker and Beta blocker given within 24 hours of surgery  Rhythm: irregular  Rate: abnormal    (+) hypertension, dysrhythmias, hyperlipidemia,       Neuro/Psych- negative ROS  GI/Hepatic/Renal/Endo    (+)  GERD,  renal disease, diabetes mellitus, thyroid problem     Musculoskeletal (-) negative ROS    Abdominal    Substance History - negative use     OB/GYN negative ob/gyn ROS         Other                      Anesthesia Plan    ASA 3     general     (AFib/PAF/Sinus    GETA    I have reviewed the patient's history with the patient and the chart, including all pertinent laboratory results and imaging. I have explained the risks of anesthesia including but not limited to dental damage, corneal abrasion, nerve injury, MI, stroke, and death. Questions asked and answered. Anesthetic plan discussed with patient and team as indicated. Patient expressed understanding of the above.  )  intravenous induction     Anesthetic plan, risks, benefits, and alternatives have been provided, discussed and informed consent has been obtained with: patient.        CODE STATUS:

## 2023-01-10 NOTE — ANESTHESIA POSTPROCEDURE EVALUATION
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        Post Anesthesia Care and Evaluation    Patient location during evaluation: PACU  Patient participation: complete - patient participated  Level of consciousness: awake and alert  Pain management: adequate    Airway patency: patent  Anesthetic complications: No anesthetic complications    Cardiovascular status: acceptable  Respiratory status: acceptable  Hydration status: acceptable    Comments: --------------------            01/10/23               1510     --------------------   BP:       142/81     Pulse:      70       Resp:       18       Temp:                SpO2:      97%      --------------------

## 2023-01-10 NOTE — OP NOTE
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  There appears to be a significant portion of the stomach in an outpouching near the anastomosis which is likely the reason her imaging appears the way that it does.     Complications: None apparent      Description of Procedure: Ms. Marcos was identified in the preoperative holding area getting consent for the procedure was verified.  She was transported the operating placed on the operating table in supine position.  General anesthetic was successfully administered she was intubated without difficulty.  A timeout was performed.  The Olympus videoendoscope was introduced in the patient's esophagus and the esophagogastric anastomosis was encountered at approximately 25 cm from the lip.  The anastomosis was normal.  Just distal to the anastomosis there was an outpouching of stomach that appears to correlate with the imaging findings.  There is no evidence of leak into the chest and there appeared to be mucosal coating the entire surface of this outpouching.  I suspect this is an anatomic variant based on her prior surgical procedure.  I elected to not perform a stent at this time given its appearance.  The  remainder of the conduit was within normal limits and the duodenum was normal as well.  The scope was withdrawn.  The patient was awakened transferred to recovery room in stable condition.      Lily Bettencourt MD     Date: 1/10/2023  Time: 14:24 EST

## 2023-01-17 ENCOUNTER — HOSPITAL ENCOUNTER (OUTPATIENT)
Dept: BONE DENSITY | Facility: HOSPITAL | Age: 78
Discharge: HOME | End: 2023-01-17
Payer: MEDICARE

## 2023-01-17 ENCOUNTER — HOSPITAL ENCOUNTER (OUTPATIENT)
Dept: MAMMOGRAPHY | Facility: HOSPITAL | Age: 78
Discharge: HOME | End: 2023-01-17
Payer: MEDICARE

## 2023-01-17 DIAGNOSIS — Z12.31 SCREENING MAMMOGRAM FOR BREAST CANCER: ICD-10-CM

## 2023-01-17 DIAGNOSIS — N95.9 MENOPAUSAL DISORDER: ICD-10-CM

## 2023-01-17 PROCEDURE — 77063 BREAST TOMOSYNTHESIS BI: CPT

## 2023-01-17 PROCEDURE — 77080 DXA BONE DENSITY AXIAL: CPT

## 2023-01-17 PROCEDURE — 77067 SCR MAMMO BI INCL CAD: CPT

## 2023-02-14 ENCOUNTER — OFFICE VISIT (OUTPATIENT)
Dept: OTHER | Facility: HOSPITAL | Age: 78
End: 2023-02-14
Payer: MEDICARE

## 2023-02-14 VITALS
WEIGHT: 132 LBS | HEIGHT: 67 IN | OXYGEN SATURATION: 97 % | DIASTOLIC BLOOD PRESSURE: 78 MMHG | HEART RATE: 85 BPM | BODY MASS INDEX: 20.72 KG/M2 | SYSTOLIC BLOOD PRESSURE: 122 MMHG

## 2023-02-14 DIAGNOSIS — C49.A9 MALIGNANT GASTROINTESTINAL STROMAL TUMOR (GIST) OF OTHER SITE: Primary | ICD-10-CM

## 2023-02-14 PROCEDURE — 99213 OFFICE O/P EST LOW 20 MIN: CPT | Performed by: THORACIC SURGERY (CARDIOTHORACIC VASCULAR SURGERY)

## 2023-02-14 PROCEDURE — G0463 HOSPITAL OUTPT CLINIC VISIT: HCPCS | Performed by: THORACIC SURGERY (CARDIOTHORACIC VASCULAR SURGERY)

## 2023-06-15 ENCOUNTER — HOSPITAL ENCOUNTER (OUTPATIENT)
Dept: CT IMAGING | Facility: HOSPITAL | Age: 78
Discharge: HOME | End: 2023-06-15
Admitting: THORACIC SURGERY (CARDIOTHORACIC VASCULAR SURGERY)
Payer: MEDICARE

## 2023-06-15 DIAGNOSIS — C49.A9 MALIGNANT GASTROINTESTINAL STROMAL TUMOR (GIST) OF OTHER SITE: ICD-10-CM

## 2023-06-15 PROCEDURE — 71250 CT THORAX DX C-: CPT

## 2023-06-15 PROCEDURE — 74176 CT ABD & PELVIS W/O CONTRAST: CPT

## 2023-07-18 ENCOUNTER — OFFICE VISIT (OUTPATIENT)
Dept: OTHER | Facility: HOSPITAL | Age: 78
End: 2023-07-18
Payer: MEDICARE

## 2023-07-18 VITALS
HEART RATE: 78 BPM | OXYGEN SATURATION: 94 % | DIASTOLIC BLOOD PRESSURE: 82 MMHG | SYSTOLIC BLOOD PRESSURE: 138 MMHG | HEIGHT: 67 IN | WEIGHT: 130 LBS | BODY MASS INDEX: 20.4 KG/M2

## 2023-07-18 DIAGNOSIS — C49.A9 MALIGNANT GASTROINTESTINAL STROMAL TUMOR (GIST) OF OTHER SITE: Primary | ICD-10-CM

## 2023-07-18 PROCEDURE — 1159F MED LIST DOCD IN RCRD: CPT | Performed by: THORACIC SURGERY (CARDIOTHORACIC VASCULAR SURGERY)

## 2023-07-18 PROCEDURE — 99213 OFFICE O/P EST LOW 20 MIN: CPT | Performed by: THORACIC SURGERY (CARDIOTHORACIC VASCULAR SURGERY)

## 2023-07-18 PROCEDURE — 3079F DIAST BP 80-89 MM HG: CPT | Performed by: THORACIC SURGERY (CARDIOTHORACIC VASCULAR SURGERY)

## 2023-07-18 PROCEDURE — 3075F SYST BP GE 130 - 139MM HG: CPT | Performed by: THORACIC SURGERY (CARDIOTHORACIC VASCULAR SURGERY)

## 2023-07-18 PROCEDURE — G0463 HOSPITAL OUTPT CLINIC VISIT: HCPCS | Performed by: THORACIC SURGERY (CARDIOTHORACIC VASCULAR SURGERY)

## 2023-07-18 PROCEDURE — 1160F RVW MEDS BY RX/DR IN RCRD: CPT | Performed by: THORACIC SURGERY (CARDIOTHORACIC VASCULAR SURGERY)

## 2023-11-22 ENCOUNTER — TRANSCRIBE ORDERS (OUTPATIENT)
Dept: ADMINISTRATIVE | Facility: HOSPITAL | Age: 78
End: 2023-11-22
Payer: MEDICARE

## 2023-11-22 DIAGNOSIS — Z12.31 ENCOUNTER FOR SCREENING MAMMOGRAM FOR MALIGNANT NEOPLASM OF BREAST: Primary | ICD-10-CM

## 2024-01-09 ENCOUNTER — HOSPITAL ENCOUNTER (OUTPATIENT)
Dept: CT IMAGING | Facility: HOSPITAL | Age: 79
Discharge: HOME | End: 2024-01-09
Admitting: THORACIC SURGERY (CARDIOTHORACIC VASCULAR SURGERY)
Payer: MEDICARE

## 2024-01-09 DIAGNOSIS — C49.A9 MALIGNANT GASTROINTESTINAL STROMAL TUMOR (GIST) OF OTHER SITE: ICD-10-CM

## 2024-01-09 PROCEDURE — 71250 CT THORAX DX C-: CPT

## 2024-01-16 ENCOUNTER — TELEPHONE (OUTPATIENT)
Dept: SURGERY | Facility: CLINIC | Age: 79
End: 2024-01-16
Payer: MEDICARE

## 2024-01-16 ENCOUNTER — OFFICE VISIT (OUTPATIENT)
Dept: OTHER | Facility: HOSPITAL | Age: 79
End: 2024-01-16
Payer: MEDICARE

## 2024-01-16 VITALS
HEIGHT: 67 IN | SYSTOLIC BLOOD PRESSURE: 132 MMHG | OXYGEN SATURATION: 97 % | DIASTOLIC BLOOD PRESSURE: 72 MMHG | BODY MASS INDEX: 20.36 KG/M2 | HEART RATE: 97 BPM

## 2024-01-16 DIAGNOSIS — C49.A9 MALIGNANT GASTROINTESTINAL STROMAL TUMOR (GIST) OF OTHER SITE: Primary | ICD-10-CM

## 2024-01-16 PROCEDURE — 1160F RVW MEDS BY RX/DR IN RCRD: CPT | Performed by: THORACIC SURGERY (CARDIOTHORACIC VASCULAR SURGERY)

## 2024-01-16 PROCEDURE — 3078F DIAST BP <80 MM HG: CPT | Performed by: THORACIC SURGERY (CARDIOTHORACIC VASCULAR SURGERY)

## 2024-01-16 PROCEDURE — 1159F MED LIST DOCD IN RCRD: CPT | Performed by: THORACIC SURGERY (CARDIOTHORACIC VASCULAR SURGERY)

## 2024-01-16 PROCEDURE — 3075F SYST BP GE 130 - 139MM HG: CPT | Performed by: THORACIC SURGERY (CARDIOTHORACIC VASCULAR SURGERY)

## 2024-01-16 RX ORDER — POTASSIUM CHLORIDE 20 MEQ/1
TABLET, EXTENDED RELEASE ORAL
COMMUNITY
Start: 2023-11-27

## 2024-01-16 RX ORDER — ERGOCALCIFEROL 1.25 MG/1
CAPSULE ORAL
COMMUNITY
Start: 2023-11-22

## 2024-01-16 NOTE — TELEPHONE ENCOUNTER
Received a call from the [facility] that patient was not notified that her appointment was canceled and patient is unable to make the appointment on 1/30/24. I informed the [facility] that if the patient is willing to wait til Dr. Bettencourt arrives around 10. [facility] relayed information to the patient.     MEKA

## 2024-01-16 NOTE — LETTER
February 1, 2024       No Recipients    Patient: Camilla Marcos   YOB: 1945   Date of Visit: 1/16/2024     Dear Homero Ro MD:       Thank you for referring Camilla Marcos to me for evaluation. Below are the relevant portions of my assessment and plan of care.    If you have questions, please do not hesitate to call me. I look forward to following Camilla along with you.         Sincerely,        Lily Bettencourt MD        CC:   No Recipients    Lily Bettencourt MD  02/01/24 1322  Sign when Signing Visit  Chief Complaint  GIST    Subjective       Camilla Marcos presents to [facility]  History of Present Illness    Ms. Marcos is a pleasant 78-year-old lady status post esophagectomy for GIST tumor. She presents in follow-up.    The patient reports she is doing well. She states she has intermittent pain in the broken place in her back that will usually resolve on its own. However, she notes she is still experiencing the pain. She reports it is her fault since she is not sitting on the couch right. She states she is experiencing a nerve type pain in her shoulder, and her toes. She adds this pain is a different pain from her intermittent pain in her back. She reports she was taking ibuprofen, and another medication; however, she states she was informed she is not supposed to take them. She notes that currently she is taking Tylenol extra strength once a day. She reports she is uncertain on how often she can utilize Tylenol. She reports her eating is going about the same, and it has not improved any. She adds she still has a problem eating certain types of meats. She reports her appetite is not particularly good some days; however, she notes not regularly. She notes she eats whatever she wants throughout the day. She reports she thinks her weight may have gone down slightly, and she has not weighed her this morning. She adds she is not gaining any weight.  "    She reports she has undergone cataract surgery on her right eye on 11/30/2023. She notes she believes the all the procedures she has completed have affected her a little bit.     She has a mammogram scheduled, and has a dentist appointment tomorrow, 01/17/2024.    Objective  Vital Signs:  /72 (BP Location: Left arm, Patient Position: Sitting, Cuff Size: Adult)   Pulse 97   Ht 170.2 cm (67\")   SpO2 97%   BMI 20.36 kg/m²   Estimated body mass index is 20.36 kg/m² as calculated from the following:    Height as of this encounter: 170.2 cm (67\").    Weight as of 7/18/23: 59 kg (130 lb).       BMI is within normal parameters. No other follow-up for BMI required.      Physical Exam  Vitals and nursing note reviewed.   Constitutional:       Appearance: She is well-developed.   HENT:      Head: Normocephalic and atraumatic.      Nose: Nose normal.   Eyes:      Conjunctiva/sclera: Conjunctivae normal.   Cardiovascular:      Rate and Rhythm: Normal rate.   Pulmonary:      Effort: Pulmonary effort is normal.   Abdominal:      Palpations: Abdomen is soft.   Musculoskeletal:      Cervical back: Neck supple.   Skin:     General: Skin is warm and dry.   Neurological:      Mental Status: She is alert and oriented to person, place, and time.   Psychiatric:         Behavior: Behavior normal.         Thought Content: Thought content normal.         Judgment: Judgment normal.        Result Review:    Result Review    I have independently reviewed the CT of the chest performed on 01/09/2024 which demonstrates scarring consistent with prior thoracotomy, esophagectomy, and gastric pull through. Stable collection of gas laterally in the pleural space that is unchanged. Stable thyroid mass. Stable adrenal adenoma. No evidence of recurrent disease.                     Assessment and Plan     Diagnoses and all orders for this visit:    1. 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 Marcos is a pleasant 78-year-old lady with a GIST tumor status post esophagectomy. She will need continued surveillance for her GIST tumor. We will plan to see her back in 1 year with a CT of the chest, abdomen, and pelvis. She will follow up with her oncologist, Dr. Ro. 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 She is now 5 years out without evidence of issue.       I spent 20 minutes caring for Camilla on this date of service. This time includes time spent by me in the following activities:preparing for the visit, reviewing tests, obtaining and/or reviewing a separately obtained history, performing a medically appropriate examination and/or evaluation , counseling and educating the patient/family/caregiver, ordering medications, tests, or procedures, documenting information in the medical record, and independently interpreting results and communicating that information with the patient/family/caregiver  Follow Up     No follow-ups on file.  Patient was given instructions and counseling regarding her condition or for health maintenance advice. Please see specific information pulled into the AVS if appropriate.           Transcribed from ambient dictation for Lily Bettencourt MD by Rosaura Weinstein.  01/16/24   11:34 EST    Patient or patient representative verbalized consent to the visit recording.  I have personally performed the services described in this document as transcribed by the above individual, and it is both accurate and complete.

## 2024-01-16 NOTE — PROGRESS NOTES
"Chief Complaint  GIST    Subjective        Camilla Marcos presents to [facility]  History of Present Illness    Ms. Marcos is a pleasant 78-year-old lady status post esophagectomy for GIST tumor. She presents in follow-up.    The patient reports she is doing well. She states she has intermittent pain in the broken place in her back that will usually resolve on its own. However, she notes she is still experiencing the pain. She reports it is her fault since she is not sitting on the couch right. She states she is experiencing a nerve type pain in her shoulder, and her toes. She adds this pain is a different pain from her intermittent pain in her back. She reports she was taking ibuprofen, and another medication; however, she states she was informed she is not supposed to take them. She notes that currently she is taking Tylenol extra strength once a day. She reports she is uncertain on how often she can utilize Tylenol. She reports her eating is going about the same, and it has not improved any. She adds she still has a problem eating certain types of meats. She reports her appetite is not particularly good some days; however, she notes not regularly. She notes she eats whatever she wants throughout the day. She reports she thinks her weight may have gone down slightly, and she has not weighed her this morning. She adds she is not gaining any weight.     She reports she has undergone cataract surgery on her right eye on 11/30/2023. She notes she believes the all the procedures she has completed have affected her a little bit.     She has a mammogram scheduled, and has a dentist appointment tomorrow, 01/17/2024.    Objective   Vital Signs:  /72 (BP Location: Left arm, Patient Position: Sitting, Cuff Size: Adult)   Pulse 97   Ht 170.2 cm (67\")   SpO2 97%   BMI 20.36 kg/m²   Estimated body mass index is 20.36 kg/m² as calculated from the following:    Height as of " "this encounter: 170.2 cm (67\").    Weight as of 7/18/23: 59 kg (130 lb).       BMI is within normal parameters. No other follow-up for BMI required.      Physical Exam  Vitals and nursing note reviewed.   Constitutional:       Appearance: She is well-developed.   HENT:      Head: Normocephalic and atraumatic.      Nose: Nose normal.   Eyes:      Conjunctiva/sclera: Conjunctivae normal.   Cardiovascular:      Rate and Rhythm: Normal rate.   Pulmonary:      Effort: Pulmonary effort is normal.   Abdominal:      Palpations: Abdomen is soft.   Musculoskeletal:      Cervical back: Neck supple.   Skin:     General: Skin is warm and dry.   Neurological:      Mental Status: She is alert and oriented to person, place, and time.   Psychiatric:         Behavior: Behavior normal.         Thought Content: Thought content normal.         Judgment: Judgment normal.        Result Review :    Result Review     I have independently reviewed the CT of the chest performed on 01/09/2024 which demonstrates scarring consistent with prior thoracotomy, esophagectomy, and gastric pull through. Stable collection of gas laterally in the pleural space that is unchanged. Stable thyroid mass. Stable adrenal adenoma. No evidence of recurrent disease.                     Assessment and Plan     Diagnoses and all orders for this visit:    1. 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 Marcos is a pleasant 78-year-old lady with a GIST tumor status post esophagectomy. She will need continued surveillance for her GIST tumor. We will plan to see her back in 1 year with a CT of the chest, abdomen, and pelvis. She will follow up with her oncologist, Dr. Ro. 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 She is now 5 years out without evidence of issue.       I spent 20 minutes caring for " Camilla on this date of service. This time includes time spent by me in the following activities:preparing for the visit, reviewing tests, obtaining and/or reviewing a separately obtained history, performing a medically appropriate examination and/or evaluation , counseling and educating the patient/family/caregiver, ordering medications, tests, or procedures, documenting information in the medical record, and independently interpreting results and communicating that information with the patient/family/caregiver  Follow Up     No follow-ups on file.  Patient was given instructions and counseling regarding her condition or for health maintenance advice. Please see specific information pulled into the AVS if appropriate.           Transcribed from ambient dictation for Lily Bettencourt MD by Rosaura Weinstein.  01/16/24   11:34 EST    Patient or patient representative verbalized consent to the visit recording.  I have personally performed the services described in this document as transcribed by the above individual, and it is both accurate and complete.

## 2024-02-21 ENCOUNTER — HOSPITAL ENCOUNTER (OUTPATIENT)
Dept: MAMMOGRAPHY | Facility: HOSPITAL | Age: 79
Discharge: HOME | End: 2024-02-21
Admitting: INTERNAL MEDICINE
Payer: MEDICARE

## 2024-02-21 DIAGNOSIS — Z12.31 ENCOUNTER FOR SCREENING MAMMOGRAM FOR MALIGNANT NEOPLASM OF BREAST: ICD-10-CM

## 2024-02-21 PROCEDURE — 77063 BREAST TOMOSYNTHESIS BI: CPT

## 2024-02-21 PROCEDURE — 77067 SCR MAMMO BI INCL CAD: CPT

## (undated) DEVICE — DEV INFL CRE STERIFLATE 60CC DISP

## (undated) DEVICE — BITEBLOCK ENDO W/STRAP 60F A/ LF DISP

## (undated) DEVICE — TUBING, SUCTION, 1/4" X 12', STRAIGHT: Brand: MEDLINE

## (undated) DEVICE — ANTIBACTERIAL UNDYED BRAIDED (POLYGLACTIN 910), SYNTHETIC ABSORBABLE SUTURE: Brand: COATED VICRYL

## (undated) DEVICE — PK ENDO GI 50

## (undated) DEVICE — GASTROINTESTINAL ANCHOR SET, SAF-T-PEXY* T-FASTENERS: Brand: GASTROINTESTINAL ANCHOR SET, SAF-T-PEXY* T-FASTENERS

## (undated) DEVICE — 3M™ STERI-STRIP™ REINFORCED ADHESIVE SKIN CLOSURES, R1547, 1/2 IN X 4 IN (12 MM X 100 MM), 6 STRIPS/ENVELOPE: Brand: 3M™ STERI-STRIP™

## (undated) DEVICE — Device: Brand: DEFENDO AIR/WATER/SUCTION AND BIOPSY VALVE

## (undated) DEVICE — PAPR PRNT PK SONY W RIBN UPC55

## (undated) DEVICE — LOU MINOR PROCEDURE: Brand: MEDLINE INDUSTRIES, INC.

## (undated) DEVICE — SUT SILK 2/0 SH CR8 18IN CR8 C012D

## (undated) DEVICE — SUT SILK 0 PSL 18IN 580H

## (undated) DEVICE — TUBING, SUCTION, 1/4" X 10', STRAIGHT: Brand: MEDLINE

## (undated) DEVICE — ADAPT CLN BIOGUARD AIR/H2O DISP

## (undated) DEVICE — SOL IRR NACL 0.9PCT 3000ML

## (undated) DEVICE — ELECTRD BLD EZ CLN MOD XLNG 2.75IN

## (undated) DEVICE — Device

## (undated) DEVICE — SENSR O2 OXIMAX FNGR A/ 18IN NONSTR

## (undated) DEVICE — GLV SURG BIOGEL LTX PF 6

## (undated) DEVICE — CANN O2 ETCO2 FITS ALL CONN CO2 SMPL A/ 7IN DISP LF

## (undated) DEVICE — LN SMPL CO2 SHTRM SD STREAM W/M LUER

## (undated) DEVICE — SUT ETHLN 3/0 PS1 18IN 1663H

## (undated) DEVICE — NDL PERC 1PRT THNWALL W/BASEPLT 18G 7CM

## (undated) DEVICE — THE DISPOSABLE RAPTOR GRASPING DEVICE IS USED TO GRASP TISSUE AND/OR RETRIEVE FOREIGN BODIES, EXCISED TISSUE AND STENTS DURING ENDOSCOPIC PROCEDURES.: Brand: RAPTOR

## (undated) DEVICE — TUBING, SUCTION, 1/4" X 20', STRAIGHT: Brand: MEDLINE INDUSTRIES, INC.

## (undated) DEVICE — NITINOL STONE RETRIEVAL BASKET: Brand: ESCAPE

## (undated) DEVICE — BNDR ABD 4PANEL 12IN MED/LG

## (undated) DEVICE — NDL HYPO PRECISIONGLIDE REG 25G 1 1/2

## (undated) DEVICE — TB FEED JEJUNAL 18F

## (undated) DEVICE — DEV INFL BALN BIG60 W/GAUGE 60ML

## (undated) DEVICE — KT ORCA ORCAPOD DISP STRL

## (undated) DEVICE — DRSNG WND VAC GRANUFOAM SENSATRAC LG

## (undated) DEVICE — ESOPHAGEAL BALLOON DILATATION CATHETER: Brand: CRE FIXED WIRE

## (undated) DEVICE — DILATOR CONTRL RADL 12/15MM 8CM BX5

## (undated) DEVICE — PK PROC MAJ 40

## (undated) DEVICE — GW SUREGLIDE FLXTIP ANG/TP .038 3X150CM EA/5

## (undated) DEVICE — ADAPTER,CATHETER/SYRINGE/LUER,STERILE: Brand: MEDLINE

## (undated) DEVICE — SHEATH ACC URETRL UROPASS 12/14F 24CM EA/5

## (undated) DEVICE — 3M™ STERI-STRIP™ COMPOUND BENZOIN TINCTURE 40 BAGS/CARTON 4 CARTONS/CASE C1544: Brand: 3M™ STERI-STRIP™

## (undated) DEVICE — ENDOSCOPIC VALVE WITH ADAPTER.: Brand: SURSEAL® II

## (undated) DEVICE — SUT SILK 4/0 TIES 18IN A183H

## (undated) DEVICE — DUAL LUMEN URETERAL CATHETER

## (undated) DEVICE — IRRIGATOR BULB ASEPTO 60CC STRL

## (undated) DEVICE — SNAR POLYP SENSATION STDOVL 27 240 BX40

## (undated) DEVICE — BITEBLOCK OMNI BLOC

## (undated) DEVICE — BASIC SINGLE BASIN-LF: Brand: MEDLINE INDUSTRIES, INC.

## (undated) DEVICE — TRAP FLD MINIVAC MEGADYNE 100ML

## (undated) DEVICE — DUAL LUMEN STOMACH TUBE,ANTI-REFLUX VALVE: Brand: SALEM SUMP

## (undated) DEVICE — SUT PROLN 1 CT1 30IN 8425H

## (undated) DEVICE — SYRINGE,TOOMEY,IRRIGATION,70CC,STERILE: Brand: MEDLINE

## (undated) DEVICE — STPLR SKIN VISISTAT WD 35CT

## (undated) DEVICE — GW DREAMWIRE STR SS .035IN 260CM

## (undated) DEVICE — APPL CHLORAPREP HI/LITE 26ML ORNG

## (undated) DEVICE — Y-TYPE TUR/BLADDER IRRIGATION SET, REGULATING CLAMP

## (undated) DEVICE — PENCL ES MEGADINE EZ/CLEAN BUTN W/HOLSTR 10FT

## (undated) DEVICE — GLV SURG SENSICARE SLT PF LF 8 STRL

## (undated) DEVICE — TRY PREP SCRB VAG PVP

## (undated) DEVICE — PK PROC TURNOVER

## (undated) DEVICE — BALN DIL ESOPH CRE CONTRL RADL 15/18MM 8CM BX5

## (undated) DEVICE — KT CANSTR VAC WND W/ISOLYSER SENSATRAC 500CC 5CS

## (undated) DEVICE — SUT MNCRYL PLS ANTIB UD 4/0 PS2 18IN

## (undated) DEVICE — PENCL E/S ULTRAVAC TELESCP NOSE HOLSTR 10FT

## (undated) DEVICE — INTENDED FOR TISSUE SEPARATION, AND OTHER PROCEDURES THAT REQUIRE A SHARP SURGICAL BLADE TO PUNCTURE OR CUT.: Brand: BARD-PARKER ® CARBON RIB-BACK BLADES

## (undated) DEVICE — TOWEL,OR,DSP,ST,BLUE,STD,4/PK,20PK/CS: Brand: MEDLINE

## (undated) DEVICE — FIBR LASR HOLMIUM COMPAT 272MH DISP

## (undated) DEVICE — SPNG GZ WOVN 4X4IN 12PLY 10/BX STRL

## (undated) DEVICE — GLV SURG BIOGEL LTX PF 8 1/2

## (undated) DEVICE — GW PTFE/COAT STR/TP STFF/BDY .038 150CM STRL EA/5

## (undated) DEVICE — CYSTO PACK: Brand: MEDLINE INDUSTRIES, INC.

## (undated) DEVICE — SYS IRR PUMP SGL ACTN VAC SYR 10CC